# Patient Record
Sex: MALE | Race: WHITE | Employment: OTHER | URBAN - METROPOLITAN AREA
[De-identification: names, ages, dates, MRNs, and addresses within clinical notes are randomized per-mention and may not be internally consistent; named-entity substitution may affect disease eponyms.]

---

## 2017-01-24 ENCOUNTER — OFFICE VISIT (OUTPATIENT)
Dept: CARDIOLOGY CLINIC | Age: 72
End: 2017-01-24

## 2017-01-24 VITALS
HEART RATE: 99 BPM | BODY MASS INDEX: 26.66 KG/M2 | WEIGHT: 160 LBS | SYSTOLIC BLOOD PRESSURE: 134 MMHG | HEIGHT: 65 IN | OXYGEN SATURATION: 97 % | DIASTOLIC BLOOD PRESSURE: 70 MMHG

## 2017-01-24 DIAGNOSIS — I25.118 CORONARY ARTERY DISEASE OF NATIVE ARTERY OF NATIVE HEART WITH STABLE ANGINA PECTORIS (HCC): Primary | ICD-10-CM

## 2017-01-24 DIAGNOSIS — I10 ESSENTIAL HYPERTENSION: ICD-10-CM

## 2017-01-24 PROCEDURE — 4040F PNEUMOC VAC/ADMIN/RCVD: CPT | Performed by: INTERNAL MEDICINE

## 2017-01-24 PROCEDURE — 1036F TOBACCO NON-USER: CPT | Performed by: INTERNAL MEDICINE

## 2017-01-24 PROCEDURE — G8420 CALC BMI NORM PARAMETERS: HCPCS | Performed by: INTERNAL MEDICINE

## 2017-01-24 PROCEDURE — G8484 FLU IMMUNIZE NO ADMIN: HCPCS | Performed by: INTERNAL MEDICINE

## 2017-01-24 PROCEDURE — 1123F ACP DISCUSS/DSCN MKR DOCD: CPT | Performed by: INTERNAL MEDICINE

## 2017-01-24 PROCEDURE — G8598 ASA/ANTIPLAT THER USED: HCPCS | Performed by: INTERNAL MEDICINE

## 2017-01-24 PROCEDURE — G8427 DOCREV CUR MEDS BY ELIG CLIN: HCPCS | Performed by: INTERNAL MEDICINE

## 2017-01-24 PROCEDURE — 99214 OFFICE O/P EST MOD 30 MIN: CPT | Performed by: INTERNAL MEDICINE

## 2017-01-24 PROCEDURE — 3017F COLORECTAL CA SCREEN DOC REV: CPT | Performed by: INTERNAL MEDICINE

## 2017-01-24 RX ORDER — ATORVASTATIN CALCIUM 40 MG/1
40 TABLET, FILM COATED ORAL NIGHTLY
Qty: 30 TABLET | Refills: 11 | Status: SHIPPED | OUTPATIENT
Start: 2017-01-24 | End: 2017-07-21 | Stop reason: SDUPTHER

## 2017-01-24 RX ORDER — LISINOPRIL 2.5 MG/1
2.5 TABLET ORAL DAILY
Qty: 30 TABLET | Refills: 11 | Status: SHIPPED | OUTPATIENT
Start: 2017-01-24 | End: 2017-07-21 | Stop reason: SDUPTHER

## 2017-01-24 RX ORDER — DIGOXIN 125 MCG
125 TABLET ORAL DAILY
Qty: 30 TABLET | Refills: 11 | Status: SHIPPED | OUTPATIENT
Start: 2017-01-24 | End: 2017-07-21 | Stop reason: CLARIF

## 2017-02-16 ENCOUNTER — OFFICE VISIT (OUTPATIENT)
Dept: INTERNAL MEDICINE CLINIC | Age: 72
End: 2017-02-16

## 2017-02-16 VITALS
SYSTOLIC BLOOD PRESSURE: 150 MMHG | DIASTOLIC BLOOD PRESSURE: 75 MMHG | BODY MASS INDEX: 26.66 KG/M2 | HEIGHT: 65 IN | HEART RATE: 65 BPM | RESPIRATION RATE: 18 BRPM | WEIGHT: 160 LBS

## 2017-02-16 DIAGNOSIS — R13.14 PHARYNGOESOPHAGEAL DYSPHAGIA: ICD-10-CM

## 2017-02-16 DIAGNOSIS — I50.42 HEART FAILURE, SYSTOLIC AND DIASTOLIC, CHRONIC (HCC): ICD-10-CM

## 2017-02-16 DIAGNOSIS — I25.118 CORONARY ARTERY DISEASE OF NATIVE ARTERY OF NATIVE HEART WITH STABLE ANGINA PECTORIS (HCC): ICD-10-CM

## 2017-02-16 DIAGNOSIS — Z95.1 S/P CABG X 3: ICD-10-CM

## 2017-02-16 DIAGNOSIS — E11.9 WELL CONTROLLED TYPE 2 DIABETES MELLITUS (HCC): ICD-10-CM

## 2017-02-16 DIAGNOSIS — I10 ESSENTIAL HYPERTENSION: Primary | ICD-10-CM

## 2017-02-16 DIAGNOSIS — I25.5 ISCHEMIC CARDIOMYOPATHY: ICD-10-CM

## 2017-02-16 PROCEDURE — G8420 CALC BMI NORM PARAMETERS: HCPCS | Performed by: INTERNAL MEDICINE

## 2017-02-16 PROCEDURE — 1036F TOBACCO NON-USER: CPT | Performed by: INTERNAL MEDICINE

## 2017-02-16 PROCEDURE — G8427 DOCREV CUR MEDS BY ELIG CLIN: HCPCS | Performed by: INTERNAL MEDICINE

## 2017-02-16 PROCEDURE — 4040F PNEUMOC VAC/ADMIN/RCVD: CPT | Performed by: INTERNAL MEDICINE

## 2017-02-16 PROCEDURE — 3045F PR MOST RECENT HEMOGLOBIN A1C LEVEL 7.0-9.0%: CPT | Performed by: INTERNAL MEDICINE

## 2017-02-16 PROCEDURE — G8598 ASA/ANTIPLAT THER USED: HCPCS | Performed by: INTERNAL MEDICINE

## 2017-02-16 PROCEDURE — 99214 OFFICE O/P EST MOD 30 MIN: CPT | Performed by: INTERNAL MEDICINE

## 2017-02-16 PROCEDURE — 3017F COLORECTAL CA SCREEN DOC REV: CPT | Performed by: INTERNAL MEDICINE

## 2017-02-16 PROCEDURE — G8484 FLU IMMUNIZE NO ADMIN: HCPCS | Performed by: INTERNAL MEDICINE

## 2017-02-16 PROCEDURE — 1123F ACP DISCUSS/DSCN MKR DOCD: CPT | Performed by: INTERNAL MEDICINE

## 2017-02-16 ASSESSMENT — ENCOUNTER SYMPTOMS
RHINORRHEA: 0
CHEST TIGHTNESS: 0
SHORTNESS OF BREATH: 0
COLOR CHANGE: 0

## 2017-02-21 ENCOUNTER — TELEPHONE (OUTPATIENT)
Dept: INTERNAL MEDICINE CLINIC | Age: 72
End: 2017-02-21

## 2017-02-21 ENCOUNTER — HOSPITAL ENCOUNTER (OUTPATIENT)
Dept: GENERAL RADIOLOGY | Age: 72
Discharge: HOME | End: 2017-02-21
Attending: INTERNAL MEDICINE | Admitting: INTERNAL MEDICINE

## 2017-02-21 DIAGNOSIS — R13.14 DYSPHAGIA, PHARYNGOESOPHAGEAL PHASE: ICD-10-CM

## 2017-02-21 DIAGNOSIS — R13.14 PHARYNGOESOPHAGEAL DYSPHAGIA: ICD-10-CM

## 2017-02-21 LAB
A/G RATIO: 1.1 (ref 1.1–2.2)
ALBUMIN SERPL-MCNC: 4.1 G/DL (ref 3.4–5)
ALP BLD-CCNC: 87 U/L (ref 40–129)
ALT SERPL-CCNC: 11 U/L (ref 10–40)
ANION GAP SERPL CALCULATED.3IONS-SCNC: 18 MMOL/L (ref 3–16)
AST SERPL-CCNC: 14 U/L (ref 15–37)
BASOPHILS ABSOLUTE: 0.1 K/UL (ref 0–0.2)
BASOPHILS RELATIVE PERCENT: 0.7 %
BILIRUB SERPL-MCNC: 0.6 MG/DL (ref 0–1)
BUN BLDV-MCNC: 11 MG/DL (ref 7–20)
CALCIUM SERPL-MCNC: 9.5 MG/DL (ref 8.3–10.6)
CHLORIDE BLD-SCNC: 96 MMOL/L (ref 99–110)
CHOLESTEROL, TOTAL: 158 MG/DL (ref 0–199)
CO2: 21 MMOL/L (ref 21–32)
CREAT SERPL-MCNC: 1 MG/DL (ref 0.8–1.3)
EOSINOPHILS ABSOLUTE: 0.2 K/UL (ref 0–0.6)
EOSINOPHILS RELATIVE PERCENT: 2.8 %
GFR AFRICAN AMERICAN: >60
GFR NON-AFRICAN AMERICAN: >60
GLOBULIN: 3.7 G/DL
GLUCOSE BLD-MCNC: 316 MG/DL (ref 70–99)
HCT VFR BLD CALC: 42.6 % (ref 40.5–52.5)
HDLC SERPL-MCNC: 31 MG/DL (ref 40–60)
HEMOGLOBIN: 13.9 G/DL (ref 13.5–17.5)
LDL CHOLESTEROL CALCULATED: 103 MG/DL
LYMPHOCYTES ABSOLUTE: 1.5 K/UL (ref 1–5.1)
LYMPHOCYTES RELATIVE PERCENT: 18.5 %
MCH RBC QN AUTO: 27.6 PG (ref 26–34)
MCHC RBC AUTO-ENTMCNC: 32.7 G/DL (ref 31–36)
MCV RBC AUTO: 84.5 FL (ref 80–100)
MONOCYTES ABSOLUTE: 0.6 K/UL (ref 0–1.3)
MONOCYTES RELATIVE PERCENT: 7.9 %
NEUTROPHILS ABSOLUTE: 5.7 K/UL (ref 1.7–7.7)
NEUTROPHILS RELATIVE PERCENT: 70.1 %
PDW BLD-RTO: 15.3 % (ref 12.4–15.4)
PLATELET # BLD: 264 K/UL (ref 135–450)
PMV BLD AUTO: 9.1 FL (ref 5–10.5)
POTASSIUM SERPL-SCNC: 4.4 MMOL/L (ref 3.5–5.1)
RBC # BLD: 5.04 M/UL (ref 4.2–5.9)
SODIUM BLD-SCNC: 135 MMOL/L (ref 136–145)
TOTAL PROTEIN: 7.8 G/DL (ref 6.4–8.2)
TRIGL SERPL-MCNC: 120 MG/DL (ref 0–150)
VLDLC SERPL CALC-MCNC: 24 MG/DL
WBC # BLD: 8.2 K/UL (ref 4–11)

## 2017-02-22 LAB
ESTIMATED AVERAGE GLUCOSE: 263.3 MG/DL
HBA1C MFR BLD: 10.8 %

## 2017-02-24 ENCOUNTER — HOSPITAL ENCOUNTER (OUTPATIENT)
Dept: CARDIOLOGY | Facility: CLINIC | Age: 72
Discharge: HOME | End: 2017-02-24
Attending: INTERNAL MEDICINE | Admitting: INTERNAL MEDICINE

## 2017-03-22 ENCOUNTER — HOSPITAL ENCOUNTER (OUTPATIENT)
Dept: CARDIOLOGY | Facility: CLINIC | Age: 72
Discharge: HOME | End: 2017-03-22
Attending: INTERNAL MEDICINE | Admitting: INTERNAL MEDICINE

## 2017-05-12 RX ORDER — SODIUM CHLORIDE, SODIUM LACTATE, POTASSIUM CHLORIDE, CALCIUM CHLORIDE 600; 310; 30; 20 MG/100ML; MG/100ML; MG/100ML; MG/100ML
INJECTION, SOLUTION INTRAVENOUS CONTINUOUS
Status: CANCELLED | OUTPATIENT
Start: 2017-05-12

## 2017-05-15 ENCOUNTER — HOSPITAL ENCOUNTER (OUTPATIENT)
Dept: OTHER | Age: 72
Discharge: HOME | End: 2017-05-15
Attending: INTERNAL MEDICINE | Admitting: INTERNAL MEDICINE

## 2017-05-16 ENCOUNTER — OFFICE VISIT (OUTPATIENT)
Dept: INTERNAL MEDICINE CLINIC | Age: 72
End: 2017-05-16

## 2017-05-16 VITALS
WEIGHT: 156 LBS | HEIGHT: 65 IN | BODY MASS INDEX: 25.99 KG/M2 | DIASTOLIC BLOOD PRESSURE: 75 MMHG | HEART RATE: 70 BPM | RESPIRATION RATE: 18 BRPM | SYSTOLIC BLOOD PRESSURE: 140 MMHG

## 2017-05-16 DIAGNOSIS — I50.42 HEART FAILURE, SYSTOLIC AND DIASTOLIC, CHRONIC (HCC): ICD-10-CM

## 2017-05-16 DIAGNOSIS — I10 ESSENTIAL HYPERTENSION: Primary | ICD-10-CM

## 2017-05-16 DIAGNOSIS — K22.2 ESOPHAGEAL STRICTURE: ICD-10-CM

## 2017-05-16 DIAGNOSIS — I25.118 CORONARY ARTERY DISEASE OF NATIVE ARTERY OF NATIVE HEART WITH STABLE ANGINA PECTORIS (HCC): ICD-10-CM

## 2017-05-16 DIAGNOSIS — Z95.1 S/P CABG X 3: ICD-10-CM

## 2017-05-16 PROCEDURE — 99214 OFFICE O/P EST MOD 30 MIN: CPT | Performed by: INTERNAL MEDICINE

## 2017-05-16 ASSESSMENT — ENCOUNTER SYMPTOMS
TROUBLE SWALLOWING: 1
RHINORRHEA: 0
SHORTNESS OF BREATH: 0
CHEST TIGHTNESS: 0
COLOR CHANGE: 0

## 2017-05-22 ENCOUNTER — HOSPITAL ENCOUNTER (OUTPATIENT)
Dept: OTHER | Age: 72
Discharge: HOME | End: 2017-05-22
Attending: INTERNAL MEDICINE | Admitting: INTERNAL MEDICINE

## 2017-05-22 VITALS
SYSTOLIC BLOOD PRESSURE: 143 MMHG | OXYGEN SATURATION: 96 % | TEMPERATURE: 98.3 F | HEART RATE: 91 BPM | WEIGHT: 156 LBS | DIASTOLIC BLOOD PRESSURE: 83 MMHG | BODY MASS INDEX: 25.99 KG/M2 | HEIGHT: 65 IN | RESPIRATION RATE: 14 BRPM

## 2017-05-22 LAB
GLUCOSE BLD-MCNC: 212 MG/DL (ref 70–99)
GLUCOSE BLD-MCNC: 215 MG/DL (ref 70–99)
PERFORMED ON: ABNORMAL
PERFORMED ON: ABNORMAL

## 2017-05-22 RX ORDER — SODIUM CHLORIDE, SODIUM LACTATE, POTASSIUM CHLORIDE, CALCIUM CHLORIDE 600; 310; 30; 20 MG/100ML; MG/100ML; MG/100ML; MG/100ML
INJECTION, SOLUTION INTRAVENOUS CONTINUOUS
Status: DISCONTINUED | OUTPATIENT
Start: 2017-05-22 | End: 2017-05-23 | Stop reason: HOSPADM

## 2017-05-22 RX ADMIN — SODIUM CHLORIDE, SODIUM LACTATE, POTASSIUM CHLORIDE, CALCIUM CHLORIDE: 600; 310; 30; 20 INJECTION, SOLUTION INTRAVENOUS at 09:13

## 2017-05-22 ASSESSMENT — PAIN - FUNCTIONAL ASSESSMENT: PAIN_FUNCTIONAL_ASSESSMENT: 0-10

## 2017-05-22 ASSESSMENT — PAIN DESCRIPTION - DESCRIPTORS: DESCRIPTORS: ACHING

## 2017-05-22 ASSESSMENT — PAIN SCALES - GENERAL: PAINLEVEL_OUTOF10: 0

## 2017-06-05 ENCOUNTER — TELEPHONE (OUTPATIENT)
Dept: CARDIOLOGY CLINIC | Age: 72
End: 2017-06-05

## 2017-06-19 ENCOUNTER — HOSPITAL ENCOUNTER (OUTPATIENT)
Dept: OTHER | Age: 72
Discharge: HOME | End: 2017-06-19
Attending: INTERNAL MEDICINE | Admitting: INTERNAL MEDICINE

## 2017-06-19 VITALS
BODY MASS INDEX: 25.99 KG/M2 | HEART RATE: 93 BPM | DIASTOLIC BLOOD PRESSURE: 75 MMHG | HEIGHT: 65 IN | OXYGEN SATURATION: 97 % | RESPIRATION RATE: 16 BRPM | SYSTOLIC BLOOD PRESSURE: 132 MMHG | WEIGHT: 156 LBS | TEMPERATURE: 97.6 F

## 2017-06-19 LAB
GLUCOSE BLD-MCNC: 199 MG/DL (ref 70–99)
GLUCOSE BLD-MCNC: 201 MG/DL (ref 70–99)
PERFORMED ON: ABNORMAL
PERFORMED ON: ABNORMAL

## 2017-06-19 RX ORDER — SODIUM CHLORIDE, SODIUM LACTATE, POTASSIUM CHLORIDE, CALCIUM CHLORIDE 600; 310; 30; 20 MG/100ML; MG/100ML; MG/100ML; MG/100ML
INJECTION, SOLUTION INTRAVENOUS CONTINUOUS
Status: DISCONTINUED | OUTPATIENT
Start: 2017-06-19 | End: 2017-06-20 | Stop reason: HOSPADM

## 2017-06-19 ASSESSMENT — PAIN - FUNCTIONAL ASSESSMENT: PAIN_FUNCTIONAL_ASSESSMENT: 0-10

## 2017-06-24 PROBLEM — I16.0 HYPERTENSIVE URGENCY: Status: ACTIVE | Noted: 2017-06-24

## 2017-06-24 PROBLEM — G45.9 TIA (TRANSIENT ISCHEMIC ATTACK): Status: ACTIVE | Noted: 2017-06-24

## 2017-06-24 PROBLEM — K22.2 ESOPHAGEAL STRICTURE: Chronic | Status: ACTIVE | Noted: 2017-06-24

## 2017-06-24 PROBLEM — R73.9 ACUTE HYPERGLYCEMIA: Status: ACTIVE | Noted: 2017-06-24

## 2017-06-29 ENCOUNTER — TELEPHONE (OUTPATIENT)
Dept: INTERNAL MEDICINE CLINIC | Age: 72
End: 2017-06-29

## 2017-06-29 ENCOUNTER — TELEPHONE (OUTPATIENT)
Dept: PHARMACY | Facility: CLINIC | Age: 72
End: 2017-06-29

## 2017-06-29 ENCOUNTER — CARE COORDINATION (OUTPATIENT)
Dept: CASE MANAGEMENT | Age: 72
End: 2017-06-29

## 2017-06-30 ENCOUNTER — TELEPHONE (OUTPATIENT)
Dept: NEUROLOGY | Age: 72
End: 2017-06-30

## 2017-07-03 ENCOUNTER — CARE COORDINATION (OUTPATIENT)
Dept: CASE MANAGEMENT | Age: 72
End: 2017-07-03

## 2017-07-10 ENCOUNTER — CARE COORDINATION (OUTPATIENT)
Dept: CASE MANAGEMENT | Age: 72
End: 2017-07-10

## 2017-07-11 ENCOUNTER — OFFICE VISIT (OUTPATIENT)
Dept: INTERNAL MEDICINE CLINIC | Age: 72
End: 2017-07-11

## 2017-07-11 VITALS — HEIGHT: 66 IN | WEIGHT: 150 LBS | BODY MASS INDEX: 24.11 KG/M2

## 2017-07-11 DIAGNOSIS — K22.2 ESOPHAGEAL STRICTURE: Chronic | ICD-10-CM

## 2017-07-11 DIAGNOSIS — E78.2 MIXED HYPERLIPIDEMIA: Chronic | ICD-10-CM

## 2017-07-11 DIAGNOSIS — I63.231 ARTERIAL ISCHEMIC STROKE, ICA, RIGHT, ACUTE (HCC): ICD-10-CM

## 2017-07-11 DIAGNOSIS — I50.42 CHRONIC COMBINED SYSTOLIC AND DIASTOLIC CONGESTIVE HEART FAILURE (HCC): Chronic | ICD-10-CM

## 2017-07-11 DIAGNOSIS — Z09 HOSPITAL DISCHARGE FOLLOW-UP: Primary | ICD-10-CM

## 2017-07-11 PROCEDURE — 99495 TRANSJ CARE MGMT MOD F2F 14D: CPT | Performed by: INTERNAL MEDICINE

## 2017-07-11 RX ORDER — PANTOPRAZOLE SODIUM 40 MG/1
40 TABLET, DELAYED RELEASE ORAL DAILY
Refills: 0 | COMMUNITY
Start: 2017-06-23 | End: 2017-07-21 | Stop reason: ALTCHOICE

## 2017-07-14 PROCEDURE — 93272 ECG/REVIEW INTERPRET ONLY: CPT | Performed by: INTERNAL MEDICINE

## 2017-07-18 ENCOUNTER — TELEPHONE (OUTPATIENT)
Dept: CARDIOLOGY CLINIC | Age: 72
End: 2017-07-18

## 2017-07-18 DIAGNOSIS — I63.231 ARTERIAL ISCHEMIC STROKE, ICA, RIGHT, ACUTE (HCC): ICD-10-CM

## 2017-07-21 ENCOUNTER — OFFICE VISIT (OUTPATIENT)
Dept: CARDIOLOGY CLINIC | Age: 72
End: 2017-07-21

## 2017-07-21 VITALS
HEIGHT: 66 IN | OXYGEN SATURATION: 96 % | HEART RATE: 84 BPM | WEIGHT: 148 LBS | SYSTOLIC BLOOD PRESSURE: 120 MMHG | BODY MASS INDEX: 23.78 KG/M2 | DIASTOLIC BLOOD PRESSURE: 70 MMHG

## 2017-07-21 DIAGNOSIS — I25.118 CORONARY ARTERY DISEASE OF NATIVE ARTERY OF NATIVE HEART WITH STABLE ANGINA PECTORIS (HCC): ICD-10-CM

## 2017-07-21 DIAGNOSIS — I25.10 CAD IN NATIVE ARTERY: Primary | ICD-10-CM

## 2017-07-21 DIAGNOSIS — I10 ESSENTIAL HYPERTENSION: Chronic | ICD-10-CM

## 2017-07-21 DIAGNOSIS — I16.0 HYPERTENSIVE URGENCY: ICD-10-CM

## 2017-07-21 PROCEDURE — 3017F COLORECTAL CA SCREEN DOC REV: CPT | Performed by: INTERNAL MEDICINE

## 2017-07-21 PROCEDURE — G8427 DOCREV CUR MEDS BY ELIG CLIN: HCPCS | Performed by: INTERNAL MEDICINE

## 2017-07-21 PROCEDURE — 99214 OFFICE O/P EST MOD 30 MIN: CPT | Performed by: INTERNAL MEDICINE

## 2017-07-21 PROCEDURE — 1111F DSCHRG MED/CURRENT MED MERGE: CPT | Performed by: INTERNAL MEDICINE

## 2017-07-21 PROCEDURE — 1123F ACP DISCUSS/DSCN MKR DOCD: CPT | Performed by: INTERNAL MEDICINE

## 2017-07-21 PROCEDURE — 4040F PNEUMOC VAC/ADMIN/RCVD: CPT | Performed by: INTERNAL MEDICINE

## 2017-07-21 PROCEDURE — G8598 ASA/ANTIPLAT THER USED: HCPCS | Performed by: INTERNAL MEDICINE

## 2017-07-21 PROCEDURE — G8420 CALC BMI NORM PARAMETERS: HCPCS | Performed by: INTERNAL MEDICINE

## 2017-07-21 PROCEDURE — 1036F TOBACCO NON-USER: CPT | Performed by: INTERNAL MEDICINE

## 2017-07-21 RX ORDER — CLOPIDOGREL BISULFATE 75 MG/1
75 TABLET ORAL DAILY
Qty: 90 TABLET | Refills: 0 | Status: SHIPPED | OUTPATIENT
Start: 2017-07-21 | End: 2019-04-05 | Stop reason: SDUPTHER

## 2017-07-21 RX ORDER — DIGOXIN 125 MCG
125 TABLET ORAL DAILY
Qty: 90 TABLET | Refills: 3 | Status: CANCELLED | OUTPATIENT
Start: 2017-07-21

## 2017-07-21 RX ORDER — DIGOXIN 0.05 MG/ML
125 SOLUTION ORAL DAILY
Qty: 2 BOTTLE | Refills: 5 | Status: SHIPPED | OUTPATIENT
Start: 2017-07-21 | End: 2017-12-18 | Stop reason: ALTCHOICE

## 2017-07-21 RX ORDER — LISINOPRIL 2.5 MG/1
2.5 TABLET ORAL DAILY
Qty: 90 TABLET | Refills: 3 | Status: SHIPPED | OUTPATIENT
Start: 2017-07-21 | End: 2018-02-19 | Stop reason: SDUPTHER

## 2017-07-21 RX ORDER — ATORVASTATIN CALCIUM 40 MG/1
40 TABLET, FILM COATED ORAL NIGHTLY
Qty: 90 TABLET | Refills: 3 | Status: SHIPPED | OUTPATIENT
Start: 2017-07-21 | End: 2018-02-19 | Stop reason: SDUPTHER

## 2017-07-26 ENCOUNTER — OFFICE VISIT (OUTPATIENT)
Dept: NEUROLOGY | Age: 72
End: 2017-07-26

## 2017-07-26 VITALS
HEART RATE: 91 BPM | OXYGEN SATURATION: 95 % | BODY MASS INDEX: 23.95 KG/M2 | DIASTOLIC BLOOD PRESSURE: 66 MMHG | WEIGHT: 149 LBS | SYSTOLIC BLOOD PRESSURE: 124 MMHG | HEIGHT: 66 IN

## 2017-07-26 DIAGNOSIS — I63.231 ARTERIAL ISCHEMIC STROKE, ICA, RIGHT, ACUTE (HCC): Primary | ICD-10-CM

## 2017-07-26 DIAGNOSIS — E78.5 DYSLIPIDEMIA: ICD-10-CM

## 2017-07-26 DIAGNOSIS — I42.9 CARDIOMYOPATHY (HCC): ICD-10-CM

## 2017-07-26 DIAGNOSIS — G47.33 OSA (OBSTRUCTIVE SLEEP APNEA): ICD-10-CM

## 2017-07-26 DIAGNOSIS — I10 HTN (HYPERTENSION), BENIGN: ICD-10-CM

## 2017-07-26 DIAGNOSIS — I25.10 CAD IN NATIVE ARTERY: ICD-10-CM

## 2017-07-26 PROCEDURE — G8427 DOCREV CUR MEDS BY ELIG CLIN: HCPCS | Performed by: PSYCHIATRY & NEUROLOGY

## 2017-07-26 PROCEDURE — 1111F DSCHRG MED/CURRENT MED MERGE: CPT | Performed by: PSYCHIATRY & NEUROLOGY

## 2017-07-26 PROCEDURE — 4040F PNEUMOC VAC/ADMIN/RCVD: CPT | Performed by: PSYCHIATRY & NEUROLOGY

## 2017-07-26 PROCEDURE — 1123F ACP DISCUSS/DSCN MKR DOCD: CPT | Performed by: PSYCHIATRY & NEUROLOGY

## 2017-07-26 PROCEDURE — G8420 CALC BMI NORM PARAMETERS: HCPCS | Performed by: PSYCHIATRY & NEUROLOGY

## 2017-07-26 PROCEDURE — G8598 ASA/ANTIPLAT THER USED: HCPCS | Performed by: PSYCHIATRY & NEUROLOGY

## 2017-07-26 PROCEDURE — 99214 OFFICE O/P EST MOD 30 MIN: CPT | Performed by: PSYCHIATRY & NEUROLOGY

## 2017-07-26 PROCEDURE — 3017F COLORECTAL CA SCREEN DOC REV: CPT | Performed by: PSYCHIATRY & NEUROLOGY

## 2017-07-26 PROCEDURE — 1036F TOBACCO NON-USER: CPT | Performed by: PSYCHIATRY & NEUROLOGY

## 2017-07-26 PROCEDURE — 3046F HEMOGLOBIN A1C LEVEL >9.0%: CPT | Performed by: PSYCHIATRY & NEUROLOGY

## 2017-08-30 ENCOUNTER — HOSPITAL ENCOUNTER (OUTPATIENT)
Dept: SLEEP MEDICINE | Age: 72
Discharge: HOME | End: 2017-09-01
Attending: PSYCHIATRY & NEUROLOGY | Admitting: PSYCHIATRY & NEUROLOGY

## 2017-08-31 PROCEDURE — 95810 POLYSOM 6/> YRS 4/> PARAM: CPT | Performed by: PSYCHIATRY & NEUROLOGY

## 2017-09-11 DIAGNOSIS — G47.33 OSA (OBSTRUCTIVE SLEEP APNEA): Primary | ICD-10-CM

## 2017-09-12 ENCOUNTER — TELEPHONE (OUTPATIENT)
Dept: NEUROLOGY | Age: 72
End: 2017-09-12

## 2017-09-12 DIAGNOSIS — G47.33 OSA (OBSTRUCTIVE SLEEP APNEA): Primary | ICD-10-CM

## 2017-09-13 ENCOUNTER — OFFICE VISIT (OUTPATIENT)
Dept: CARDIOLOGY CLINIC | Age: 72
End: 2017-09-13

## 2017-09-13 VITALS
SYSTOLIC BLOOD PRESSURE: 130 MMHG | BODY MASS INDEX: 23.95 KG/M2 | OXYGEN SATURATION: 97 % | WEIGHT: 149 LBS | HEIGHT: 66 IN | HEART RATE: 98 BPM | DIASTOLIC BLOOD PRESSURE: 70 MMHG

## 2017-09-13 DIAGNOSIS — I50.42 CHRONIC COMBINED SYSTOLIC AND DIASTOLIC CONGESTIVE HEART FAILURE (HCC): Chronic | ICD-10-CM

## 2017-09-13 DIAGNOSIS — Z76.89 ENCOUNTER TO ESTABLISH CARE: Primary | ICD-10-CM

## 2017-09-13 PROCEDURE — 99205 OFFICE O/P NEW HI 60 MIN: CPT | Performed by: INTERNAL MEDICINE

## 2017-09-13 PROCEDURE — 1036F TOBACCO NON-USER: CPT | Performed by: INTERNAL MEDICINE

## 2017-09-13 PROCEDURE — 1123F ACP DISCUSS/DSCN MKR DOCD: CPT | Performed by: INTERNAL MEDICINE

## 2017-09-13 PROCEDURE — 3017F COLORECTAL CA SCREEN DOC REV: CPT | Performed by: INTERNAL MEDICINE

## 2017-09-13 PROCEDURE — 93000 ELECTROCARDIOGRAM COMPLETE: CPT | Performed by: INTERNAL MEDICINE

## 2017-09-13 PROCEDURE — 4040F PNEUMOC VAC/ADMIN/RCVD: CPT | Performed by: INTERNAL MEDICINE

## 2017-09-13 PROCEDURE — G8427 DOCREV CUR MEDS BY ELIG CLIN: HCPCS | Performed by: INTERNAL MEDICINE

## 2017-09-13 PROCEDURE — G8420 CALC BMI NORM PARAMETERS: HCPCS | Performed by: INTERNAL MEDICINE

## 2017-09-13 PROCEDURE — G8598 ASA/ANTIPLAT THER USED: HCPCS | Performed by: INTERNAL MEDICINE

## 2017-09-28 ENCOUNTER — HOSPITAL ENCOUNTER (OUTPATIENT)
Dept: CARDIOLOGY | Facility: CLINIC | Age: 72
Discharge: HOME | End: 2017-09-28
Attending: INTERNAL MEDICINE | Admitting: INTERNAL MEDICINE

## 2017-09-28 LAB
LV EF: 20 %
LVEF MODALITY: NORMAL

## 2017-10-02 ENCOUNTER — CARE COORDINATION (OUTPATIENT)
Dept: CARE COORDINATION | Age: 72
End: 2017-10-02

## 2017-10-11 ENCOUNTER — OFFICE VISIT (OUTPATIENT)
Dept: INTERNAL MEDICINE CLINIC | Age: 72
End: 2017-10-11

## 2017-10-11 VITALS
SYSTOLIC BLOOD PRESSURE: 135 MMHG | DIASTOLIC BLOOD PRESSURE: 75 MMHG | HEART RATE: 70 BPM | RESPIRATION RATE: 18 BRPM | WEIGHT: 150 LBS | HEIGHT: 65 IN | BODY MASS INDEX: 24.99 KG/M2

## 2017-10-11 DIAGNOSIS — I25.10 CAD IN NATIVE ARTERY: ICD-10-CM

## 2017-10-11 DIAGNOSIS — I50.42 CHRONIC COMBINED SYSTOLIC AND DIASTOLIC CONGESTIVE HEART FAILURE (HCC): Chronic | ICD-10-CM

## 2017-10-11 DIAGNOSIS — E78.2 MIXED HYPERLIPIDEMIA: Chronic | ICD-10-CM

## 2017-10-11 DIAGNOSIS — I10 ESSENTIAL HYPERTENSION: Chronic | ICD-10-CM

## 2017-10-11 PROCEDURE — 1123F ACP DISCUSS/DSCN MKR DOCD: CPT | Performed by: INTERNAL MEDICINE

## 2017-10-11 PROCEDURE — 3046F HEMOGLOBIN A1C LEVEL >9.0%: CPT | Performed by: INTERNAL MEDICINE

## 2017-10-11 PROCEDURE — G8420 CALC BMI NORM PARAMETERS: HCPCS | Performed by: INTERNAL MEDICINE

## 2017-10-11 PROCEDURE — 1036F TOBACCO NON-USER: CPT | Performed by: INTERNAL MEDICINE

## 2017-10-11 PROCEDURE — G8598 ASA/ANTIPLAT THER USED: HCPCS | Performed by: INTERNAL MEDICINE

## 2017-10-11 PROCEDURE — 99214 OFFICE O/P EST MOD 30 MIN: CPT | Performed by: INTERNAL MEDICINE

## 2017-10-11 PROCEDURE — 4040F PNEUMOC VAC/ADMIN/RCVD: CPT | Performed by: INTERNAL MEDICINE

## 2017-10-11 PROCEDURE — G8427 DOCREV CUR MEDS BY ELIG CLIN: HCPCS | Performed by: INTERNAL MEDICINE

## 2017-10-11 PROCEDURE — G8484 FLU IMMUNIZE NO ADMIN: HCPCS | Performed by: INTERNAL MEDICINE

## 2017-10-11 PROCEDURE — 3017F COLORECTAL CA SCREEN DOC REV: CPT | Performed by: INTERNAL MEDICINE

## 2017-10-11 RX ORDER — GLIPIZIDE 5 MG/1
2.5 TABLET ORAL
Qty: 180 TABLET | Refills: 0 | Status: SHIPPED | OUTPATIENT
Start: 2017-10-11 | End: 2019-04-05 | Stop reason: SDUPTHER

## 2017-10-11 ASSESSMENT — PATIENT HEALTH QUESTIONNAIRE - PHQ9
1. LITTLE INTEREST OR PLEASURE IN DOING THINGS: 0
2. FEELING DOWN, DEPRESSED OR HOPELESS: 0
SUM OF ALL RESPONSES TO PHQ9 QUESTIONS 1 & 2: 0
SUM OF ALL RESPONSES TO PHQ QUESTIONS 1-9: 0

## 2017-10-11 ASSESSMENT — ENCOUNTER SYMPTOMS
SHORTNESS OF BREATH: 0
COLOR CHANGE: 0
CHEST TIGHTNESS: 0
TROUBLE SWALLOWING: 1
RHINORRHEA: 0

## 2017-10-11 NOTE — PROGRESS NOTES
Subjective:      Patient ID: Fannie Oneil is a 66 y.o. male. HPI     66 y.o. male   Here for  Pre op clearance for cystoscopy given ongoing hematuria     Pt with significant medical issues all dx last year when admitted for dysuria    Known CAD with low EF, urgent CABG, DM, HTN, hyperlipidemia   Pt has been very non compliant for few months as he claimed he was asymptomatic. Recent admission to [facility]- 6/17 for right MCA stroke ( mulitple ) with left UE , left facial weakness and dysphagia with slurred speech    Workup with MRI confirmed right MCA multiple strokes. No Afibb noted in TELE. ECHO with no thrombus  Added plavix to asa , changed to dysphagia diet     Pt was very non compliant with meds before and now reports taking them regularly       Daughter today reports that metformin is very difficult to swallow due to size . Denies any chest pain, occasional sob with activity   No pedal edema        Current Outpatient Prescriptions   Medication Sig Dispense Refill    glipiZIDE (GLUCOTROL) 5 MG tablet Take 0.5 tablets by mouth 2 times daily (before meals) 180 tablet 0    clopidogrel (PLAVIX) 75 MG tablet Take 1 tablet by mouth daily 90 tablet 0    lisinopril (PRINIVIL;ZESTRIL) 2.5 MG tablet Take 1 tablet by mouth daily 90 tablet 3    atorvastatin (LIPITOR) 40 MG tablet Take 1 tablet by mouth nightly 90 tablet 3    metoprolol tartrate (LOPRESSOR) 25 MG tablet Take 0.5 tablets by mouth 2 times daily 90 tablet 3    digoxin (LANOXIN) 0.05 MG/ML solution Take 2.5 mLs by mouth daily 2 Bottle 5    aspirin 81 MG tablet Take 81 mg by mouth daily        No current facility-administered medications for this visit. Review of Systems   Constitutional: Negative for activity change, fatigue and unexpected weight change. HENT: Positive for trouble swallowing. Negative for ear discharge, hearing loss, postnasal drip and rhinorrhea.     Respiratory: Negative for chest tightness and shortness of breath. Cardiovascular: Negative for chest pain and palpitations. Endocrine: Positive for polyuria. Genitourinary: Negative for frequency and hematuria. Musculoskeletal: Negative for neck stiffness. Skin: Negative for color change. Allergic/Immunologic: Negative for immunocompromised state. Neurological: Negative for weakness. Hematological: Negative for adenopathy. Objective:   Physical Exam     Vitals:    10/11/17 0953   BP: 135/75   Pulse: 70   Resp: 18           General:  eldelry male,  Awake, alert and oriented. Appears to be not in any distress  Mucous Membranes:  Pink , anicteric  Neck: No JVD, no carotid bruit, no thyromegaly  Chest:  Clear to auscultation bilaterally, no added sounds  Cardiovascular:  RRR S1S2 heard, no murmurs or gallops  Abdomen:  Soft, undistended, non tender, no organomegaly, BS present  Extremities: No edema or cyanosis. Distal pulses well felt  Neurological : grossly normal    MUGA      Abnormal resting left ventricular function with inferior, inferolateral, and    septal wall hypokinesis and EF= 20% .            MRI   Multiple small acute infarcts in right MCA territory.       Slow flow versus occlusion suggested in right middle cerebral artery M3   division.       Remote cerebral and cerebellar infarcts.          Carotid doppler          1. There is moderate plaque seen in the right internal carotid artery with    an estimated diameter reduction of <50%.    2. There is minimal plaque seen in the left internal carotid artery with an    estimated diameter reduction of <50%.    3. The vertebral arteries are patent with antegrade flow bilaterally.          Swallow eval  Aspiration observed with thin barium and nectar.       Marked anterior endplate spurring at C5-7 likely impairs swallowing.               Assessment:      1. Uncontrolled type 2 diabetes mellitus with complication, without long-term current use of insulin ([address]     2.  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 CAD in native artery     4. Mixed hyperlipidemia     5. Essential hypertension                 Plan:         DM- 2 - stopped  metformin. Sugars not optimal.   Add glipizide 2.5 mg bid   Advice low carb diet. stop soft drinks Continue same meds  monitor once daily   Recent labs at [place] reviewed - A1 c at 8        CAD - multivessel - s/p urgent CABG in 9/16      Continue metoprolol bid - change to 25 mg in am and 12.5 at night  conitnue   ACEI and digoxin        Cardiomyopathy - low EF of 25 %- well compensated. No reason for lasix. Recent MUGA with 20 % .  High risk for sudden cardiac death  Seen Dr. Debbie Rivas and is planned for AICD soon    Hyperlipidemia - suppose to be on statins    Prostate issues- now planned for cysto - would post pone until cardiac issues resolved  Recommend to wait atleast 3 months for clearance

## 2017-10-16 ENCOUNTER — CARE COORDINATION (OUTPATIENT)
Dept: CARE COORDINATION | Age: 72
End: 2017-10-16

## 2017-10-16 NOTE — LETTER
10/16/2017    [facility]  [address] [facility] Arleen Bliss have been selected by your primary care provider to participate in a Care Coordination Program that provides additional support and resources to provide a higher level of care to our patients. One of those resources is a Nurse Care Coordinator, Yelitza Enriquez who can offer support in managing the health of our patients who have chronic health conditions, multiple health conditions, and or complex health needs. Examples of the types of support Yelitza Enriquez RN will provide include service coordination (finding providers in your network and community, assistance in scheduling services, ensuring test results are available, etc), education, and promoting your ability to follow your doctor's recommended treatment plan. You have been selected to take part in this unique program that will include one on one interaction with Yelitza Enriquez RN. Yelitza Enriquez RN will work with you following some of your appointments with me in our office. She/He will also contact you via phone to help you learn and understand how to manage your health and work towards your chosen health goals. I hope that you will be as excited by this program as we are. Yelitza Enriquez RN will be contacting you in the next week to speak with you regarding your plan of care.     Sincerely,     Juan J Whaley MD

## 2017-10-16 NOTE — CARE COORDINATION
Ambulatory Care Coordination Note  10/16/2017  CM Risk Score: 3  Unique Mortality Risk Score: 18.83    ACC: Juan Carrillo, RN    Summary Note: ACC spoke with patient for follow up . Recent ER visit on 10/11/17 for chest pain. Spoke with his daughter, Angus Rao who assists with his care. He had chest tightness and pressure. Patient does not have any further problems with chest pressure. EF is 23%. Spoke to his daughter about watching for s/s of chf and need for daily weights. Patient is still have blood sugars in 150 to 200 range. He has not been able to start his glipizide yet. He is getting medications from [place] and is waiting on delivery. Plans to start Glipizide 2.5 mg daily. Plans for  defib in the future with Dr. Hazel Jaramillo. Lab Results   Component Value Date    LABA1C 8.2 06/24/2017    LABA1C 10.8 02/21/2017    LABA1C 7.6 11/11/2016     Lab Results   Component Value Date    .6 06/24/2017    .3 02/21/2017    .4 11/11/2016         Past Medical History:   Diagnosis Date    Abnormal echocardiogram 09/2016    Abnormal stress test 09/2016    Cardiomyopathy ([facility] 09/2016    EF 23%    CHF (congestive heart failure) (HCC)     Diabetes (HCC)     Hyperlipidemia     Hypertension      Plan     Ongoing care coordination  CHF teaching and support  Diabetes Education   . Diabetes Assessment    Medic Alert ID:  No  Meal Planning:  Avoidance of concentrated sweets, Plate Method, Carb counting   How often do you test your blood sugar?:  Daily, Meals, Bedtime   Do you have barriers with adherence to non-pharmacologic self-management interventions?  (Nutrition/Exercise/Self-Monitoring):  No   Have you ever had to go to the ED for symptoms of low blood sugar?:  No       Do you have hyperglycemia symptoms?:  Yes    Per:  Day   Do you have hypoglycemia symptoms?:  No   Last Blood Sugar Value:  150   Blood Sugar Monitoring Regimen:  Once a Day, Before Meals, At Bedtime   Blood Sugar Trends:  No Change Ambulatory Care Coordination Assessment    Care Coordination Protocol  Week 1 - Initial Assessment     Do you have all of your prescriptions and are they filled?:  No (Comment: daughter will  today )  Are you able to afford your medications?:  Yes  How often do you have trouble taking your medications the way you have been told to take them?:  Sometimes I take them as prescribed. Ability to seek help/take action for Emergent Urgent situations i.e. fire, crime, inclement weather or health crisis. :  Independent  Ability to ambulate to restroom:  Independent  Ability handle personal hygeine needs (bathing/dressing/grooming): Independent  Ability to manage Medications:  Needs Assistance  Ability to prepare Food Preparation:  Needs Assistance  Ability to maintain home (clean home, laundry):  Needs Assistance  Ability to drive and/or has transportation:  Needs Assistance  Ability to do shopping:  Needs Assistance  Ability to manage finances:  Needs Assistance  Is patient able to live independently?:  Yes     Current Housing:  Private Residence                 Suggested Interventions and Community Resources                  Prior to Admission medications    Medication Sig Start Date End Date Taking?  Authorizing Provider   glipiZIDE (GLUCOTROL) 5 MG tablet Take 0.5 tablets by mouth 2 times daily (before meals) 10/11/17   Virginia Welch MD   clopidogrel (PLAVIX) 75 MG tablet Take 1 tablet by mouth daily 7/21/17   Josee Reyes MD   lisinopril (PRINIVIL;ZESTRIL) 2.5 MG tablet Take 1 tablet by mouth daily 7/21/17   Josee Reyes MD   atorvastatin (LIPITOR) 40 MG tablet Take 1 tablet by mouth nightly 7/21/17   Josee Reyes MD   metoprolol tartrate (LOPRESSOR) 25 MG tablet Take 0.5 tablets by mouth 2 times daily 7/21/17   Josee Reyes MD   digoxin (LANOXIN) 0.05 MG/ML solution Take 2.5 mLs by mouth daily 7/21/17   Josee Reyes MD   aspirin 81 MG tablet Take 81 mg by mouth daily     Historical Provider, MD       Future Appointments  Date Time Provider Arleen Luz Marina   10/18/2017 2:45 PM MD Katie Verdin   10/19/2017 8:30 PM ROOM 5 FOR SLEEP CENTER A CDR None   10/25/2017 3:00 PM Deisi Cisneros MD [facility] GEN SURG None

## 2017-10-18 ENCOUNTER — OFFICE VISIT (OUTPATIENT)
Dept: CARDIOLOGY CLINIC | Age: 72
End: 2017-10-18

## 2017-10-18 VITALS
HEART RATE: 86 BPM | SYSTOLIC BLOOD PRESSURE: 112 MMHG | OXYGEN SATURATION: 97 % | BODY MASS INDEX: 24.74 KG/M2 | DIASTOLIC BLOOD PRESSURE: 62 MMHG | WEIGHT: 148.5 LBS | HEIGHT: 65 IN

## 2017-10-18 DIAGNOSIS — Z95.1 S/P CABG X 3: ICD-10-CM

## 2017-10-18 DIAGNOSIS — I25.10 CAD IN NATIVE ARTERY: Primary | ICD-10-CM

## 2017-10-18 DIAGNOSIS — I42.9 CARDIOMYOPATHY, UNSPECIFIED TYPE (HCC): ICD-10-CM

## 2017-10-18 PROCEDURE — 4040F PNEUMOC VAC/ADMIN/RCVD: CPT | Performed by: INTERNAL MEDICINE

## 2017-10-18 PROCEDURE — G8598 ASA/ANTIPLAT THER USED: HCPCS | Performed by: INTERNAL MEDICINE

## 2017-10-18 PROCEDURE — 99214 OFFICE O/P EST MOD 30 MIN: CPT | Performed by: INTERNAL MEDICINE

## 2017-10-18 PROCEDURE — G8420 CALC BMI NORM PARAMETERS: HCPCS | Performed by: INTERNAL MEDICINE

## 2017-10-18 PROCEDURE — 93000 ELECTROCARDIOGRAM COMPLETE: CPT | Performed by: INTERNAL MEDICINE

## 2017-10-18 PROCEDURE — 1036F TOBACCO NON-USER: CPT | Performed by: INTERNAL MEDICINE

## 2017-10-18 PROCEDURE — G8427 DOCREV CUR MEDS BY ELIG CLIN: HCPCS | Performed by: INTERNAL MEDICINE

## 2017-10-18 PROCEDURE — 3017F COLORECTAL CA SCREEN DOC REV: CPT | Performed by: INTERNAL MEDICINE

## 2017-10-18 PROCEDURE — G8484 FLU IMMUNIZE NO ADMIN: HCPCS | Performed by: INTERNAL MEDICINE

## 2017-10-18 PROCEDURE — 1123F ACP DISCUSS/DSCN MKR DOCD: CPT | Performed by: INTERNAL MEDICINE

## 2017-10-18 RX ORDER — PANTOPRAZOLE SODIUM 40 MG/1
40 TABLET, DELAYED RELEASE ORAL DAILY
COMMUNITY
End: 2019-04-05 | Stop reason: SDUPTHER

## 2017-10-18 NOTE — PROGRESS NOTES
glipiZIDE (GLUCOTROL) 5 MG tablet Take 0.5 tablets by mouth 2 times daily (before meals) 180 tablet 0    clopidogrel (PLAVIX) 75 MG tablet Take 1 tablet by mouth daily 90 tablet 0    lisinopril (PRINIVIL;ZESTRIL) 2.5 MG tablet Take 1 tablet by mouth daily 90 tablet 3    atorvastatin (LIPITOR) 40 MG tablet Take 1 tablet by mouth nightly 90 tablet 3    metoprolol tartrate (LOPRESSOR) 25 MG tablet Take 0.5 tablets by mouth 2 times daily 90 tablet 3    digoxin (LANOXIN) 0.05 MG/ML solution Take 2.5 mLs by mouth daily 2 Bottle 5    aspirin 81 MG tablet Take 81 mg by mouth daily        No facility-administered encounter medications on file as of 10/18/2017. Allergies: Iv dye [iodides]; Persantine [dipyridamole]; and Coreg [carvedilol]     Review of Systems   Constitutional: Negative. HENT: Negative. Eyes: Negative. Respiratory: Negative. Cardiovascular: Negative. Gastrointestinal: Negative. Genitourinary: Negative. Musculoskeletal: Negative. Skin: Negative. Neurological: Negative. Hematological: Negative. Psychiatric/Behavioral: Negative. /62   Pulse 86   Ht 5' 5\" (1.651 m)   Wt 148 lb 8 oz (67.4 kg)   SpO2 97%   BMI 24.71 kg/m²       Objective:  Physical Exam   Constitutional: He is oriented to person, place, and time. He appears well-developed and well-nourished. HENT:   Head: Normocephalic and atraumatic. Eyes: Pupils are equal, round, and reactive to light. Neck: Normal range of motion. Cardiovascular: Normal rate, regular rhythm and normal heart sounds. Pulmonary/Chest: Effort normal and breath sounds normal.   Abdominal: Soft. No tenderness. Musculoskeletal: Normal range of motion. He exhibits no edema. Neurological: He is alert and oriented to person, place, and time. Skin: Skin is warm and dry. Psychiatric: He has a normal mood and affect. Assessment:  1. 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  2. CHF class 2   3. LBBB    Plan:  1.

## 2017-10-20 ENCOUNTER — TELEPHONE (OUTPATIENT)
Dept: CARDIOLOGY CLINIC | Age: 72
End: 2017-10-20

## 2017-10-25 ENCOUNTER — HOSPITAL ENCOUNTER (OUTPATIENT)
Dept: SURGERY | Age: 72
Discharge: HOME | End: 2017-11-13
Attending: UROLOGY | Admitting: UROLOGY

## 2017-11-21 PROBLEM — I42.0 DILATED CARDIOMYOPATHY (HCC): Status: ACTIVE | Noted: 2017-07-26

## 2017-11-22 ENCOUNTER — TELEPHONE (OUTPATIENT)
Dept: CARDIOLOGY CLINIC | Age: 72
End: 2017-11-22

## 2017-11-22 PROCEDURE — 93284 PRGRMG EVAL IMPLANTABLE DFB: CPT | Performed by: INTERNAL MEDICINE

## 2017-11-24 ENCOUNTER — CARE COORDINATION (OUTPATIENT)
Dept: CARE COORDINATION | Age: 72
End: 2017-11-24

## 2017-11-24 ENCOUNTER — TELEPHONE (OUTPATIENT)
Dept: PHARMACY | Facility: CLINIC | Age: 72
End: 2017-11-24

## 2017-11-24 ENCOUNTER — CARE COORDINATION (OUTPATIENT)
Dept: CASE MANAGEMENT | Age: 72
End: 2017-11-24

## 2017-11-24 NOTE — TELEPHONE ENCOUNTER
Attempted to reach patient for transitions of care follow-up after discharge from [facility] on 11/22/17. Left voicemail for patient to return phone call. Will attempt to contact patient again.      Sully Melissa PharmD   [facility]  Phone: [phone] or [phone], option 7    For Pharmacy Admin Tracking Only    TCM Call Made?: Yes

## 2017-11-24 NOTE — CARE COORDINATION
[facility] ED Follow up Call, left message for patient to return call to [facility] with any questions or concerns regarding ED discharge, will await return call.       Reason for ED visit:  Post-operative pain, s/p pacemaker placement    FU appts/Provider:    Future Appointments  Date Time Provider Arleen Raza   11/29/2017 12:45 PM Jake Actis PACERS [employer]   12/18/2017 1:00 PM Marjorie Biswas, CNP [employer] [id number] [facility], [facility] 912 [phone]

## 2017-11-24 NOTE — CARE COORDINATION
[facility] Transitions Initial Follow Up Call    Call within 2 business days of discharge: Yes    Patient: Monica Recio Patient :    MRN: [id number]  Reason for Admission: s/p pacermaker   Discharge Date: 17 RARS: [facility] Risk Score: 24.75     Facility: [facility] to reach patient by phone. Message left stating purpose of call with contact information requesting return call. Noted patient had ED visit day after discharge and was not admitted. ACC and pharmacist have also been unable to reach. Outreach from [employer] will continue.      Future Appointments  Date Time Provider Arleen Raza   2017 12:45 PM Martha MAGANA   2017 1:00 PM DARNELL Snowden RN

## 2017-11-25 NOTE — CARE COORDINATION
[facility] Transitions Initial Follow Up Call    Call within 2 business days of discharge: Yes    Patient: Karol Espinal Patient : 1945   MRN: [id number]  Reason for Admission:   Discharge Date: 17 RARS: [facility] Risk Score: 24.75     CTC attempted to reach patient , left message with contact information requesting call back. Care Transition will continue to follow. Second attempt to reach patient post hospital discharge. Facility: [address] [place] [address] 24 Hour Call    Do you have all of your prescriptions and are they filled?:  No (Comment: daughter will  today )  Post Acute Services:  Home Health (Comment: [facility])  Patient DME:  Nahun Azevedo chair, Other, Tub transfer bench  Other Patient DME:  toilet raiser-but doesn't currently use any of the AD's  Do you have support at home?:  Partner/Spouse/SO, Child, Grandchild  Are you an active caregiver in your home?:  No  Care Transitions Interventions         Follow Up  Future Appointments  Date Time Provider Arleen Raza   2017 12:45 PM [facility]   2017 1:00 PM DARNELL Rogers.  MELISSA Crane, RN  Care Transition Coordinator  [phone]  Mavis Crawford RN

## 2017-11-27 ENCOUNTER — CARE COORDINATION (OUTPATIENT)
Dept: CARE COORDINATION | Age: 72
End: 2017-11-27

## 2017-11-27 DIAGNOSIS — Z95.810 CARDIAC RESYNCHRONIZATION THERAPY DEFIBRILLATOR (CRT-D) IN PLACE: ICD-10-CM

## 2017-11-27 DIAGNOSIS — Z95.810 CARDIAC RESYNCHRONIZATION THERAPY DEFIBRILLATOR (CRT-D) IN PLACE: Primary | ICD-10-CM

## 2017-11-27 NOTE — TELEPHONE ENCOUNTER
Made second attempt to reach patient for transitions of care follow-up after discharge from [facility] on 11/22/17. Left voicemail for patient to return phone call. Will send letter to patient today and sign off for now.       Luisana Willis, PharmD  [address] [place] [address] Pharmacist  [phone] or [phone] (Option 7)    CLINICAL PHARMACY NOTE   POST-DISCHARGE TELEPHONE FOLLOW-UP ADDENDUM    For Pharmacy Admin Tracking Only    TCM Call Made?: Yes  [facility]) Select Patient?: Yes  Total # of Interventions Recommended: 0  Total # Interventions Accepted: 0  Intervention Severity:   - Level 1 Intervention Present?: No   - Level 2 #: 0   - Level 3 #: 0  Outreach Status: Patient Unreachable  Care Coordinator Outreach to Patient?: Yes  Provider Contacted?: No  Time Spent (min): 5    Additional Documentation:

## 2017-11-29 ENCOUNTER — PROCEDURE VISIT (OUTPATIENT)
Dept: CARDIOLOGY CLINIC | Age: 72
End: 2017-11-29

## 2017-11-29 DIAGNOSIS — I50.42 CHRONIC COMBINED SYSTOLIC AND DIASTOLIC CONGESTIVE HEART FAILURE (HCC): Chronic | ICD-10-CM

## 2017-11-29 DIAGNOSIS — I42.0 DILATED CARDIOMYOPATHY (HCC): ICD-10-CM

## 2017-11-29 DIAGNOSIS — Z95.810 CARDIAC RESYNCHRONIZATION THERAPY DEFIBRILLATOR (CRT-D) IN PLACE: Primary | ICD-10-CM

## 2017-11-29 PROCEDURE — 93284 PRGRMG EVAL IMPLANTABLE DFB: CPT | Performed by: INTERNAL MEDICINE

## 2017-11-29 NOTE — PROGRESS NOTES
Postop check, steri strips removed. Incision clean, dry and intact. No redness, swelling or drainage noted. Postop care/restrictions and carelink reviewed. Patient comes in for programming evaluation for his medtronic BIV-defibrillator. All sensing and pacing parameters are within normal range. Programmed rate changed from 50-60 bpm.. Please see interrogation for more detail. No new arrhythmias. Patient will follow up in 3 months in office or remotely.

## 2017-11-30 ENCOUNTER — CARE COORDINATION (OUTPATIENT)
Dept: CARE COORDINATION | Age: 72
End: 2017-11-30

## 2017-12-01 ENCOUNTER — CARE COORDINATION (OUTPATIENT)
Dept: CARE COORDINATION | Age: 72
End: 2017-12-01

## 2017-12-01 NOTE — CARE COORDINATION
General Assessment       Left message with daughter regarding follow up call for ED visit complications post procedure for pacemaker placement. Patient not available at this time. She stated he is doing well and has followed up with cardiology. ACC contact information provided. No current home services and independent in care at this time. Pacer site is healing and he has completed follow up.      Lab Results   Component Value Date    LABA1C 8.2 06/24/2017    LABA1C 10.8 02/21/2017    LABA1C 7.6 11/11/2016     Lab Results   Component Value Date    .6 06/24/2017    .3 02/21/2017    .4 11/11/2016             Plan   additional follow up call to be completed with the patient to discuss  Diabetes and chf follow up

## 2017-12-18 ENCOUNTER — OFFICE VISIT (OUTPATIENT)
Dept: CARDIOLOGY CLINIC | Age: 72
End: 2017-12-18

## 2017-12-18 VITALS
SYSTOLIC BLOOD PRESSURE: 100 MMHG | HEIGHT: 65 IN | HEART RATE: 102 BPM | OXYGEN SATURATION: 98 % | BODY MASS INDEX: 24.49 KG/M2 | DIASTOLIC BLOOD PRESSURE: 60 MMHG | WEIGHT: 147 LBS

## 2017-12-18 DIAGNOSIS — D64.9 ANEMIA, UNSPECIFIED TYPE: ICD-10-CM

## 2017-12-18 DIAGNOSIS — Z95.810 CARDIAC RESYNCHRONIZATION THERAPY DEFIBRILLATOR (CRT-D) IN PLACE: ICD-10-CM

## 2017-12-18 DIAGNOSIS — Z95.1 S/P CABG X 3: ICD-10-CM

## 2017-12-18 DIAGNOSIS — I42.0 DILATED CARDIOMYOPATHY (HCC): Primary | ICD-10-CM

## 2017-12-18 DIAGNOSIS — I50.42 CHRONIC COMBINED SYSTOLIC AND DIASTOLIC CONGESTIVE HEART FAILURE (HCC): Chronic | ICD-10-CM

## 2017-12-18 DIAGNOSIS — R00.0 TACHYCARDIA: ICD-10-CM

## 2017-12-18 PROCEDURE — 99024 POSTOP FOLLOW-UP VISIT: CPT | Performed by: NURSE PRACTITIONER

## 2017-12-18 RX ORDER — METOPROLOL SUCCINATE 25 MG/1
25 TABLET, EXTENDED RELEASE ORAL NIGHTLY
Qty: 30 TABLET | Refills: 11
Start: 2017-12-18 | End: 2019-04-05 | Stop reason: SDUPTHER

## 2017-12-18 RX ORDER — DIGOXIN 250 MCG
250 TABLET ORAL DAILY
Qty: 90 TABLET | Refills: 1 | Status: SHIPPED | OUTPATIENT
Start: 2017-12-18 | End: 2018-02-19 | Stop reason: SDUPTHER

## 2017-12-18 NOTE — PROGRESS NOTES
[address]   Cardiac Follow-up    Primary Care Doctor: Garland Ignacio MD    Chief Complaint   Patient presents with   [address] from [facility]     11/23/17  pacemaker placement    Fatigue        History of Present Illness:   I had the pleasure of seeing Papi Murray in follow up for hospital follow up. He  has a history of ischemic cardiomyopathy, CAD, CVA, HTN, LBBB, chronic systolic CHF, NSVT, and FARNAZ. He had a CABG in 9/2016. EF was 33%. his EF remained 20% on MUGA in 9/2017. On 11/21/2017 he had a biventricular ICD implanted. At discharge his metoprolol was changed to Toprol XL 25 mg daily. His main complaint is fatigue. No chest pain. Sleeping through the day. No lightheadedness or dizziness. No appetite, issues with teeth and swallowing. To go to the [place] for dental assessment, for possible dentures. No edema. Papi Murray describes symptoms including fatigue, early saiety but denies chest pain, dyspnea, palpitations, orthopnea, syncope. NYHA:   II  ACC/ AHA Stage:    C    Past Medical History:   has a past medical history of Abnormal echocardiogram; Abnormal stress test; Cardiomyopathy ([address] CHF (congestive heart failure) ([address] Diabetes ([address] Hyperlipidemia; and Hypertension. Surgical History:   has a past surgical history that includes Neck surgery; Colonoscopy (07/16/2016); Coronary artery bypass graft (09/16/2016); and Upper gastrointestinal endoscopy (05/22/2017). Social History:   reports that he has never smoked. He has never used smokeless tobacco. He reports that he does not drink alcohol or use drugs. Family History:   Family History   Problem Relation Age of Onset    Diabetes Father     Heart Disease Father     Cancer Father     High Blood Pressure Father     Diabetes Sister     Diabetes Brother     Cancer Brother      pancreatic       Home Medications:  Prior to Admission medications    Medication Sig Start Date End Date Taking?  Authorizing PROBNP    Recent Testing:   MUGA scan on 9/28/17 showed an EF of 20%. STRESS TEST: 8/9/2016     Summary  Small-moderate sized apical, inferoapical, and inferoseptal fixed defects  consistent with infarction in the territory of the mid LAD and/or LCx. There  is severe global LV systolic dysfunction with ejection fraction of 23%. There is asynchronous septal motion which may be due to LBBB and severe HK  of the apex. Higher risk abnormal study. CATH: 09/13/16  ANGIOGRAPHIC FINDINGS:   1. Multivessel critical coronary artery disease with ostial LAD and critical  circ and right coronary artery disease. 2. Reduced left ventricular function with an EF of 20% and global left  ventricular function. 3. Normal left and right heart hemodynamics. BYPASS: 09/16/16  Urgent CABG X 3, with pedicled LIMA to LAD, sequential Greater Saphenous VG to OM 2 then on to PV br of RCA, SGC, CPB, EVH Right Greater Saphenous vein, MOUNIKA, Epiaortic ultrasound, Doppler verification of grafts, Bilateral 5 level intercostal nerve block(Exparel), Platelet gel application. VASCULAR:  Carotid: 09/13/16  Summary    1. There is minimal plaque seen in the right internal carotid artery with an  estimated diameter reduction of <50%. 2. There is minimal plaque seen in the left internal carotid artery with an  estimated diameter reduction of <50%. 3. The vertebral arteries are patent with antegrade flow bilaterally. Pertinent Problems:  · ischemic cardiomyopathy, chronic systolic CHF. LVEF 20% on MUGA  · CAD s/p CABG in 9/2016. · CVA  · HTN  · NSVT, LBBB s/p biventricular ICD 11/2017  · FARNAZ  · Anemia    Visit Diagnosis:    1. Dilated cardiomyopathy ([address]    2. CAD s/p CABGx3 (2016)    3. Cardiac resynchronization therapy defibrillator (CRT-D) in place    4. Chronic combined systolic (EF 25%) & diastolic (grade 1 LVDD) CHF    5. Anemia, unspecified type    6. Tachycardia        Plan:   1.  Change metoprolol XL to bedtime; unable to increase dose due to hypotension. Will continue low dose lisinopril 2.5mg daily, unable to increase due to hypotension. 2. Increase Digoxin to 250 mcg daily (okay to take 2 tabs of the 125mcg daily); can consider Corlanor for HR   3. Check BMP in the next 1 month, check iron and CBC   4. Follow up 2 months      QUALITY MEASURES  1. Tobacco Cessation Counseling: NA  2. Retake of BP if >140/90:   NA  3. Documentation to PCP/referring for new patient:  Sent to PCP at close of office visit  4. CAD patient on anti-platelet: Yes  5. CAD patient on STATIN therapy:  Yes  6. Patient with CHF and aFib on anticoagulation:  NA     I appreciate the opportunity for caring for this patient.      Jes Ramírez CNP, 12/18/2017, 3:23 PM

## 2017-12-18 NOTE — LETTER
[address] Cardiology - [address]6 [facility] [address] [address]  Phone: [phone]  Fax: 9023 [facility], 6300 [facility]        December 21, 2017     Doree Hodgkin, [address] [place], [place] [id number]    Patient: Carline Adams  MR Number: [id number]  YOB: 1945  Date of Visit: 12/18/2017    Dear Dr. Doree Hodgkin:    Thank you for the request for consultation for Ridge Hayes to me for the evaluation of cardiomyopathy. Below are the relevant portions of my assessment and plan of care. Visit Diagnosis:    1. Dilated cardiomyopathy ([facility] [facility]    2. CAD s/p CABGx3 (2016)    3. Cardiac resynchronization therapy defibrillator (CRT-D) in place    4. Chronic combined systolic (EF 66%) & diastolic (grade 1 LVDD) CHF    5. Anemia, unspecified type    6. Tachycardia          Plan:   1. Change metoprolol XL to bedtime; unable to increase dose due to hypotension. Will continue low dose lisinopril 2.5mg daily, unable to increase due to hypotension. 2. Increase Digoxin to 250 mcg daily (okay to take 2 tabs of the 125mcg daily); can consider Corlanor for HR   3. Check BMP in the next 1 month, check iron and CBC   4. Follow up 2 months        QUALITY MEASURES  1. Tobacco Cessation Counseling: NA  2. Retake of BP if >140/90:   NA  3. Documentation to PCP/referring for new patient:  Sent to PCP at close of office visit  4. CAD patient on anti-platelet: Yes  5. CAD patient on STATIN therapy:  Yes  6. Patient with CHF and aFib on anticoagulation:  NA      I appreciate the opportunity for caring for this patient.      Nik Zavala CNP, 12/18/2017, 3:23 PM    If you have questions, please do not hesitate to call me. I look forward to following Namita Persons along with you.     Sincerely,        Nik Zavala CNP

## 2018-01-04 ENCOUNTER — TELEPHONE (OUTPATIENT)
Dept: CARDIOLOGY CLINIC | Age: 73
End: 2018-01-04

## 2018-01-04 NOTE — TELEPHONE ENCOUNTER
Car Cronin is seeing the patient in February. He needs to have several teeth pulled. He is currently on ASA and plavix. They would like to stop at least one of those meds if possible 3 to 5 days prior to the procedure. She is asking for your recommendations.

## 2018-01-05 ENCOUNTER — CARE COORDINATION (OUTPATIENT)
Dept: CARE COORDINATION | Age: 73
End: 2018-01-05

## 2018-02-09 ENCOUNTER — OFFICE VISIT (OUTPATIENT)
Dept: CARDIOLOGY CLINIC | Age: 73
End: 2018-02-09

## 2018-02-09 ENCOUNTER — PROCEDURE VISIT (OUTPATIENT)
Dept: CARDIOLOGY CLINIC | Age: 73
End: 2018-02-09

## 2018-02-09 VITALS
HEART RATE: 88 BPM | OXYGEN SATURATION: 98 % | SYSTOLIC BLOOD PRESSURE: 134 MMHG | BODY MASS INDEX: 23.82 KG/M2 | DIASTOLIC BLOOD PRESSURE: 60 MMHG | HEIGHT: 65 IN | WEIGHT: 143 LBS

## 2018-02-09 DIAGNOSIS — I25.10 CAD IN NATIVE ARTERY: Primary | ICD-10-CM

## 2018-02-09 DIAGNOSIS — Z95.810 CARDIAC RESYNCHRONIZATION THERAPY DEFIBRILLATOR (CRT-D) IN PLACE: Primary | ICD-10-CM

## 2018-02-09 DIAGNOSIS — I50.42 CHRONIC COMBINED SYSTOLIC AND DIASTOLIC CONGESTIVE HEART FAILURE (HCC): Chronic | ICD-10-CM

## 2018-02-09 DIAGNOSIS — I42.0 DILATED CARDIOMYOPATHY (HCC): ICD-10-CM

## 2018-02-09 PROCEDURE — 93284 PRGRMG EVAL IMPLANTABLE DFB: CPT | Performed by: INTERNAL MEDICINE

## 2018-02-09 PROCEDURE — 99214 OFFICE O/P EST MOD 30 MIN: CPT | Performed by: INTERNAL MEDICINE

## 2018-02-15 NOTE — COMMUNICATION BODY
[address]   Cardiac follow up     Referring Provider:  Francis Chambers MD         HPI:  Delmi Celis is a 67 y.o. male who was referred by Dr. Virgil Rodriguez for evaluation SCA risk. He has CAD and underwent CABS on 9/16/16 for 3 vessel CAD. MRI in 2016 showed an EF of 23%. His Echo on 6/26/17 showed an EF 33%. He wore a cardiac event monitor from 6/29-7/12/17, average heart rate was 79 (), NSVT. He currently feels well. He denies chest pain, palpitations, dizziness or syncope. He is not very active, but denies SOB or excessive fatigue. His daughter states he does not complain and will not tell if he has chest pain. He states he has dizziness when he stands up fast or rolls over in bed fast. He has had a sleep study and will be getting a CPAP machine. His EKG today shows sinus rhythm with LBBB. MUGA scan on 9/28/17 showed an EF of 20%. He denies dizziness or syncope. He underwent BiV ICD placement 11/2/2017. His device check from today shows normal device function with no issues. He feels he has recovered well from his procedure. He has been taking his medications as prescribed. He attempts to remain as active as possible. He has had a decreased appetite and lost 5 pounds in the last month. He attributes this to his dental issues. He denies chest pain, palpitations, syncope, dizziness, shortness of breath or edema. Past Medical History:   has a past medical history of Abnormal echocardiogram; Abnormal stress test; Cardiomyopathy ([address] CHF (congestive heart failure) ([address] Diabetes ([facility] [address] Hyperlipidemia; and Hypertension. Surgical History:   has a past surgical history that includes Neck surgery; Colonoscopy (07/16/2016); Coronary artery bypass graft (09/16/2016); and Upper gastrointestinal endoscopy (05/22/2017). Social History:   reports that he has never smoked. He has never used smokeless tobacco. He reports that he does not drink alcohol or use drugs.      Family sounds normal.   Abdominal: Soft. No tenderness. Musculoskeletal: Normal range of motion. He exhibits no edema. Neurological: He is alert and oriented to person, place, and time. Skin: Skin is warm and dry. Psychiatric: He has a normal mood and affect. Assessment:  1. 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   2. S/p BiV iCD placement 11/21/2017. He has excellent lead placement and substantial improvement in his QRS duration by ECG, but has not had significant clinical improvement. 2. CHF- NYHA  class 2   3. LBBB    Plan:  1. Repeat Echocardiogram the end of February. 2. Continue current medications. 3. Remain as active as possible. 4. Follow up with me in 1 year.      Monie Flores M.D.

## 2018-02-19 ENCOUNTER — OFFICE VISIT (OUTPATIENT)
Dept: CARDIOLOGY CLINIC | Age: 73
End: 2018-02-19

## 2018-02-19 ENCOUNTER — PROCEDURE VISIT (OUTPATIENT)
Dept: CARDIOLOGY CLINIC | Age: 73
End: 2018-02-19

## 2018-02-19 VITALS
HEIGHT: 65 IN | OXYGEN SATURATION: 97 % | HEART RATE: 75 BPM | WEIGHT: 144 LBS | SYSTOLIC BLOOD PRESSURE: 100 MMHG | BODY MASS INDEX: 23.99 KG/M2 | DIASTOLIC BLOOD PRESSURE: 60 MMHG

## 2018-02-19 DIAGNOSIS — Z95.810 CARDIAC RESYNCHRONIZATION THERAPY DEFIBRILLATOR (CRT-D) IN PLACE: ICD-10-CM

## 2018-02-19 DIAGNOSIS — Z51.81 MEDICATION MONITORING ENCOUNTER: ICD-10-CM

## 2018-02-19 DIAGNOSIS — I50.42 CHRONIC COMBINED SYSTOLIC AND DIASTOLIC CONGESTIVE HEART FAILURE (HCC): Chronic | ICD-10-CM

## 2018-02-19 DIAGNOSIS — I25.10 CORONARY ARTERY DISEASE INVOLVING NATIVE CORONARY ARTERY OF NATIVE HEART WITHOUT ANGINA PECTORIS: ICD-10-CM

## 2018-02-19 DIAGNOSIS — I50.42 CHRONIC COMBINED SYSTOLIC AND DIASTOLIC CONGESTIVE HEART FAILURE (HCC): Primary | Chronic | ICD-10-CM

## 2018-02-19 DIAGNOSIS — D64.9 ANEMIA, UNSPECIFIED TYPE: ICD-10-CM

## 2018-02-19 DIAGNOSIS — Z95.810 CARDIAC RESYNCHRONIZATION THERAPY DEFIBRILLATOR (CRT-D) IN PLACE: Primary | ICD-10-CM

## 2018-02-19 PROCEDURE — 99214 OFFICE O/P EST MOD 30 MIN: CPT | Performed by: NURSE PRACTITIONER

## 2018-02-19 PROCEDURE — G8427 DOCREV CUR MEDS BY ELIG CLIN: HCPCS | Performed by: NURSE PRACTITIONER

## 2018-02-19 PROCEDURE — G8484 FLU IMMUNIZE NO ADMIN: HCPCS | Performed by: NURSE PRACTITIONER

## 2018-02-19 PROCEDURE — G8420 CALC BMI NORM PARAMETERS: HCPCS | Performed by: NURSE PRACTITIONER

## 2018-02-19 PROCEDURE — 3017F COLORECTAL CA SCREEN DOC REV: CPT | Performed by: NURSE PRACTITIONER

## 2018-02-19 PROCEDURE — 1036F TOBACCO NON-USER: CPT | Performed by: NURSE PRACTITIONER

## 2018-02-19 PROCEDURE — 4040F PNEUMOC VAC/ADMIN/RCVD: CPT | Performed by: NURSE PRACTITIONER

## 2018-02-19 PROCEDURE — 93290 INTERROG DEV EVAL ICPMS IP: CPT | Performed by: NURSE PRACTITIONER

## 2018-02-19 PROCEDURE — 1123F ACP DISCUSS/DSCN MKR DOCD: CPT | Performed by: NURSE PRACTITIONER

## 2018-02-19 PROCEDURE — G8598 ASA/ANTIPLAT THER USED: HCPCS | Performed by: NURSE PRACTITIONER

## 2018-02-19 RX ORDER — LISINOPRIL 2.5 MG/1
2.5 TABLET ORAL DAILY
Qty: 90 TABLET | Refills: 3 | Status: SHIPPED | OUTPATIENT
Start: 2018-02-19 | End: 2018-11-27 | Stop reason: DRUGHIGH

## 2018-02-19 RX ORDER — DIGOXIN 125 MCG
125 TABLET ORAL DAILY
Qty: 90 TABLET | Refills: 1 | Status: SHIPPED | OUTPATIENT
Start: 2018-02-19 | End: 2019-04-05 | Stop reason: SDUPTHER

## 2018-02-19 RX ORDER — ATORVASTATIN CALCIUM 40 MG/1
40 TABLET, FILM COATED ORAL NIGHTLY
Qty: 90 TABLET | Refills: 3 | Status: SHIPPED | OUTPATIENT
Start: 2018-02-19 | End: 2019-04-05 | Stop reason: SDUPTHER

## 2018-02-19 NOTE — PROGRESS NOTES
75   SpO2: 97%   Weight: 144 lb (65.3 kg)   Height: 5' 5\" (1.651 m)        Constitutional and General Appearance: no apparent distress, thin  HEENT: non-icteric sclera, oropharynx without exudate, oral mucosa moist  Neck: JVP less than 8 cm H20  Respiratory:  · No use of accessory muscles  · Clear breath sounds throughout, no wheezing, no crackles, no rhonchi  Cardiovascular:  · The apical impulses not displaced  · Heart tones are crisp and normal, no murmur/rub/gallop  · Reg rate and rhythm, S1,S2 normal  · Radial pulses 2+ and equal bilaterally  · No edema  · Pedal Pulses: 2+ and equal   Abdomen:  · No masses or tenderness  · Liver: No Abnormalities Noted  Musculoskeletal/Skin:  · Gait is slow  · There is no clubbing, cyanosis of the extremities  · Skin is warm and dry  · Moves all extremities well  Neurological/Psychiatric:  · Alert and oriented in all spheres  · No abnormalities of mood, affect, memory, mentation, or behavior are noted    Lab Data:  CBC:   Lab Results   Component Value Date    WBC 9.3 11/21/2017    WBC 7.7 10/11/2017    WBC 7.5 06/28/2017    RBC 5.50 11/21/2017    RBC 4.71 10/11/2017    RBC 4.68 06/28/2017    HGB 15.4 11/21/2017    HGB 13.7 10/11/2017    HGB 13.4 06/28/2017    HCT 46.3 11/21/2017    HCT 39.6 10/11/2017    HCT 39.5 06/28/2017    MCV 84.1 11/21/2017    MCV 84.1 10/11/2017    MCV 84.4 06/28/2017    RDW 14.3 11/21/2017    RDW 13.9 10/11/2017    RDW 13.9 06/28/2017     11/21/2017     10/11/2017     06/28/2017     BMP:   Lab Results   Component Value Date     11/21/2017     10/11/2017     06/28/2017    K 3.8 11/21/2017    K 3.8 10/11/2017    K 3.9 06/28/2017     11/21/2017    CL 99 10/11/2017     06/28/2017    CO2 28 11/21/2017    CO2 28 10/11/2017    CO2 21 06/28/2017    PHOS 3.0 06/28/2017    PHOS 2.9 06/27/2017    PHOS 3.4 06/26/2017    BUN 8 11/21/2017    BUN 8 10/11/2017    BUN 19 06/28/2017    CREATININE 0.9 11/21/2017

## 2018-03-01 ENCOUNTER — HOSPITAL ENCOUNTER (OUTPATIENT)
Dept: CARDIOLOGY | Facility: CLINIC | Age: 73
Discharge: HOME | End: 2018-03-01
Attending: INTERNAL MEDICINE | Admitting: INTERNAL MEDICINE

## 2018-03-01 DIAGNOSIS — I42.0 DILATED CARDIOMYOPATHY (HCC): ICD-10-CM

## 2018-03-01 LAB
LEFT VENTRICULAR EJECTION FRACTION HIGH VALUE: 40 %
LEFT VENTRICULAR EJECTION FRACTION MODE: NORMAL
LV EF: 40 %
LVEF MODALITY: NORMAL

## 2018-03-12 ENCOUNTER — CARE COORDINATION (OUTPATIENT)
Dept: CARE COORDINATION | Age: 73
End: 2018-03-12

## 2018-03-27 ENCOUNTER — TELEPHONE (OUTPATIENT)
Dept: INTERNAL MEDICINE CLINIC | Age: 73
End: 2018-03-27

## 2018-03-27 PROBLEM — E44.1 MILD MALNUTRITION (HCC): Chronic | Status: ACTIVE | Noted: 2018-03-27

## 2018-03-27 NOTE — TELEPHONE ENCOUNTER
----- Message from Kelli Norton MD sent at 3/27/2018  2:40 PM EDT -----  Contact: daughter [phone]  Unfortunately I dont have privileges at [facility]  She should complain to charge nurse or supervisor about this issue    ----- Message -----  From: Castro Angel  Sent: 3/27/2018  12:32 PM  To: Kelli Norton MD    Pt has been admitted into [facility] for a severe UTI and other complications. Daughter is having issues with the doctor that seeing her father. He is rude, and she doesn't feel like he listens. He rushes through everything and daughter feels like her father is not getting good care. They are talking about discharging him tomorrow and she does not feel like he is ready to come home. He has heart issues, and she feels like they are not addressing a lot of the issues he is in there for. She wanted to know if you would be able to come in and give them a second opinion, or if you had any other suggestions on how to get another doctor in there to care for him?   Please call Duane Quintana, daughter, at [phone]

## 2018-03-29 ENCOUNTER — TELEPHONE (OUTPATIENT)
Dept: CARDIOLOGY CLINIC | Age: 73
End: 2018-03-29

## 2018-03-29 ENCOUNTER — CARE COORDINATION (OUTPATIENT)
Dept: CASE MANAGEMENT | Age: 73
End: 2018-03-29

## 2018-03-29 NOTE — CARE COORDINATION
[address] Transitions Initial Follow Up Call    Call within 2 business days of discharge: Yes    Patient: Jonathan Sánchez Patient :    MRN: [id number]  Reason for Admission: UTI  Discharge Date: 3/28/18 RARS: [facility] Risk Score: 24.75     Attempted to reach patient for 24hr post hospital transition call, no answer. VM left stating purpose of call along with my contact information requesting a return call.         Follow Up  Future Appointments  Date Time Provider Arleen Raza   2018 3:30 PM MD [facility] Int None   2018 9:30 AM SCHEDULE, [facility] PHONE TRANSMISSION Nano MAGANA   2018 10:00 AM DARNELL Mcneal RN

## 2018-03-30 NOTE — CARE COORDINATION
[address] Transitions Initial Follow Up Call    Call within 2 business days of discharge: Yes    Patient: Fatemeh Castro Patient : 1945   MRN: [id number]  Reason for Admission: UTI   Discharge Date: 3/28/18 RARS: [facility] Risk Score: 24.75       2nd attempt made to reach patient for initial post hospital transition call, no answer. VM left stating purpose of call along with my contact information requesting a return call.         Follow Up  Future Appointments  Date Time Provider Arleen Raza   4/3/2018 3:30 PM Fermín Johnson MD [place] [facility] None   2018 9:30 AM SCHEDULE, [facility] PHONE TRANSMISSION Renuka MAGANA   2018 10:00 AM DARNELL Dodson RN

## 2018-04-03 ENCOUNTER — OFFICE VISIT (OUTPATIENT)
Dept: INTERNAL MEDICINE CLINIC | Age: 73
End: 2018-04-03

## 2018-04-03 VITALS
BODY MASS INDEX: 22.99 KG/M2 | HEIGHT: 65 IN | RESPIRATION RATE: 18 BRPM | DIASTOLIC BLOOD PRESSURE: 75 MMHG | HEART RATE: 60 BPM | WEIGHT: 138 LBS | SYSTOLIC BLOOD PRESSURE: 110 MMHG

## 2018-04-03 DIAGNOSIS — I10 ESSENTIAL HYPERTENSION: Chronic | ICD-10-CM

## 2018-04-03 DIAGNOSIS — E78.2 MIXED HYPERLIPIDEMIA: Chronic | ICD-10-CM

## 2018-04-03 DIAGNOSIS — I25.10 CAD IN NATIVE ARTERY: ICD-10-CM

## 2018-04-03 DIAGNOSIS — N40.0 PROSTATE ENLARGEMENT: ICD-10-CM

## 2018-04-03 DIAGNOSIS — Z09 HOSPITAL DISCHARGE FOLLOW-UP: Primary | ICD-10-CM

## 2018-04-03 DIAGNOSIS — I50.42 CHRONIC COMBINED SYSTOLIC AND DIASTOLIC CONGESTIVE HEART FAILURE (HCC): Chronic | ICD-10-CM

## 2018-04-03 DIAGNOSIS — E11.9 TYPE II DIABETES MELLITUS, WELL CONTROLLED (HCC): ICD-10-CM

## 2018-04-03 DIAGNOSIS — R39.9 UTI SYMPTOMS: ICD-10-CM

## 2018-04-03 LAB
BILIRUBIN, POC: NORMAL
BLOOD URINE, POC: NORMAL
CLARITY, POC: NORMAL
COLOR, POC: NORMAL
GLUCOSE URINE, POC: NORMAL
KETONES, POC: NORMAL
LEUKOCYTE EST, POC: NORMAL
NITRITE, POC: NORMAL
PH, POC: NORMAL
PROTEIN, POC: NORMAL
SPECIFIC GRAVITY, POC: NORMAL
UROBILINOGEN, POC: NORMAL

## 2018-04-03 PROCEDURE — 81002 URINALYSIS NONAUTO W/O SCOPE: CPT | Performed by: INTERNAL MEDICINE

## 2018-04-03 PROCEDURE — 1111F DSCHRG MED/CURRENT MED MERGE: CPT | Performed by: INTERNAL MEDICINE

## 2018-04-03 PROCEDURE — 99496 TRANSJ CARE MGMT HIGH F2F 7D: CPT | Performed by: INTERNAL MEDICINE

## 2018-04-04 ENCOUNTER — PROCEDURE VISIT (OUTPATIENT)
Dept: CARDIOLOGY CLINIC | Age: 73
End: 2018-04-04

## 2018-04-04 ENCOUNTER — TELEPHONE (OUTPATIENT)
Dept: CARDIOLOGY CLINIC | Age: 73
End: 2018-04-04

## 2018-04-04 DIAGNOSIS — Z95.810 CARDIAC RESYNCHRONIZATION THERAPY DEFIBRILLATOR (CRT-D) IN PLACE: Primary | ICD-10-CM

## 2018-05-22 ENCOUNTER — NURSE ONLY (OUTPATIENT)
Dept: CARDIOLOGY CLINIC | Age: 73
End: 2018-05-22

## 2018-05-22 DIAGNOSIS — I50.42 CHRONIC COMBINED SYSTOLIC AND DIASTOLIC CONGESTIVE HEART FAILURE (HCC): Chronic | ICD-10-CM

## 2018-05-22 DIAGNOSIS — Z95.810 CARDIAC RESYNCHRONIZATION THERAPY DEFIBRILLATOR (CRT-D) IN PLACE: Primary | ICD-10-CM

## 2018-05-22 DIAGNOSIS — I42.0 DILATED CARDIOMYOPATHY (HCC): ICD-10-CM

## 2018-05-22 PROCEDURE — 93295 DEV INTERROG REMOTE 1/2/MLT: CPT | Performed by: INTERNAL MEDICINE

## 2018-05-22 PROCEDURE — 93297 REM INTERROG DEV EVAL ICPMS: CPT | Performed by: INTERNAL MEDICINE

## 2018-05-22 PROCEDURE — 93296 REM INTERROG EVL PM/IDS: CPT | Performed by: INTERNAL MEDICINE

## 2018-06-27 ENCOUNTER — OFFICE VISIT (OUTPATIENT)
Dept: CARDIOLOGY CLINIC | Age: 73
End: 2018-06-27

## 2018-06-27 VITALS
HEART RATE: 82 BPM | OXYGEN SATURATION: 96 % | HEIGHT: 65 IN | WEIGHT: 140.8 LBS | DIASTOLIC BLOOD PRESSURE: 62 MMHG | BODY MASS INDEX: 23.46 KG/M2 | SYSTOLIC BLOOD PRESSURE: 118 MMHG

## 2018-06-27 DIAGNOSIS — Z95.1 S/P CABG X 3: ICD-10-CM

## 2018-06-27 DIAGNOSIS — I10 ESSENTIAL HYPERTENSION: Chronic | ICD-10-CM

## 2018-06-27 DIAGNOSIS — I50.42 CHRONIC COMBINED SYSTOLIC AND DIASTOLIC CONGESTIVE HEART FAILURE (HCC): Primary | Chronic | ICD-10-CM

## 2018-06-27 DIAGNOSIS — Z95.810 CARDIAC RESYNCHRONIZATION THERAPY DEFIBRILLATOR (CRT-D) IN PLACE: ICD-10-CM

## 2018-06-27 DIAGNOSIS — I42.0 DILATED CARDIOMYOPATHY (HCC): ICD-10-CM

## 2018-06-27 PROCEDURE — 99213 OFFICE O/P EST LOW 20 MIN: CPT | Performed by: NURSE PRACTITIONER

## 2018-06-27 PROCEDURE — G8427 DOCREV CUR MEDS BY ELIG CLIN: HCPCS | Performed by: NURSE PRACTITIONER

## 2018-06-27 PROCEDURE — G8420 CALC BMI NORM PARAMETERS: HCPCS | Performed by: NURSE PRACTITIONER

## 2018-06-27 PROCEDURE — 1036F TOBACCO NON-USER: CPT | Performed by: NURSE PRACTITIONER

## 2018-06-27 PROCEDURE — 1123F ACP DISCUSS/DSCN MKR DOCD: CPT | Performed by: NURSE PRACTITIONER

## 2018-06-27 PROCEDURE — G8598 ASA/ANTIPLAT THER USED: HCPCS | Performed by: NURSE PRACTITIONER

## 2018-06-27 PROCEDURE — 4040F PNEUMOC VAC/ADMIN/RCVD: CPT | Performed by: NURSE PRACTITIONER

## 2018-06-27 PROCEDURE — 3017F COLORECTAL CA SCREEN DOC REV: CPT | Performed by: NURSE PRACTITIONER

## 2018-06-27 RX ORDER — FINASTERIDE 5 MG/1
5 TABLET, FILM COATED ORAL DAILY
COMMUNITY
End: 2019-04-05 | Stop reason: SDUPTHER

## 2018-08-21 ENCOUNTER — NURSE ONLY (OUTPATIENT)
Dept: CARDIOLOGY CLINIC | Age: 73
End: 2018-08-21

## 2018-08-21 DIAGNOSIS — I50.42 CHRONIC COMBINED SYSTOLIC AND DIASTOLIC CONGESTIVE HEART FAILURE (HCC): ICD-10-CM

## 2018-08-21 DIAGNOSIS — I42.0 DILATED CARDIOMYOPATHY (HCC): ICD-10-CM

## 2018-08-21 DIAGNOSIS — Z95.810 CARDIAC RESYNCHRONIZATION THERAPY DEFIBRILLATOR (CRT-D) IN PLACE: ICD-10-CM

## 2018-08-21 PROCEDURE — 93297 REM INTERROG DEV EVAL ICPMS: CPT | Performed by: INTERNAL MEDICINE

## 2018-08-21 PROCEDURE — 93295 DEV INTERROG REMOTE 1/2/MLT: CPT | Performed by: INTERNAL MEDICINE

## 2018-08-21 PROCEDURE — 93296 REM INTERROG EVL PM/IDS: CPT | Performed by: INTERNAL MEDICINE

## 2018-08-24 ENCOUNTER — HOSPITAL ENCOUNTER (OUTPATIENT)
Age: 73
Discharge: HOME | End: 2018-08-24
Payer: MEDICARE

## 2018-08-24 DIAGNOSIS — Z95.1 S/P CABG X 3: ICD-10-CM

## 2018-08-24 DIAGNOSIS — I50.42 CHRONIC COMBINED SYSTOLIC AND DIASTOLIC CONGESTIVE HEART FAILURE (HCC): Chronic | ICD-10-CM

## 2018-08-24 DIAGNOSIS — Z95.810 CARDIAC RESYNCHRONIZATION THERAPY DEFIBRILLATOR (CRT-D) IN PLACE: ICD-10-CM

## 2018-08-24 DIAGNOSIS — I42.0 DILATED CARDIOMYOPATHY (HCC): ICD-10-CM

## 2018-08-24 DIAGNOSIS — I10 ESSENTIAL HYPERTENSION: Chronic | ICD-10-CM

## 2018-08-24 PROCEDURE — 80048 BASIC METABOLIC PNL TOTAL CA: CPT

## 2018-08-24 PROCEDURE — 36415 COLL VENOUS BLD VENIPUNCTURE: CPT

## 2018-08-25 LAB
ANION GAP SERPL CALCULATED.3IONS-SCNC: 14 MMOL/L (ref 3–16)
BUN BLDV-MCNC: 10 MG/DL (ref 7–20)
CALCIUM SERPL-MCNC: 9.9 MG/DL (ref 8.3–10.6)
CHLORIDE BLD-SCNC: 95 MMOL/L (ref 99–110)
CO2: 25 MMOL/L (ref 21–32)
CREAT SERPL-MCNC: 1.4 MG/DL (ref 0.8–1.3)
GFR AFRICAN AMERICAN: 60
GFR NON-AFRICAN AMERICAN: 50
GLUCOSE BLD-MCNC: 343 MG/DL (ref 70–99)
POTASSIUM SERPL-SCNC: 4.4 MMOL/L (ref 3.5–5.1)
SODIUM BLD-SCNC: 134 MMOL/L (ref 136–145)

## 2018-08-27 ENCOUNTER — TELEPHONE (OUTPATIENT)
Dept: CARDIOLOGY CLINIC | Age: 73
End: 2018-08-27

## 2018-09-05 ENCOUNTER — TELEPHONE (OUTPATIENT)
Dept: CARDIOLOGY CLINIC | Age: 73
End: 2018-09-05

## 2018-09-05 NOTE — TELEPHONE ENCOUNTER
Left message for pt to return call. Will forward to [facility] when we speak to pt.

## 2018-11-19 NOTE — PROGRESS NOTES
thirst or urination. · Hematologic/Lymphatic: No abnormal bruising or bleeding, blood clots or swollen lymph nodes.     Physical Examination:    Vitals:    11/20/18 0840   BP: 130/62   Pulse: 67   SpO2: 97%   Weight: 141 lb 9.6 oz (64.2 kg)   Height: 5' 5\" (1.651 m)        Constitutional and General Appearance: no apparent distress, thin  HEENT: non-icteric sclera, oropharynx without exudate, oral mucosa moist  Neck: JVP less than 8 cm H20  Respiratory:  · No use of accessory muscles  · Clear breath sounds throughout, no wheezing, no crackles, no rhonchi  Cardiovascular:  · The apical impulses not displaced  · Heart tones are crisp and normal, no murmur/rub/gallop  · Reg rate and rhythm, S1,S2 normal  · Radial pulses 2+ and equal bilaterally  · No edema  · Pedal Pulses: 2+ and equal   Abdomen:  · No masses or tenderness  · Liver: No Abnormalities Noted  Musculoskeletal/Skin:  · Gait is slow  · There is no clubbing, cyanosis of the extremities  · Skin is warm and dry  · Moves all extremities well  Neurological/Psychiatric:  · Alert and oriented in all spheres  · No abnormalities of mood, affect, memory, mentation, or behavior are noted    Lab Data:  CBC:   Lab Results   Component Value Date    WBC 9.3 03/28/2018    WBC 17.3 03/27/2018    WBC 26.4 03/26/2018    RBC 4.21 03/28/2018    RBC 4.18 03/27/2018    RBC 4.79 03/26/2018    HGB 11.5 03/28/2018    HGB 11.4 03/27/2018    HGB 13.0 03/26/2018    HCT 34.6 03/28/2018    HCT 34.4 03/27/2018    HCT 39.4 03/26/2018    MCV 82.2 03/28/2018    MCV 82.4 03/27/2018    MCV 82.3 03/26/2018    RDW 13.2 03/28/2018    RDW 13.5 03/27/2018    RDW 13.3 03/26/2018     03/28/2018     03/27/2018     03/26/2018     BMP:   Lab Results   Component Value Date     08/24/2018     03/28/2018     03/27/2018    K 4.4 08/24/2018    K 4.5 03/28/2018    K 4.0 03/27/2018    CL 95 08/24/2018     03/28/2018     03/27/2018    CO2 25 08/24/2018    CO2 28

## 2018-11-20 ENCOUNTER — OFFICE VISIT (OUTPATIENT)
Dept: CARDIOLOGY CLINIC | Age: 73
End: 2018-11-20
Payer: MEDICARE

## 2018-11-20 ENCOUNTER — PROCEDURE VISIT (OUTPATIENT)
Dept: CARDIOLOGY CLINIC | Age: 73
End: 2018-11-20
Payer: MEDICARE

## 2018-11-20 VITALS
SYSTOLIC BLOOD PRESSURE: 130 MMHG | HEART RATE: 67 BPM | OXYGEN SATURATION: 97 % | HEIGHT: 65 IN | DIASTOLIC BLOOD PRESSURE: 62 MMHG | WEIGHT: 141.6 LBS | BODY MASS INDEX: 23.59 KG/M2

## 2018-11-20 DIAGNOSIS — Z95.810 CARDIAC RESYNCHRONIZATION THERAPY DEFIBRILLATOR (CRT-D) IN PLACE: ICD-10-CM

## 2018-11-20 DIAGNOSIS — I42.0 DILATED CARDIOMYOPATHY (HCC): ICD-10-CM

## 2018-11-20 DIAGNOSIS — I50.42 CHRONIC COMBINED SYSTOLIC AND DIASTOLIC CONGESTIVE HEART FAILURE (HCC): Primary | ICD-10-CM

## 2018-11-20 DIAGNOSIS — Z95.1 S/P CABG X 3: ICD-10-CM

## 2018-11-20 DIAGNOSIS — I50.42 CHRONIC COMBINED SYSTOLIC AND DIASTOLIC CONGESTIVE HEART FAILURE (HCC): ICD-10-CM

## 2018-11-20 PROCEDURE — 1036F TOBACCO NON-USER: CPT | Performed by: NURSE PRACTITIONER

## 2018-11-20 PROCEDURE — 93284 PRGRMG EVAL IMPLANTABLE DFB: CPT | Performed by: INTERNAL MEDICINE

## 2018-11-20 PROCEDURE — G8427 DOCREV CUR MEDS BY ELIG CLIN: HCPCS | Performed by: NURSE PRACTITIONER

## 2018-11-20 PROCEDURE — G8484 FLU IMMUNIZE NO ADMIN: HCPCS | Performed by: NURSE PRACTITIONER

## 2018-11-20 PROCEDURE — 99214 OFFICE O/P EST MOD 30 MIN: CPT | Performed by: NURSE PRACTITIONER

## 2018-11-20 PROCEDURE — 4040F PNEUMOC VAC/ADMIN/RCVD: CPT | Performed by: NURSE PRACTITIONER

## 2018-11-20 PROCEDURE — G8420 CALC BMI NORM PARAMETERS: HCPCS | Performed by: NURSE PRACTITIONER

## 2018-11-20 PROCEDURE — 93290 INTERROG DEV EVAL ICPMS IP: CPT | Performed by: INTERNAL MEDICINE

## 2018-11-20 PROCEDURE — 3017F COLORECTAL CA SCREEN DOC REV: CPT | Performed by: NURSE PRACTITIONER

## 2018-11-20 PROCEDURE — 1123F ACP DISCUSS/DSCN MKR DOCD: CPT | Performed by: NURSE PRACTITIONER

## 2018-11-20 PROCEDURE — G8598 ASA/ANTIPLAT THER USED: HCPCS | Performed by: NURSE PRACTITIONER

## 2018-11-20 PROCEDURE — 1101F PT FALLS ASSESS-DOCD LE1/YR: CPT | Performed by: NURSE PRACTITIONER

## 2018-11-20 NOTE — LETTER
HGB 13.0 03/26/2018    HCT 34.6 03/28/2018    HCT 34.4 03/27/2018    HCT 39.4 03/26/2018    MCV 82.2 03/28/2018    MCV 82.4 03/27/2018    MCV 82.3 03/26/2018    RDW 13.2 03/28/2018    RDW 13.5 03/27/2018    RDW 13.3 03/26/2018     03/28/2018     03/27/2018     03/26/2018     BMP:   Lab Results   Component Value Date     08/24/2018     03/28/2018     03/27/2018    K 4.4 08/24/2018    K 4.5 03/28/2018    K 4.0 03/27/2018    CL 95 08/24/2018     03/28/2018     03/27/2018    CO2 25 08/24/2018    CO2 28 03/28/2018    CO2 26 03/27/2018    PHOS 3.1 03/28/2018    PHOS 2.0 03/27/2018    PHOS 3.0 06/28/2017    BUN 10 08/24/2018    BUN 14 03/28/2018    BUN 15 03/27/2018    CREATININE 1.4 08/24/2018    CREATININE 1.1 03/28/2018    CREATININE 1.1 03/27/2018     BNP:   Lab Results   Component Value Date    PROBNP 1,631 03/26/2018       Recent Testing:   MUGA scan on 9/28/17 showed an EF of 20%. STRESS TEST: 8/9/2016     Summary  Small-moderate sized apical, inferoapical, and inferoseptal fixed defects  consistent with infarction in the territory of the mid LAD and/or LCx. There  is severe global LV systolic dysfunction with ejection fraction of 23%. There is asynchronous septal motion which may be due to LBBB and severe HK  of the apex. Higher risk abnormal study. CATH: 09/13/16  ANGIOGRAPHIC FINDINGS:   1. Multivessel critical coronary artery disease with ostial LAD and critical  circ and right coronary artery disease. 2. Reduced left ventricular function with an EF of 20% and global left  ventricular function. 3. Normal left and right heart hemodynamics. BYPASS: 09/16/16  Urgent CABG X 3, with pedicled LIMA to LAD, sequential Greater Saphenous VG to OM 2 then on to PV br of RCA, SGC, CPB, EVH Right Greater Saphenous vein, MOUNIKA, Epiaortic ultrasound, Doppler verification of grafts, Bilateral 5 level intercostal nerve block(Exparel), Platelet gel application.

## 2018-11-21 ENCOUNTER — HOSPITAL ENCOUNTER (OUTPATIENT)
Age: 73
Discharge: HOME | End: 2018-11-21
Payer: MEDICARE

## 2018-11-21 DIAGNOSIS — Z95.1 S/P CABG X 3: ICD-10-CM

## 2018-11-21 DIAGNOSIS — I42.0 DILATED CARDIOMYOPATHY (HCC): ICD-10-CM

## 2018-11-21 DIAGNOSIS — Z95.810 CARDIAC RESYNCHRONIZATION THERAPY DEFIBRILLATOR (CRT-D) IN PLACE: ICD-10-CM

## 2018-11-21 DIAGNOSIS — I50.42 CHRONIC COMBINED SYSTOLIC AND DIASTOLIC CONGESTIVE HEART FAILURE (HCC): ICD-10-CM

## 2018-11-21 LAB
ANION GAP SERPL CALCULATED.3IONS-SCNC: 13 MMOL/L (ref 3–16)
BUN BLDV-MCNC: 9 MG/DL (ref 7–20)
CALCIUM SERPL-MCNC: 9.3 MG/DL (ref 8.3–10.6)
CHLORIDE BLD-SCNC: 99 MMOL/L (ref 99–110)
CHOLESTEROL, FASTING: 159 MG/DL (ref 0–199)
CO2: 26 MMOL/L (ref 21–32)
CREAT SERPL-MCNC: 1.2 MG/DL (ref 0.8–1.3)
GFR AFRICAN AMERICAN: >60
GFR NON-AFRICAN AMERICAN: 59
GLUCOSE BLD-MCNC: 240 MG/DL (ref 70–99)
HDLC SERPL-MCNC: 25 MG/DL (ref 40–60)
LDL CHOLESTEROL CALCULATED: ABNORMAL MG/DL
LDL CHOLESTEROL DIRECT: 105 MG/DL
POTASSIUM SERPL-SCNC: 3.9 MMOL/L (ref 3.5–5.1)
SODIUM BLD-SCNC: 138 MMOL/L (ref 136–145)
TRIGLYCERIDE, FASTING: 432 MG/DL (ref 0–150)
VLDLC SERPL CALC-MCNC: ABNORMAL MG/DL

## 2018-11-21 PROCEDURE — 80048 BASIC METABOLIC PNL TOTAL CA: CPT

## 2018-11-21 PROCEDURE — 83721 ASSAY OF BLOOD LIPOPROTEIN: CPT

## 2018-11-21 PROCEDURE — 36415 COLL VENOUS BLD VENIPUNCTURE: CPT

## 2018-11-21 PROCEDURE — 80061 LIPID PANEL: CPT

## 2018-11-27 ENCOUNTER — NURSE ONLY (OUTPATIENT)
Dept: CARDIOLOGY CLINIC | Age: 73
End: 2018-11-27
Payer: MEDICARE

## 2018-11-27 ENCOUNTER — TELEPHONE (OUTPATIENT)
Dept: CARDIOLOGY | Age: 73
End: 2018-11-27

## 2018-11-27 DIAGNOSIS — I42.0 DILATED CARDIOMYOPATHY (HCC): ICD-10-CM

## 2018-11-27 DIAGNOSIS — I50.42 CHRONIC COMBINED SYSTOLIC AND DIASTOLIC CONGESTIVE HEART FAILURE (HCC): ICD-10-CM

## 2018-11-27 DIAGNOSIS — Z95.810 CARDIAC RESYNCHRONIZATION THERAPY DEFIBRILLATOR (CRT-D) IN PLACE: Primary | ICD-10-CM

## 2018-11-27 DIAGNOSIS — I50.22 CHRONIC SYSTOLIC CONGESTIVE HEART FAILURE (HCC): Primary | ICD-10-CM

## 2018-11-27 PROCEDURE — 93295 DEV INTERROG REMOTE 1/2/MLT: CPT | Performed by: INTERNAL MEDICINE

## 2018-11-27 PROCEDURE — 93296 REM INTERROG EVL PM/IDS: CPT | Performed by: INTERNAL MEDICINE

## 2018-11-27 PROCEDURE — 93297 REM INTERROG DEV EVAL ICPMS: CPT | Performed by: INTERNAL MEDICINE

## 2018-11-27 RX ORDER — LISINOPRIL 5 MG/1
5 TABLET ORAL DAILY
Qty: 90 TABLET | Refills: 1 | Status: SHIPPED | OUTPATIENT
Start: 2018-11-27 | End: 2018-11-27 | Stop reason: SDUPTHER

## 2018-11-27 RX ORDER — LISINOPRIL 5 MG/1
5 TABLET ORAL DAILY
Qty: 90 TABLET | Refills: 1 | Status: SHIPPED | OUTPATIENT
Start: 2018-11-27 | End: 2019-04-05 | Stop reason: SDUPTHER

## 2018-11-30 NOTE — PROGRESS NOTES
Carelink transmission shows normal functioning device. 98.2% Vpaced. Optivol elevated. See PACEART report under Cardiology tab.

## 2019-01-31 ENCOUNTER — PROCEDURE VISIT (OUTPATIENT)
Dept: CARDIOLOGY CLINIC | Age: 74
End: 2019-01-31

## 2019-01-31 DIAGNOSIS — Z95.810 CARDIAC RESYNCHRONIZATION THERAPY DEFIBRILLATOR (CRT-D) IN PLACE: ICD-10-CM

## 2019-02-26 ENCOUNTER — NURSE ONLY (OUTPATIENT)
Dept: CARDIOLOGY CLINIC | Age: 74
End: 2019-02-26
Payer: MEDICARE

## 2019-02-26 DIAGNOSIS — I42.0 DILATED CARDIOMYOPATHY (HCC): ICD-10-CM

## 2019-02-26 DIAGNOSIS — I50.42 CHRONIC COMBINED SYSTOLIC AND DIASTOLIC CONGESTIVE HEART FAILURE (HCC): ICD-10-CM

## 2019-02-26 DIAGNOSIS — Z95.810 CARDIAC RESYNCHRONIZATION THERAPY DEFIBRILLATOR (CRT-D) IN PLACE: Primary | ICD-10-CM

## 2019-02-26 PROCEDURE — 93295 DEV INTERROG REMOTE 1/2/MLT: CPT | Performed by: INTERNAL MEDICINE

## 2019-02-26 PROCEDURE — 93297 REM INTERROG DEV EVAL ICPMS: CPT | Performed by: INTERNAL MEDICINE

## 2019-02-26 PROCEDURE — 93296 REM INTERROG EVL PM/IDS: CPT | Performed by: INTERNAL MEDICINE

## 2019-04-05 ENCOUNTER — OFFICE VISIT (OUTPATIENT)
Dept: INTERNAL MEDICINE CLINIC | Age: 74
End: 2019-04-05

## 2019-04-05 VITALS
WEIGHT: 144 LBS | HEIGHT: 65 IN | BODY MASS INDEX: 23.99 KG/M2 | SYSTOLIC BLOOD PRESSURE: 135 MMHG | DIASTOLIC BLOOD PRESSURE: 75 MMHG | HEART RATE: 60 BPM | RESPIRATION RATE: 18 BRPM

## 2019-04-05 DIAGNOSIS — I10 ESSENTIAL HYPERTENSION: Primary | Chronic | ICD-10-CM

## 2019-04-05 DIAGNOSIS — E44.1 MILD MALNUTRITION (HCC): ICD-10-CM

## 2019-04-05 DIAGNOSIS — I50.42 CHRONIC COMBINED SYSTOLIC AND DIASTOLIC CONGESTIVE HEART FAILURE (HCC): ICD-10-CM

## 2019-04-05 DIAGNOSIS — Z95.1 S/P CABG X 3: ICD-10-CM

## 2019-04-05 DIAGNOSIS — G45.9 TIA (TRANSIENT ISCHEMIC ATTACK): ICD-10-CM

## 2019-04-05 DIAGNOSIS — I25.10 CORONARY ARTERY DISEASE INVOLVING NATIVE CORONARY ARTERY OF NATIVE HEART WITHOUT ANGINA PECTORIS: ICD-10-CM

## 2019-04-05 DIAGNOSIS — I70.209 DIABETES MELLITUS TYPE 2 WITH ATHEROSCLEROSIS OF ARTERIES OF EXTREMITIES (HCC): ICD-10-CM

## 2019-04-05 DIAGNOSIS — K22.2 ESOPHAGEAL STRICTURE: Chronic | ICD-10-CM

## 2019-04-05 DIAGNOSIS — I50.42 CHRONIC COMBINED SYSTOLIC AND DIASTOLIC CONGESTIVE HEART FAILURE (HCC): Chronic | ICD-10-CM

## 2019-04-05 DIAGNOSIS — Z95.810 CARDIAC RESYNCHRONIZATION THERAPY DEFIBRILLATOR (CRT-D) IN PLACE: ICD-10-CM

## 2019-04-05 DIAGNOSIS — I42.0 DILATED CARDIOMYOPATHY (HCC): ICD-10-CM

## 2019-04-05 DIAGNOSIS — E11.51 DIABETES MELLITUS TYPE 2 WITH ATHEROSCLEROSIS OF ARTERIES OF EXTREMITIES (HCC): ICD-10-CM

## 2019-04-05 DIAGNOSIS — E78.2 MIXED HYPERLIPIDEMIA: Chronic | ICD-10-CM

## 2019-04-05 PROBLEM — R73.9 ACUTE HYPERGLYCEMIA: Status: RESOLVED | Noted: 2017-06-24 | Resolved: 2019-04-05

## 2019-04-05 PROBLEM — G47.33 OSA (OBSTRUCTIVE SLEEP APNEA): Status: RESOLVED | Noted: 2017-07-26 | Resolved: 2019-04-05

## 2019-04-05 PROBLEM — I16.0 HYPERTENSIVE URGENCY: Status: RESOLVED | Noted: 2017-06-24 | Resolved: 2019-04-05

## 2019-04-05 PROCEDURE — 99214 OFFICE O/P EST MOD 30 MIN: CPT | Performed by: INTERNAL MEDICINE

## 2019-04-05 RX ORDER — METOPROLOL SUCCINATE 25 MG/1
25 TABLET, EXTENDED RELEASE ORAL NIGHTLY
Qty: 30 TABLET | Refills: 11 | Status: SHIPPED | OUTPATIENT
Start: 2019-04-05

## 2019-04-05 RX ORDER — ATORVASTATIN CALCIUM 40 MG/1
40 TABLET, FILM COATED ORAL NIGHTLY
Qty: 90 TABLET | Refills: 3 | Status: SHIPPED | OUTPATIENT
Start: 2019-04-05

## 2019-04-05 RX ORDER — FINASTERIDE 5 MG/1
5 TABLET, FILM COATED ORAL DAILY
Qty: 90 TABLET | Refills: 0 | Status: SHIPPED | OUTPATIENT
Start: 2019-04-05 | End: 2022-02-14 | Stop reason: SDUPTHER

## 2019-04-05 RX ORDER — CLOPIDOGREL BISULFATE 75 MG/1
75 TABLET ORAL DAILY
Qty: 90 TABLET | Refills: 0 | Status: SHIPPED | OUTPATIENT
Start: 2019-04-05

## 2019-04-05 RX ORDER — GLIPIZIDE 5 MG/1
5 TABLET ORAL
Qty: 180 TABLET | Refills: 0 | Status: SHIPPED | OUTPATIENT
Start: 2019-04-05 | End: 2021-03-18 | Stop reason: SDUPTHER

## 2019-04-05 RX ORDER — LISINOPRIL 5 MG/1
5 TABLET ORAL DAILY
Qty: 90 TABLET | Refills: 1 | Status: SHIPPED | OUTPATIENT
Start: 2019-04-05 | End: 2019-12-19 | Stop reason: SDUPTHER

## 2019-04-05 RX ORDER — DIGOXIN 125 MCG
125 TABLET ORAL DAILY
Qty: 90 TABLET | Refills: 1 | Status: SHIPPED | OUTPATIENT
Start: 2019-04-05 | End: 2020-01-06 | Stop reason: DRUGHIGH

## 2019-04-05 RX ORDER — PANTOPRAZOLE SODIUM 40 MG/1
40 TABLET, DELAYED RELEASE ORAL DAILY
Qty: 90 TABLET | Refills: 1 | Status: SHIPPED | OUTPATIENT
Start: 2019-04-05

## 2019-04-05 ASSESSMENT — PATIENT HEALTH QUESTIONNAIRE - PHQ9
2. FEELING DOWN, DEPRESSED OR HOPELESS: 0
SUM OF ALL RESPONSES TO PHQ QUESTIONS 1-9: 0
SUM OF ALL RESPONSES TO PHQ QUESTIONS 1-9: 0
SUM OF ALL RESPONSES TO PHQ9 QUESTIONS 1 & 2: 0
1. LITTLE INTEREST OR PLEASURE IN DOING THINGS: 0

## 2019-04-05 NOTE — PROGRESS NOTES
Subjective:      Patient ID: Mathieu Rowan is a 75 y.o. male. HPI     75 y.o. male with hx of  DM - 2,  Ischemic cardiomyopathy, s.p CABG, AICD , HTn, CVA , prostate enlargement here for regular f/ w    Lost f/w for over a year as he went to [place] and now changing    Known CAD with low EF, urgent CABG in 2016  And did well. Later EF remains low , had AICD placed in 2018          [facility]- 6/17 for right MCA stroke ( mulitple ) with left UE , left facial weakness and dysphagia with slurred speech     Workup with MRI confirmed right MCA multiple strokes. No Afibb noted in TELE. ECHO with no thrombus  Added plavix to asa , changed to dysphagia diet   No issues over a year     Pt was very non compliant with meds before and now reports taking them regularly        .     Daughter today reports that metformin is very difficult to swallow due to size . Denies any chest pain, occasional sob with activity   No pedal edema        Current Outpatient Medications   Medication Sig Dispense Refill    lisinopril (PRINIVIL;ZESTRIL) 5 MG tablet Take 1 tablet by mouth daily 90 tablet 1    finasteride (PROSCAR) 5 MG tablet Take 1 tablet by mouth daily 90 tablet 0    atorvastatin (LIPITOR) 40 MG tablet Take 1 tablet by mouth nightly 90 tablet 3    digoxin (LANOXIN) 125 MCG tablet Take 1 tablet by mouth daily 90 tablet 1    metoprolol succinate (TOPROL XL) 25 MG extended release tablet Take 1 tablet by mouth nightly 30 tablet 11    pantoprazole (PROTONIX) 40 MG tablet Take 1 tablet by mouth daily 90 tablet 1    glipiZIDE (GLUCOTROL) 5 MG tablet Take 1 tablet by mouth 2 times daily (before meals) 180 tablet 0    clopidogrel (PLAVIX) 75 MG tablet Take 1 tablet by mouth daily 90 tablet 0    aspirin 81 MG tablet Take 81 mg by mouth daily        No current facility-administered medications for this visit. Review of Systems   Constitutional: Negative for activity change, fatigue and unexpected weight change.    HENT: Negative for ear discharge, hearing loss, postnasal drip and rhinorrhea. Respiratory: Negative for chest tightness and shortness of breath. Cardiovascular: Negative for chest pain and palpitations. Endocrine: Positive for polyuria. Genitourinary: Negative for frequency and hematuria. Musculoskeletal: Negative for neck stiffness. Skin: Negative for color change. Allergic/Immunologic: Negative for immunocompromised state. Neurological: Negative for weakness. Hematological: Negative for adenopathy. Objective:   Physical Exam     Vitals:    04/05/19 1335   BP: 135/75   Pulse: 60   Resp: 18       General:  eldelry male,  Thin built  Awake, alert and oriented. Appears to be not in any distress  Mucous Membranes:  Pink , anicteric  Neck: No JVD, no carotid bruit, no thyromegaly  Chest:  Clear to auscultation bilaterally, no added sounds  Cardiovascular:  RRR S1S2 heard, no murmurs or gallops  Abdomen:  Soft, undistended, non tender, no organomegaly, BS present  Extremities: No edema or cyanosis. Distal pulses well felt  Neurological : grossly normal    MUGA      Abnormal resting left ventricular function with inferior, inferolateral, and    septal wall hypokinesis and EF= 20% .            MRI   Multiple small acute infarcts in right MCA territory.       Slow flow versus occlusion suggested in right middle cerebral artery M3   division.       Remote cerebral and cerebellar infarcts.          Carotid doppler          1.  There is moderate plaque seen in the right internal carotid artery with    an estimated diameter reduction of <50%.    2. There is minimal plaque seen in the left internal carotid artery with an    estimated diameter reduction of <50%.    3. The vertebral arteries are patent with antegrade flow bilaterally.          Swallow eval  Aspiration observed with thin barium and nectar.       Marked anterior endplate spurring at N8-6 likely impairs swallowing.         ECHO 2018      Summary   The left ventricular systolic function is moderately reduced with an   ejection fraction of 40%.  Mild concentric left ventricular hypertrophy.   The apex and apical segments appear hypokinetic.   Left ventricular cavity size is normal.   Grade I diastolic dysfunction with normal filing pressure.   Mild mitral regurgitation.   Systolic pulmonary artery pressure (SPAP) is normal and estimated at 27 mmHg   (RA pressure 3 mmHg).   Pacer / ICD wire is visualized in the right ventricle.   Last echo on 6/28/2017 showed EF 30-35%. Assessment:       Diagnosis Orders   1. Essential hypertension  CBC WITH AUTO DIFFERENTIAL    COMPREHENSIVE METABOLIC PANEL   2. Dilated cardiomyopathy (HCC)  Lipid, Fasting    Digoxin level   3. Chronic combined systolic and diastolic congestive heart failure ([address]     4. Mixed hyperlipidemia     5. Esophageal stricture s/p dilatation (June 2017)     6. CAD s/p CABGx3 (2016)     7. TIA (transient ischemic attack)     8. Cardiac resynchronization therapy defibrillator (CRT-D) in place     9. Chronic combined systolic (EF 52%) & diastolic (grade 1 LVDD) CHF  finasteride (PROSCAR) 5 MG tablet   10. Coronary artery disease involving native coronary artery of native heart without angina pectoris  atorvastatin (LIPITOR) 40 MG tablet   11. 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 Mild malnutrition ([address]                 Plan:         DM- 2 - stopped  Metformin as he did not tolerate with abd cramps    Now on glipizide 5 mg daily  Advice low carb diet. stop soft drinks Continue same meds  monitor once daily   Recent labs at [place] reviewed - A1 c at 8  Need repeat labs      CAD - multivessel - s/p urgent CABG in 9/16      Continue Toprol xl  -25 mg in am  conitnue   ACEI and digoxin  Resume statins      Cardiomyopathy - ischemic   well compensated. continue BB, ACEI   No reason for lasix. Improved to 40 % on recent ECHO . no no     The patient does not have a history of falls. I did , complete a risk assessment for falls. A plan of care for falls No Treatment plan indicated       Lab Results   Component Value Date    LDLCALC see below 11/21/2018    LDLDIRECT 105 (H) 11/21/2018    (goal LDL reduction with dx if diabetes is 50% LDL reduction)    PHQ Scores 4/5/2019 10/11/2017 7/28/2016   PHQ2 Score 0 0 0   PHQ9 Score 0 0 0     Interpretation of Total Score Depression Severity: 1-4 = Minimal depression, 5-9 = Mild depression, 10-14 = Moderate depression, 15-19 = Moderately severe depression, 20-27 = Severe depression        Chronic Disease Visit Information    BP Readings from Last 3 Encounters:   04/05/19 135/75   11/20/18 130/62   06/27/18 118/62          Hemoglobin A1C (%)   Date Value   03/27/2018 7.2   06/24/2017 8.2   02/21/2017 10.8     Microscopic Examination (no units)   Date Value   03/26/2018 YES     LDL Calculated (mg/dL)   Date Value   11/21/2018 see below     HDL (mg/dL)   Date Value   11/21/2018 25 (L)     BUN (mg/dL)   Date Value   11/21/2018 9     CREATININE (mg/dL)   Date Value   11/21/2018 1.2     Glucose (mg/dL)   Date Value   11/21/2018 240 (H)          no  Have you changed or started any medications since your last visit including any over-the-counter medicines, vitamins, or herbal medicines? Are you having any side effects from any of your medications? -  no  Have you stopped taking any of your medications? Is so, why? -  no    Have you seen any other physician or provider since your last visit? Yes - Records Obtained records in EPIC  Have you had any other diagnostic tests since your last visit? No  Have you been seen in the emergency room and/or had an admission to a hospital since we last saw you? No  Have you had your annual diabetic retinal (eye) exam? No  Have you had your routine dental cleaning in the past 6 months? no    Have you activated your [email or web address] account? If not, what are your barriers?  Yes

## 2019-04-22 ENCOUNTER — HOSPITAL ENCOUNTER (OUTPATIENT)
Age: 74
Discharge: HOME | End: 2019-04-22
Payer: MEDICARE

## 2019-04-22 DIAGNOSIS — I10 ESSENTIAL HYPERTENSION: Chronic | ICD-10-CM

## 2019-04-22 DIAGNOSIS — I42.0 DILATED CARDIOMYOPATHY (HCC): ICD-10-CM

## 2019-04-22 DIAGNOSIS — E11.51 DIABETES MELLITUS TYPE 2 WITH ATHEROSCLEROSIS OF ARTERIES OF EXTREMITIES (HCC): ICD-10-CM

## 2019-04-22 DIAGNOSIS — I70.209 DIABETES MELLITUS TYPE 2 WITH ATHEROSCLEROSIS OF ARTERIES OF EXTREMITIES (HCC): ICD-10-CM

## 2019-04-22 LAB
A/G RATIO: 1.1 (ref 1.1–2.2)
ALBUMIN SERPL-MCNC: 4.3 G/DL (ref 3.4–5)
ALP BLD-CCNC: 70 U/L (ref 40–129)
ALT SERPL-CCNC: 24 U/L (ref 10–40)
ANION GAP SERPL CALCULATED.3IONS-SCNC: 14 MMOL/L (ref 3–16)
AST SERPL-CCNC: 28 U/L (ref 15–37)
BASOPHILS ABSOLUTE: 0.1 K/UL (ref 0–0.2)
BASOPHILS RELATIVE PERCENT: 1.1 %
BILIRUB SERPL-MCNC: 0.6 MG/DL (ref 0–1)
BUN BLDV-MCNC: 9 MG/DL (ref 7–20)
CALCIUM SERPL-MCNC: 9.9 MG/DL (ref 8.3–10.6)
CHLORIDE BLD-SCNC: 99 MMOL/L (ref 99–110)
CHOLESTEROL, FASTING: 160 MG/DL (ref 0–199)
CO2: 26 MMOL/L (ref 21–32)
CREAT SERPL-MCNC: 1.2 MG/DL (ref 0.8–1.3)
EOSINOPHILS ABSOLUTE: 0.5 K/UL (ref 0–0.6)
EOSINOPHILS RELATIVE PERCENT: 4.7 %
GFR AFRICAN AMERICAN: >60
GFR NON-AFRICAN AMERICAN: 59
GLOBULIN: 3.8 G/DL
GLUCOSE BLD-MCNC: 257 MG/DL (ref 70–99)
HCT VFR BLD CALC: 43 % (ref 40.5–52.5)
HDLC SERPL-MCNC: 26 MG/DL (ref 40–60)
HEMOGLOBIN: 14.6 G/DL (ref 13.5–17.5)
LDL CHOLESTEROL CALCULATED: ABNORMAL MG/DL
LDL CHOLESTEROL DIRECT: 102 MG/DL
LYMPHOCYTES ABSOLUTE: 1.9 K/UL (ref 1–5.1)
LYMPHOCYTES RELATIVE PERCENT: 18.1 %
MCH RBC QN AUTO: 28.8 PG (ref 26–34)
MCHC RBC AUTO-ENTMCNC: 34 G/DL (ref 31–36)
MCV RBC AUTO: 84.8 FL (ref 80–100)
MONOCYTES ABSOLUTE: 0.7 K/UL (ref 0–1.3)
MONOCYTES RELATIVE PERCENT: 6.8 %
NEUTROPHILS ABSOLUTE: 7.2 K/UL (ref 1.7–7.7)
NEUTROPHILS RELATIVE PERCENT: 69.3 %
PDW BLD-RTO: 13.8 % (ref 12.4–15.4)
PLATELET # BLD: 253 K/UL (ref 135–450)
PMV BLD AUTO: 8.5 FL (ref 5–10.5)
POTASSIUM SERPL-SCNC: 5.2 MMOL/L (ref 3.5–5.1)
RBC # BLD: 5.07 M/UL (ref 4.2–5.9)
SODIUM BLD-SCNC: 139 MMOL/L (ref 136–145)
TOTAL PROTEIN: 8.1 G/DL (ref 6.4–8.2)
TRIGLYCERIDE, FASTING: 304 MG/DL (ref 0–150)
VLDLC SERPL CALC-MCNC: ABNORMAL MG/DL
WBC # BLD: 10.4 K/UL (ref 4–11)

## 2019-04-22 PROCEDURE — 85025 COMPLETE CBC W/AUTO DIFF WBC: CPT

## 2019-04-22 PROCEDURE — 80053 COMPREHEN METABOLIC PANEL: CPT

## 2019-04-22 PROCEDURE — 36415 COLL VENOUS BLD VENIPUNCTURE: CPT

## 2019-04-22 PROCEDURE — 80061 LIPID PANEL: CPT

## 2019-04-22 PROCEDURE — 83036 HEMOGLOBIN GLYCOSYLATED A1C: CPT

## 2019-04-23 LAB
ESTIMATED AVERAGE GLUCOSE: 203 MG/DL
HBA1C MFR BLD: 8.7 %

## 2019-05-06 ENCOUNTER — TELEPHONE (OUTPATIENT)
Dept: INTERNAL MEDICINE CLINIC | Age: 74
End: 2019-05-06

## 2019-05-06 NOTE — TELEPHONE ENCOUNTER
----- Message from Wali Pinzon sent at 5/6/2019  9:24 AM EDT -----  Contact: pt's daughter, [employer], [phone]  Pt's daughter, [employer], called and said the [address] [facility] is requesting for pt's most recent lab results be faxed to them. Fax: [phone].

## 2019-05-07 ENCOUNTER — PROCEDURE VISIT (OUTPATIENT)
Dept: CARDIOLOGY CLINIC | Age: 74
End: 2019-05-07
Payer: MEDICARE

## 2019-05-07 ENCOUNTER — OFFICE VISIT (OUTPATIENT)
Dept: CARDIOLOGY CLINIC | Age: 74
End: 2019-05-07
Payer: MEDICARE

## 2019-05-07 VITALS
WEIGHT: 144 LBS | DIASTOLIC BLOOD PRESSURE: 54 MMHG | BODY MASS INDEX: 23.99 KG/M2 | HEIGHT: 65 IN | HEART RATE: 70 BPM | OXYGEN SATURATION: 98 % | SYSTOLIC BLOOD PRESSURE: 120 MMHG

## 2019-05-07 VITALS
HEIGHT: 65 IN | OXYGEN SATURATION: 98 % | SYSTOLIC BLOOD PRESSURE: 120 MMHG | HEART RATE: 65 BPM | DIASTOLIC BLOOD PRESSURE: 54 MMHG | BODY MASS INDEX: 23.99 KG/M2 | WEIGHT: 144 LBS

## 2019-05-07 DIAGNOSIS — I50.42 CHRONIC COMBINED SYSTOLIC AND DIASTOLIC CONGESTIVE HEART FAILURE (HCC): ICD-10-CM

## 2019-05-07 DIAGNOSIS — I70.209 DIABETES MELLITUS TYPE 2 WITH ATHEROSCLEROSIS OF ARTERIES OF EXTREMITIES (HCC): Chronic | ICD-10-CM

## 2019-05-07 DIAGNOSIS — Z95.810 CARDIAC RESYNCHRONIZATION THERAPY DEFIBRILLATOR (CRT-D) IN PLACE: ICD-10-CM

## 2019-05-07 DIAGNOSIS — I10 ESSENTIAL HYPERTENSION: Chronic | ICD-10-CM

## 2019-05-07 DIAGNOSIS — I42.0 DILATED CARDIOMYOPATHY (HCC): Primary | ICD-10-CM

## 2019-05-07 DIAGNOSIS — I42.0 DILATED CARDIOMYOPATHY (HCC): ICD-10-CM

## 2019-05-07 DIAGNOSIS — K90.49 MALABSORPTION DUE TO INTOLERANCE, NOT ELSEWHERE CLASSIFIED: ICD-10-CM

## 2019-05-07 DIAGNOSIS — E11.51 DIABETES MELLITUS TYPE 2 WITH ATHEROSCLEROSIS OF ARTERIES OF EXTREMITIES (HCC): Chronic | ICD-10-CM

## 2019-05-07 DIAGNOSIS — I50.42 CHRONIC COMBINED SYSTOLIC AND DIASTOLIC CONGESTIVE HEART FAILURE (HCC): Primary | ICD-10-CM

## 2019-05-07 PROCEDURE — 4040F PNEUMOC VAC/ADMIN/RCVD: CPT | Performed by: INTERNAL MEDICINE

## 2019-05-07 PROCEDURE — G8420 CALC BMI NORM PARAMETERS: HCPCS | Performed by: NURSE PRACTITIONER

## 2019-05-07 PROCEDURE — 93000 ELECTROCARDIOGRAM COMPLETE: CPT | Performed by: INTERNAL MEDICINE

## 2019-05-07 PROCEDURE — 93284 PRGRMG EVAL IMPLANTABLE DFB: CPT | Performed by: INTERNAL MEDICINE

## 2019-05-07 PROCEDURE — 1123F ACP DISCUSS/DSCN MKR DOCD: CPT | Performed by: NURSE PRACTITIONER

## 2019-05-07 PROCEDURE — G8598 ASA/ANTIPLAT THER USED: HCPCS | Performed by: NURSE PRACTITIONER

## 2019-05-07 PROCEDURE — G8427 DOCREV CUR MEDS BY ELIG CLIN: HCPCS | Performed by: INTERNAL MEDICINE

## 2019-05-07 PROCEDURE — 93290 INTERROG DEV EVAL ICPMS IP: CPT | Performed by: NURSE PRACTITIONER

## 2019-05-07 PROCEDURE — 99214 OFFICE O/P EST MOD 30 MIN: CPT | Performed by: NURSE PRACTITIONER

## 2019-05-07 PROCEDURE — 1036F TOBACCO NON-USER: CPT | Performed by: INTERNAL MEDICINE

## 2019-05-07 PROCEDURE — 1123F ACP DISCUSS/DSCN MKR DOCD: CPT | Performed by: INTERNAL MEDICINE

## 2019-05-07 PROCEDURE — 4040F PNEUMOC VAC/ADMIN/RCVD: CPT | Performed by: NURSE PRACTITIONER

## 2019-05-07 PROCEDURE — 3017F COLORECTAL CA SCREEN DOC REV: CPT | Performed by: INTERNAL MEDICINE

## 2019-05-07 PROCEDURE — G8420 CALC BMI NORM PARAMETERS: HCPCS | Performed by: INTERNAL MEDICINE

## 2019-05-07 PROCEDURE — 1036F TOBACCO NON-USER: CPT | Performed by: NURSE PRACTITIONER

## 2019-05-07 PROCEDURE — 2022F DILAT RTA XM EVC RTNOPTHY: CPT | Performed by: NURSE PRACTITIONER

## 2019-05-07 PROCEDURE — 99214 OFFICE O/P EST MOD 30 MIN: CPT | Performed by: INTERNAL MEDICINE

## 2019-05-07 PROCEDURE — G8427 DOCREV CUR MEDS BY ELIG CLIN: HCPCS | Performed by: NURSE PRACTITIONER

## 2019-05-07 PROCEDURE — 3017F COLORECTAL CA SCREEN DOC REV: CPT | Performed by: NURSE PRACTITIONER

## 2019-05-07 PROCEDURE — G8598 ASA/ANTIPLAT THER USED: HCPCS | Performed by: INTERNAL MEDICINE

## 2019-05-07 PROCEDURE — 3045F PR MOST RECENT HEMOGLOBIN A1C LEVEL 7.0-9.0%: CPT | Performed by: NURSE PRACTITIONER

## 2019-05-07 NOTE — LETTER
· Hematologic/Lymphatic: No abnormal bruising or bleeding, blood clots or swollen lymph nodes.     Physical Examination:    Vitals:    05/07/19 1304 05/07/19 1308   BP: (!) 160/54 (!) 120/54   Pulse: 70    SpO2: 98%    Weight: 144 lb (65.3 kg)    Height: 5' 5\" (1.651 m)    ·     Constitutional and General Appearance: no apparent distress, thin  HEENT: non-icteric sclera, oropharynx without exudate, oral mucosa moist  Neck: JVP less than 8 cm H20  Respiratory:  · No use of accessory muscles  · Clear breath sounds throughout, no wheezing, no crackles, no rhonchi  Cardiovascular:  · The apical impulses not displaced  · no murmur/rub/gallop  · Regular rate and rhythm, S1,S2 normal  · Radial pulses 2+ and equal bilaterally  · No edema  · Pedal Pulses: 2+ and equal   Abdomen:  · No masses or tenderness  · Liver: No Abnormalities Noted  Musculoskeletal/Skin:  · Exhibits normal gait balance and coordination  · There is no clubbing, cyanosis of the extremities  · Skin is warm and dry  · Moves all extremities well  Neurological/Psychiatric:  · Alert and oriented in all spheres  · No abnormalities of mood, affect, memory, mentation, or behavior are noted      Lab Data:  CBC:   Lab Results   Component Value Date    WBC 10.4 04/22/2019    WBC 9.3 03/28/2018    WBC 17.3 03/27/2018    RBC 5.07 04/22/2019    RBC 4.21 03/28/2018    RBC 4.18 03/27/2018    HGB 14.6 04/22/2019    HGB 11.5 03/28/2018    HGB 11.4 03/27/2018    HCT 43.0 04/22/2019    HCT 34.6 03/28/2018    HCT 34.4 03/27/2018    MCV 84.8 04/22/2019    MCV 82.2 03/28/2018    MCV 82.4 03/27/2018    RDW 13.8 04/22/2019    RDW 13.2 03/28/2018    RDW 13.5 03/27/2018     04/22/2019     03/28/2018     03/27/2018     BMP:   Lab Results   Component Value Date     04/22/2019     11/21/2018     08/24/2018    K 5.2 04/22/2019    K 3.9 11/21/2018    K 4.4 08/24/2018    CL 99 04/22/2019    CL 99 11/21/2018    CL 95 08/24/2018    CO2 26 04/22/2019 CO2 26 11/21/2018    CO2 25 08/24/2018    PHOS 3.1 03/28/2018    PHOS 2.0 03/27/2018    PHOS 3.0 06/28/2017    BUN 9 04/22/2019    BUN 9 11/21/2018    BUN 10 08/24/2018    CREATININE 1.2 04/22/2019    CREATININE 1.2 11/21/2018    CREATININE 1.4 08/24/2018     BNP:   Lab Results   Component Value Date    PROBNP 1,631 03/26/2018       Recent Testing:    Echo 3/1/18   Summary   The left ventricular systolic function is moderately reduced with an   ejection fraction of 40%.  Mild concentric left ventricular hypertrophy.   The apex and apical segments appear hypokinetic.   Left ventricular cavity size is normal.   Grade I diastolic dysfunction with normal filing pressure.   Mild mitral regurgitation.   Systolic pulmonary artery pressure (SPAP) is normal and estimated at 27 mmHg   (RA pressure 3 mmHg).   Pacer / ICD wire is visualized in the right ventricle.   Last echo on 6/28/2017 showed EF 30-35%. MUGA scan on 9/28/17 showed an EF of 20%. STRESS TEST: 8/9/2016     Summary  Small-moderate sized apical, inferoapical, and inferoseptal fixed defects  consistent with infarction in the territory of the mid LAD and/or LCx. There  is severe global LV systolic dysfunction with ejection fraction of 23%. There is asynchronous septal motion which may be due to LBBB and severe HK  of the apex. Higher risk abnormal study. CATH: 09/13/16  ANGIOGRAPHIC FINDINGS:   1. Multivessel critical coronary artery disease with ostial LAD and critical  circ and right coronary artery disease. 2. Reduced left ventricular function with an EF of 20% and global left  ventricular function. 3. Normal left and right heart hemodynamics.      BYPASS: 09/16/16  Urgent CABG X 3, with pedicled LIMA to LAD, sequential Greater Saphenous VG to OM 2 then on to PV br of RCA, SGC, CPB, EVH Right Greater Saphenous vein, MOUNIKA, Epiaortic ultrasound, Doppler verification of grafts, Bilateral 5 level intercostal nerve block(Exparel), Platelet gel application. VASCULAR:  Carotid: 09/13/16  Summary    1. There is minimal plaque seen in the right internal carotid artery with an  estimated diameter reduction of <50%. 2. There is minimal plaque seen in the left internal carotid artery with an  estimated diameter reduction of <50%. 3. The vertebral arteries are patent with antegrade flow bilaterally. Pertinent Problems:  · ischemic cardiomyopathy, chronic systolic CHF. LVEF 20% on MUGA; 40% on ECHO  · CAD s/p CABG in 9/2016. · CVA  · HTN  · NSVT, LBBB s/p biventricular ICD 11/2017  · FARNAZ  · Anemia- stable  · DM    Visit Diagnosis:    1. Chronic combined systolic and diastolic congestive heart failure ([address]    2. Essential hypertension    3. 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 Malabsorption due to intolerance, not elsewhere classified       Plan:     1. Check at least weekly weights to monitor weight gains and weight loss  2. Target 64 ounces of fluid a day or 2L a day  3. Check vitamin D level given his fatigue and poor intake, and minimal sun exposure  4. Stay as active as possible  5. Recheck potassium level with next follow up with PCP; counseled on high potassium foods  6. Follow up with EP as planned  7. Follow up in March with repeat echocardiogram      QUALITY MEASURES  1. Tobacco Cessation Counseling: NA  2. Retake of BP if >140/90:   NA  3. Documentation to PCP/referring for new patient:  Sent to PCP at close of office visit  4. CAD patient on anti-platelet: Yes  5. CAD patient on STATIN therapy:  Yes  6. Patient with CHF and aFib on anticoagulation:  NA     I appreciate the opportunity for caring for this patient.      Govind Yuen, CNP, 5/7/2019, 1:06 PM

## 2019-05-07 NOTE — PROGRESS NOTES
[facility]   Cardiac follow up     Referring Provider:  Gabe Skelton MD         HPI:  Yoly Valente is a 75 y.o. male who was referred by Dr. Kurt Wilson for evaluation SCA risk. He has CAD and underwent CABS on 9/16/16 for 3 vessel CAD. MRI in 2016 showed an EF of 23%. His Echo on 6/26/17 showed an EF 33%. He wore a cardiac event monitor from 6/29-7/12/17, average heart rate was 79 (), NSVT. He currently feels well. He denies chest pain, palpitations, dizziness or syncope. He is not very active, but denies SOB or excessive fatigue. His daughter states he does not complain and will not tell if he has chest pain. He states he has dizziness when he stands up fast or rolls over in bed fast. He has had a sleep study and will be getting a CPAP machine. His EKG today shows sinus rhythm with LBBB. MUGA scan on 9/28/17 showed an EF of 20%. He denies dizziness or syncope. He underwent BiV ICD placement 11/2/2017. Today he is here for follow up for post MI cardiomyopathy and device management. Device check today shows normal function. On 1/8/19 he had 8 seconds of PAT. No VT has been identified. His echocardiogram from 3/1/18 demonstrated his left ventricular ejection fraction improved to 40%. He states he has been feeling well. He has not been very active and  likes to play video games. He walked around 1/2 a mile today and felt well. Patient currently denies any weight gain, edema, palpitations, chest pain, shortness of breath, dizziness, and syncope. Past Medical History:   has a past medical history of Abnormal echocardiogram, Abnormal stress test, Cardiomyopathy ([address] CHF (congestive heart failure) ([address] Diabetes ([address] Hyperlipidemia, and Hypertension. Surgical History:   has a past surgical history that includes Neck surgery; Colonoscopy (07/16/2016); Coronary artery bypass graft (09/16/2016); Upper gastrointestinal endoscopy (05/22/2017); and Cystoscopy (03/2018). Social History:   reports that he has never smoked. He has never used smokeless tobacco. He reports that he does not drink alcohol or use drugs. Family History:  family history includes Cancer in his brother and father; Diabetes in his brother, father, and sister; Heart Disease in his father; High Blood Pressure in his father. Home Medications:  Outpatient Encounter Medications as of 5/7/2019   Medication Sig Dispense Refill    lisinopril (PRINIVIL;ZESTRIL) 5 MG tablet Take 1 tablet by mouth daily 90 tablet 1    finasteride (PROSCAR) 5 MG tablet Take 1 tablet by mouth daily 90 tablet 0    atorvastatin (LIPITOR) 40 MG tablet Take 1 tablet by mouth nightly 90 tablet 3    digoxin (LANOXIN) 125 MCG tablet Take 1 tablet by mouth daily 90 tablet 1    metoprolol succinate (TOPROL XL) 25 MG extended release tablet Take 1 tablet by mouth nightly 30 tablet 11    pantoprazole (PROTONIX) 40 MG tablet Take 1 tablet by mouth daily 90 tablet 1    glipiZIDE (GLUCOTROL) 5 MG tablet Take 1 tablet by mouth 2 times daily (before meals) 180 tablet 0    clopidogrel (PLAVIX) 75 MG tablet Take 1 tablet by mouth daily 90 tablet 0    aspirin 81 MG tablet Take 81 mg by mouth daily        No facility-administered encounter medications on file as of 5/7/2019. Allergies: Iv dye [iodides]; Persantine [dipyridamole]; and Coreg [carvedilol]     Review of Systems   Constitutional: Negative. HENT: Negative. Eyes: Negative. Respiratory: Negative. Cardiovascular: Negative. Gastrointestinal: Negative. Genitourinary: Negative. Musculoskeletal: Negative. Skin: Negative. Neurological: Negative. Hematological: Negative. Psychiatric/Behavioral: Negative. BP (!) 120/54   Pulse 65   Ht 5' 5\" (1.651 m)   Wt 144 lb (65.3 kg)   SpO2 98%   BMI 23.96 kg/m²       Objective:  Physical Exam   Constitutional: He is oriented to person, place, and time. He appears well-developed and well-nourished. HENT:   Head: Normocephalic and atraumatic. Eyes: Pupils are equal, round, and reactive to light. Neck: Normal range of motion. Cardiovascular: Normal rate, regular rhythm and normal heart sounds. Pulmonary/Chest: Effort normal and breath sounds normal.   Abdominal: Soft. No tenderness. Musculoskeletal: Normal range of motion. He exhibits no edema. Neurological: He is alert and oriented to person, place, and time. Skin: Skin is warm and dry. Psychiatric: He has a normal mood and affect. Assessment:  1. Post MI cardiomyopathy with LVEF improvement from . 20 to . 40.   2. S/p BiV iCD placement 11/21/2017. He has excellent lead placement and substantial improvement in his QRS duration by ECG, and has had significant clinical improvement. 2. CHF- NYHA  class 2   3. LBBB    Plan:  1. Remote device checks from home very 3 months   2. Continue current medications. 3. Remain as active as possible. 4. Follow up with me in 1 year. QUALITY MEASURES  1. Tobacco Cessation Counseling: NA  2. Retake of BP if >140/90:   NA  3. Documentation to PCP/referring for new patient:  Sent to PCP at close of office visit  4. CAD patient on anti-platelet: Yes  5. CAD patient on STATIN therapy:  Yes  6. Patient with CHF and aFib on anticoagulation:  NA       I, Dr. Deya Lozano, personally performed the services described in this documentation as scribed by Miguel Borrero RN in my presence, and it is both accurate and complete.       Deya Lozano M.D.

## 2019-05-07 NOTE — PATIENT INSTRUCTIONS
Plan:  1. Remote device checks from home very 3 months   2. Continue current medications. 3. Remain as active as possible. 4. Follow up with me in 1 year.

## 2019-05-07 NOTE — LETTER
[facility]   Cardiac Follow-up    Primary Care Doctor: Claudia Son MD    Chief Complaint   Patient presents with    Follow-up    Cardiomyopathy        History of Present Illness:   I had the pleasure of seeing Moiz Tate in follow up for systolic heart failure. He  has a history of ischemic cardiomyopathy, CAD, CVA, HTN, LBBB, chronic systolic CHF, NSVT, and FARNAZ. He had a CABG in 9/2016. EF was 33%. his EF remained 20% on MUGA in 9/2017. On 11/21/2017 he had a biventricular ICD implanted. Since last visit, lisinopril was increased to 5mg daily for his cardiomyopathy. Occasional tightness in the chest for a few seconds while riding in the car, without radiation. No edema. Sleeping okay. Appetite is down issues with swallowing. No weight gain. Here with daughter. Main complaint is fatigue, no motivation to do a lot of things, but he is getting out of the house. He hasn't been taking the higher diabetic medication as recently prescribed by PCP. Breathing is stable. Continues with fatigue. Moiz Tate describes symptoms including chest pain but denies palpitations, orthopnea, edema, syncope. NYHA:   I  ACC/ AHA Stage:    C    Past Medical History:   has a past medical history of Abnormal echocardiogram, Abnormal stress test, Cardiomyopathy ([address] CHF (congestive heart failure) ([address] Diabetes ([address] Hyperlipidemia, and Hypertension. Surgical History:   has a past surgical history that includes Neck surgery; Colonoscopy (07/16/2016); Coronary artery bypass graft (09/16/2016); Upper gastrointestinal endoscopy (05/22/2017); and Cystoscopy (03/2018). Social History:   reports that he has never smoked. He has never used smokeless tobacco. He reports that he does not drink alcohol or use drugs.    Family History:   Family History   Problem Relation Age of Onset    Diabetes Father     Heart Disease Father     Cancer Father     High Blood Pressure Father  Diabetes Sister     Diabetes Brother     Cancer Brother         pancreatic       Home Medications:  Prior to Admission medications    Medication Sig Start Date End Date Taking? Authorizing Provider   lisinopril (PRINIVIL;ZESTRIL) 5 MG tablet Take 1 tablet by mouth daily 4/5/19  Yes Yaneth Mejia MD   finasteride (PROSCAR) 5 MG tablet Take 1 tablet by mouth daily 4/5/19  Yes Yaneth Mejia MD   atorvastatin (LIPITOR) 40 MG tablet Take 1 tablet by mouth nightly 4/5/19  Yes Yaneth Mejia MD   digoxin [facility]) 125 MCG tablet Take 1 tablet by mouth daily 4/5/19  Yes Yaneth Mejia MD   metoprolol succinate (TOPROL XL) 25 MG extended release tablet Take 1 tablet by mouth nightly 4/5/19  Yes Yaneth Mejia MD   pantoprazole (PROTONIX) 40 MG tablet Take 1 tablet by mouth daily 4/5/19  Yes Yaneth Mejia MD   glipiZIDE (GLUCOTROL) 5 MG tablet Take 1 tablet by mouth 2 times daily (before meals) 4/5/19  Yes Yaneth Mejia MD   clopidogrel (PLAVIX) 75 MG tablet Take 1 tablet by mouth daily 4/5/19  Yes Yaneth Mejia MD   aspirin 81 MG tablet Take 81 mg by mouth daily    Yes Historical Provider, MD        Allergies: Iv dye [iodides]; Persantine [dipyridamole]; and Coreg [carvedilol]     Review of Systems:   · Constitutional: there has been no unanticipated weight loss. · Eyes: No vision changes  · ENT: No Headaches, no nasal congestion. No mouth sores or sore throat. + swallowing difficulty  · Cardiovascular: Reviewed in HPI  · Respiratory: No cough or wheezing, no sputum production. · Gastrointestinal: No abdominal pain, no constipation or diarrhea  · Genitourinary: No dysuria, trouble voiding, or hematuria. · Musculoskeletal:  + weakness or joint complaints. · Integumentary: No rash or pruritis. · Neurological: No numbness or tingling. No weakness. No tremor. · Psychiatric: No anxiety, no depression. · Endocrine:  No excessive thirst or urination. · Hematologic/Lymphatic: No abnormal bruising or bleeding, blood clots or swollen lymph nodes.     Physical Examination:    Vitals:    05/07/19 1304 05/07/19 1308   BP: (!) 160/54 (!) 120/54   Pulse: 70    SpO2: 98%    Weight: 144 lb (65.3 kg)    Height: 5' 5\" (1.651 m)    ·     Constitutional and General Appearance: no apparent distress, thin  HEENT: non-icteric sclera, oropharynx without exudate, oral mucosa moist  Neck: JVP less than 8 cm H20  Respiratory:  · No use of accessory muscles  · Clear breath sounds throughout, no wheezing, no crackles, no rhonchi  Cardiovascular:  · The apical impulses not displaced  · no murmur/rub/gallop  · Regular rate and rhythm, S1,S2 normal  · Radial pulses 2+ and equal bilaterally  · No edema  · Pedal Pulses: 2+ and equal   Abdomen:  · No masses or tenderness  · Liver: No Abnormalities Noted  Musculoskeletal/Skin:  · Exhibits normal gait balance and coordination  · There is no clubbing, cyanosis of the extremities  · Skin is warm and dry  · Moves all extremities well  Neurological/Psychiatric:  · Alert and oriented in all spheres  · No abnormalities of mood, affect, memory, mentation, or behavior are noted      Lab Data:  CBC:   Lab Results   Component Value Date    WBC 10.4 04/22/2019    WBC 9.3 03/28/2018    WBC 17.3 03/27/2018    RBC 5.07 04/22/2019    RBC 4.21 03/28/2018    RBC 4.18 03/27/2018    HGB 14.6 04/22/2019    HGB 11.5 03/28/2018    HGB 11.4 03/27/2018    HCT 43.0 04/22/2019    HCT 34.6 03/28/2018    HCT 34.4 03/27/2018    MCV 84.8 04/22/2019    MCV 82.2 03/28/2018    MCV 82.4 03/27/2018    RDW 13.8 04/22/2019    RDW 13.2 03/28/2018    RDW 13.5 03/27/2018     04/22/2019     03/28/2018     03/27/2018     BMP:   Lab Results   Component Value Date     04/22/2019     11/21/2018     08/24/2018    K 5.2 04/22/2019    K 3.9 11/21/2018    K 4.4 08/24/2018    CL 99 04/22/2019    CL 99 11/21/2018    CL 95 08/24/2018    CO2 26 04/22/2019 CO2 26 11/21/2018    CO2 25 08/24/2018    PHOS 3.1 03/28/2018    PHOS 2.0 03/27/2018    PHOS 3.0 06/28/2017    BUN 9 04/22/2019    BUN 9 11/21/2018    BUN 10 08/24/2018    CREATININE 1.2 04/22/2019    CREATININE 1.2 11/21/2018    CREATININE 1.4 08/24/2018     BNP:   Lab Results   Component Value Date    PROBNP 1,631 03/26/2018       Recent Testing:    Echo 3/1/18   Summary   The left ventricular systolic function is moderately reduced with an   ejection fraction of 40%.  Mild concentric left ventricular hypertrophy.   The apex and apical segments appear hypokinetic.   Left ventricular cavity size is normal.   Grade I diastolic dysfunction with normal filing pressure.   Mild mitral regurgitation.   Systolic pulmonary artery pressure (SPAP) is normal and estimated at 27 mmHg   (RA pressure 3 mmHg).   Pacer / ICD wire is visualized in the right ventricle.   Last echo on 6/28/2017 showed EF 30-35%. MUGA scan on 9/28/17 showed an EF of 20%. STRESS TEST: 8/9/2016     Summary  Small-moderate sized apical, inferoapical, and inferoseptal fixed defects  consistent with infarction in the territory of the mid LAD and/or LCx. There  is severe global LV systolic dysfunction with ejection fraction of 23%. There is asynchronous septal motion which may be due to LBBB and severe HK  of the apex. Higher risk abnormal study. CATH: 09/13/16  ANGIOGRAPHIC FINDINGS:   1. Multivessel critical coronary artery disease with ostial LAD and critical  circ and right coronary artery disease. 2. Reduced left ventricular function with an EF of 20% and global left  ventricular function. 3. Normal left and right heart hemodynamics.      BYPASS: 09/16/16  Urgent CABG X 3, with pedicled LIMA to LAD, sequential Greater Saphenous VG to OM 2 then on to PV br of RCA, SGC, CPB, EVH Right Greater Saphenous vein, MOUNIKA, Epiaortic ultrasound, Doppler verification of grafts, Bilateral

## 2019-05-07 NOTE — PROGRESS NOTES
[facility]   Cardiac Follow-up    Primary Care Doctor: Jolaine Hatchet, MD    Chief Complaint   Patient presents with    Follow-up    Cardiomyopathy        History of Present Illness:   I had the pleasure of seeing Chelsey Nolan in follow up for systolic heart failure. He  has a history of ischemic cardiomyopathy, CAD, CVA, HTN, LBBB, chronic systolic CHF, NSVT, and FARANZ. He had a CABG in 9/2016. EF was 33%. his EF remained 20% on MUGA in 9/2017. On 11/21/2017 he had a biventricular ICD implanted. Since last visit, lisinopril was increased to 5mg daily for his cardiomyopathy. Occasional tightness in the chest for a few seconds while riding in the car, without radiation. No edema. Sleeping okay. Appetite is down issues with swallowing. No weight gain. Here with daughter. Main complaint is fatigue, no motivation to do a lot of things, but he is getting out of the house. He hasn't been taking the higher diabetic medication as recently prescribed by PCP. Breathing is stable. Continues with fatigue. Chelsey Nolan describes symptoms including chest pain but denies palpitations, orthopnea, edema, syncope. NYHA:   I  ACC/ AHA Stage:    C    Past Medical History:   has a past medical history of Abnormal echocardiogram, Abnormal stress test, Cardiomyopathy ([address] CHF (congestive heart failure) ([address] Diabetes ([address] Hyperlipidemia, and Hypertension. Surgical History:   has a past surgical history that includes Neck surgery; Colonoscopy (07/16/2016); Coronary artery bypass graft (09/16/2016); Upper gastrointestinal endoscopy (05/22/2017); and Cystoscopy (03/2018). Social History:   reports that he has never smoked. He has never used smokeless tobacco. He reports that he does not drink alcohol or use drugs.    Family History:   Family History   Problem Relation Age of Onset    Diabetes Father     Heart Disease Father     Cancer Father     High Blood Pressure Father     Diabetes No abnormal bruising or bleeding, blood clots or swollen lymph nodes.     Physical Examination:    Vitals:    05/07/19 1304 05/07/19 1308   BP: (!) 160/54 (!) 120/54   Pulse: 70    SpO2: 98%    Weight: 144 lb (65.3 kg)    Height: 5' 5\" (1.651 m)    ·     Constitutional and General Appearance: no apparent distress, thin  HEENT: non-icteric sclera, oropharynx without exudate, oral mucosa moist  Neck: JVP less than 8 cm H20  Respiratory:  · No use of accessory muscles  · Clear breath sounds throughout, no wheezing, no crackles, no rhonchi  Cardiovascular:  · The apical impulses not displaced  · no murmur/rub/gallop  · Regular rate and rhythm, S1,S2 normal  · Radial pulses 2+ and equal bilaterally  · No edema  · Pedal Pulses: 2+ and equal   Abdomen:  · No masses or tenderness  · Liver: No Abnormalities Noted  Musculoskeletal/Skin:  · Exhibits normal gait balance and coordination  · There is no clubbing, cyanosis of the extremities  · Skin is warm and dry  · Moves all extremities well  Neurological/Psychiatric:  · Alert and oriented in all spheres  · No abnormalities of mood, affect, memory, mentation, or behavior are noted      Lab Data:  CBC:   Lab Results   Component Value Date    WBC 10.4 04/22/2019    WBC 9.3 03/28/2018    WBC 17.3 03/27/2018    RBC 5.07 04/22/2019    RBC 4.21 03/28/2018    RBC 4.18 03/27/2018    HGB 14.6 04/22/2019    HGB 11.5 03/28/2018    HGB 11.4 03/27/2018    HCT 43.0 04/22/2019    HCT 34.6 03/28/2018    HCT 34.4 03/27/2018    MCV 84.8 04/22/2019    MCV 82.2 03/28/2018    MCV 82.4 03/27/2018    RDW 13.8 04/22/2019    RDW 13.2 03/28/2018    RDW 13.5 03/27/2018     04/22/2019     03/28/2018     03/27/2018     BMP:   Lab Results   Component Value Date     04/22/2019     11/21/2018     08/24/2018    K 5.2 04/22/2019    K 3.9 11/21/2018    K 4.4 08/24/2018    CL 99 04/22/2019    CL 99 11/21/2018    CL 95 08/24/2018    CO2 26 04/22/2019    CO2 26 11/21/2018 CO2 25 08/24/2018    PHOS 3.1 03/28/2018    PHOS 2.0 03/27/2018    PHOS 3.0 06/28/2017    BUN 9 04/22/2019    BUN 9 11/21/2018    BUN 10 08/24/2018    CREATININE 1.2 04/22/2019    CREATININE 1.2 11/21/2018    CREATININE 1.4 08/24/2018     BNP:   Lab Results   Component Value Date    PROBNP 1,631 03/26/2018       Recent Testing:    Echo 3/1/18   Summary   The left ventricular systolic function is moderately reduced with an   ejection fraction of 40%.  Mild concentric left ventricular hypertrophy.   The apex and apical segments appear hypokinetic.   Left ventricular cavity size is normal.   Grade I diastolic dysfunction with normal filing pressure.   Mild mitral regurgitation.   Systolic pulmonary artery pressure (SPAP) is normal and estimated at 27 mmHg   (RA pressure 3 mmHg).   Pacer / ICD wire is visualized in the right ventricle.   Last echo on 6/28/2017 showed EF 30-35%. MUGA scan on 9/28/17 showed an EF of 20%. STRESS TEST: 8/9/2016     Summary  Small-moderate sized apical, inferoapical, and inferoseptal fixed defects  consistent with infarction in the territory of the mid LAD and/or LCx. There  is severe global LV systolic dysfunction with ejection fraction of 23%. There is asynchronous septal motion which may be due to LBBB and severe HK  of the apex. Higher risk abnormal study. CATH: 09/13/16  ANGIOGRAPHIC FINDINGS:   1. Multivessel critical coronary artery disease with ostial LAD and critical  circ and right coronary artery disease. 2. Reduced left ventricular function with an EF of 20% and global left  ventricular function. 3. Normal left and right heart hemodynamics. BYPASS: 09/16/16  Urgent CABG X 3, with pedicled LIMA to LAD, sequential Greater Saphenous VG to OM 2 then on to PV br of RCA, SGC, CPB, EVH Right Greater Saphenous vein, MOUNIKA, Epiaortic ultrasound, Doppler verification of grafts, Bilateral 5 level intercostal nerve block(Exparel), Platelet gel application. VASCULAR:  Carotid: 09/13/16  Summary    1. There is minimal plaque seen in the right internal carotid artery with an  estimated diameter reduction of <50%. 2. There is minimal plaque seen in the left internal carotid artery with an  estimated diameter reduction of <50%. 3. The vertebral arteries are patent with antegrade flow bilaterally. Pertinent Problems:  · ischemic cardiomyopathy, chronic systolic CHF. LVEF 20% on MUGA; 40% on ECHO  · CAD s/p CABG in 9/2016. · CVA  · HTN  · NSVT, LBBB s/p biventricular ICD 11/2017  · FARNAZ  · Anemia- stable  · DM    Visit Diagnosis:    1. Chronic combined systolic and diastolic congestive heart failure ([address]    2. Essential hypertension    3. 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 Malabsorption due to intolerance, not elsewhere classified       Plan:     1. Check at least weekly weights to monitor weight gains and weight loss  2. Target 64 ounces of fluid a day or 2L a day  3. Check vitamin D level given his fatigue and poor intake, and minimal sun exposure  4. Stay as active as possible  5. Recheck potassium level with next follow up with PCP; counseled on high potassium foods  6. Follow up with EP as planned  7. Follow up in March with repeat echocardiogram      QUALITY MEASURES  1. Tobacco Cessation Counseling: NA  2. Retake of BP if >140/90:   NA  3. Documentation to PCP/referring for new patient:  Sent to PCP at close of office visit  4. CAD patient on anti-platelet: Yes  5. CAD patient on STATIN therapy:  Yes  6. Patient with CHF and aFib on anticoagulation:  NA     I appreciate the opportunity for caring for this patient.      Robe Barillas CNP, 5/7/2019, 1:06 PM

## 2019-08-13 ENCOUNTER — NURSE ONLY (OUTPATIENT)
Dept: CARDIOLOGY CLINIC | Age: 74
End: 2019-08-13
Payer: MEDICARE

## 2019-08-13 DIAGNOSIS — Z95.810 CARDIAC RESYNCHRONIZATION THERAPY DEFIBRILLATOR (CRT-D) IN PLACE: ICD-10-CM

## 2019-08-13 DIAGNOSIS — I42.0 DILATED CARDIOMYOPATHY (HCC): ICD-10-CM

## 2019-08-13 DIAGNOSIS — I50.42 CHRONIC COMBINED SYSTOLIC AND DIASTOLIC CONGESTIVE HEART FAILURE (HCC): ICD-10-CM

## 2019-08-13 PROCEDURE — 93295 DEV INTERROG REMOTE 1/2/MLT: CPT | Performed by: INTERNAL MEDICINE

## 2019-08-13 PROCEDURE — 93297 REM INTERROG DEV EVAL ICPMS: CPT | Performed by: INTERNAL MEDICINE

## 2019-08-13 PROCEDURE — 93296 REM INTERROG EVL PM/IDS: CPT | Performed by: INTERNAL MEDICINE

## 2019-08-15 ENCOUNTER — TELEPHONE (OUTPATIENT)
Dept: CARDIOLOGY CLINIC | Age: 74
End: 2019-08-15

## 2019-08-30 ENCOUNTER — OFFICE VISIT (OUTPATIENT)
Dept: INTERNAL MEDICINE CLINIC | Age: 74
End: 2019-08-30

## 2019-08-30 VITALS
HEART RATE: 70 BPM | HEIGHT: 65 IN | BODY MASS INDEX: 22.49 KG/M2 | WEIGHT: 135 LBS | DIASTOLIC BLOOD PRESSURE: 75 MMHG | SYSTOLIC BLOOD PRESSURE: 125 MMHG | RESPIRATION RATE: 18 BRPM

## 2019-08-30 DIAGNOSIS — I70.209 DIABETES MELLITUS TYPE 2 WITH ATHEROSCLEROSIS OF ARTERIES OF EXTREMITIES (HCC): Chronic | ICD-10-CM

## 2019-08-30 DIAGNOSIS — Z95.810 CARDIAC RESYNCHRONIZATION THERAPY DEFIBRILLATOR (CRT-D) IN PLACE: ICD-10-CM

## 2019-08-30 DIAGNOSIS — E78.2 MIXED HYPERLIPIDEMIA: Chronic | ICD-10-CM

## 2019-08-30 DIAGNOSIS — I50.42 CHRONIC COMBINED SYSTOLIC AND DIASTOLIC CONGESTIVE HEART FAILURE (HCC): ICD-10-CM

## 2019-08-30 DIAGNOSIS — E11.51 DIABETES MELLITUS TYPE 2 WITH ATHEROSCLEROSIS OF ARTERIES OF EXTREMITIES (HCC): Chronic | ICD-10-CM

## 2019-08-30 DIAGNOSIS — I10 ESSENTIAL HYPERTENSION: Primary | Chronic | ICD-10-CM

## 2019-08-30 LAB
A/G RATIO: 1.4 (ref 1.1–2.2)
ALBUMIN SERPL-MCNC: 4.6 G/DL (ref 3.4–5)
ALP BLD-CCNC: 78 U/L (ref 40–129)
ALT SERPL-CCNC: 23 U/L (ref 10–40)
ANION GAP SERPL CALCULATED.3IONS-SCNC: 14 MMOL/L (ref 3–16)
AST SERPL-CCNC: 22 U/L (ref 15–37)
BILIRUB SERPL-MCNC: 0.4 MG/DL (ref 0–1)
BUN BLDV-MCNC: 11 MG/DL (ref 7–20)
CALCIUM SERPL-MCNC: 10 MG/DL (ref 8.3–10.6)
CHLORIDE BLD-SCNC: 102 MMOL/L (ref 99–110)
CO2: 26 MMOL/L (ref 21–32)
CREAT SERPL-MCNC: 1.2 MG/DL (ref 0.8–1.3)
CREATININE URINE: 145.1 MG/DL (ref 39–259)
GFR AFRICAN AMERICAN: >60
GFR NON-AFRICAN AMERICAN: 59
GLOBULIN: 3.2 G/DL
GLUCOSE BLD-MCNC: 98 MG/DL (ref 70–99)
MICROALBUMIN UR-MCNC: <1.2 MG/DL
MICROALBUMIN/CREAT UR-RTO: NORMAL MG/G (ref 0–30)
POTASSIUM SERPL-SCNC: 4.6 MMOL/L (ref 3.5–5.1)
SODIUM BLD-SCNC: 142 MMOL/L (ref 136–145)
TOTAL PROTEIN: 7.8 G/DL (ref 6.4–8.2)

## 2019-08-30 PROCEDURE — 81002 URINALYSIS NONAUTO W/O SCOPE: CPT | Performed by: INTERNAL MEDICINE

## 2019-08-30 PROCEDURE — 4040F PNEUMOC VAC/ADMIN/RCVD: CPT | Performed by: INTERNAL MEDICINE

## 2019-08-30 PROCEDURE — 3045F PR MOST RECENT HEMOGLOBIN A1C LEVEL 7.0-9.0%: CPT | Performed by: INTERNAL MEDICINE

## 2019-08-30 PROCEDURE — 3017F COLORECTAL CA SCREEN DOC REV: CPT | Performed by: INTERNAL MEDICINE

## 2019-08-30 PROCEDURE — G8420 CALC BMI NORM PARAMETERS: HCPCS | Performed by: INTERNAL MEDICINE

## 2019-08-30 PROCEDURE — 1123F ACP DISCUSS/DSCN MKR DOCD: CPT | Performed by: INTERNAL MEDICINE

## 2019-08-30 PROCEDURE — G8427 DOCREV CUR MEDS BY ELIG CLIN: HCPCS | Performed by: INTERNAL MEDICINE

## 2019-08-30 PROCEDURE — 2022F DILAT RTA XM EVC RTNOPTHY: CPT | Performed by: INTERNAL MEDICINE

## 2019-08-30 PROCEDURE — G8598 ASA/ANTIPLAT THER USED: HCPCS | Performed by: INTERNAL MEDICINE

## 2019-08-30 PROCEDURE — 1036F TOBACCO NON-USER: CPT | Performed by: INTERNAL MEDICINE

## 2019-08-30 PROCEDURE — 99213 OFFICE O/P EST LOW 20 MIN: CPT | Performed by: INTERNAL MEDICINE

## 2019-08-30 NOTE — PROGRESS NOTES
activity change, fatigue and unexpected weight change. HENT:  Negative for ear discharge, hearing loss, postnasal drip and rhinorrhea. Respiratory: Negative for chest tightness and shortness of breath. Cardiovascular: Negative for chest pain and palpitations. Endocrine: Positive for polyuria. Genitourinary: Negative for frequency and hematuria. Musculoskeletal: Negative for neck stiffness. Skin: Negative for color change. Allergic/Immunologic: Negative for immunocompromised state. Neurological: Negative for weakness. Hematological: Negative for adenopathy. Objective:   Physical Exam     Vitals:    08/30/19 1628   BP: 125/75   Pulse: 70   Resp: 18       General:  eldelry male,  Thin built  Awake, alert and oriented. Appears to be not in any distress  Mucous Membranes:  Pink , anicteric  Neck: No JVD, no carotid bruit, no thyromegaly  Chest:  Clear to auscultation bilaterally, no added sounds  Cardiovascular:  RRR S1S2 heard, no murmurs or gallops  Abdomen:  Soft, undistended, non tender, no organomegaly, BS present  Extremities: No edema or cyanosis. Distal pulses well felt  Neurological : grossly normal  Visual inspection:  Deformity/amputation: absent  Skin lesions/pre-ulcerative calluses: absent  Edema: right- negative, left- negative    Sensory exam:  Monofilament sensation: normal  (minimum of 5 random plantar locations tested, avoiding callused areas - > 1 area with absence of sensation is + for neuropathy)    Plus at least one of the following:  Pulses: normal,   Pinprick: Intact  Proprioception: Intact  Vibration (128 Hz): Intact      MUGA      Abnormal resting left ventricular function with inferior, inferolateral, and    septal wall hypokinesis and EF= 20% .               MRI   Multiple small acute infarcts in right MCA territory.       Slow flow versus occlusion suggested in right middle cerebral artery M3   division.       Remote cerebral and cerebellar infarcts.

## 2019-08-31 LAB
ESTIMATED AVERAGE GLUCOSE: 131.2 MG/DL
HBA1C MFR BLD: 6.2 %

## 2019-11-12 ENCOUNTER — NURSE ONLY (OUTPATIENT)
Dept: CARDIOLOGY CLINIC | Age: 74
End: 2019-11-12
Payer: MEDICARE

## 2019-11-12 DIAGNOSIS — Z95.810 CARDIAC RESYNCHRONIZATION THERAPY DEFIBRILLATOR (CRT-D) IN PLACE: ICD-10-CM

## 2019-11-12 DIAGNOSIS — I50.42 CHRONIC COMBINED SYSTOLIC AND DIASTOLIC CONGESTIVE HEART FAILURE (HCC): ICD-10-CM

## 2019-11-12 DIAGNOSIS — I42.0 DILATED CARDIOMYOPATHY (HCC): ICD-10-CM

## 2019-11-12 PROCEDURE — 93296 REM INTERROG EVL PM/IDS: CPT | Performed by: INTERNAL MEDICINE

## 2019-11-12 PROCEDURE — 93295 DEV INTERROG REMOTE 1/2/MLT: CPT | Performed by: INTERNAL MEDICINE

## 2019-11-12 PROCEDURE — 93297 REM INTERROG DEV EVAL ICPMS: CPT | Performed by: INTERNAL MEDICINE

## 2019-12-19 ENCOUNTER — OFFICE VISIT (OUTPATIENT)
Dept: INTERNAL MEDICINE CLINIC | Age: 74
End: 2019-12-19

## 2019-12-19 VITALS
HEART RATE: 60 BPM | HEIGHT: 65 IN | BODY MASS INDEX: 23.32 KG/M2 | SYSTOLIC BLOOD PRESSURE: 150 MMHG | RESPIRATION RATE: 18 BRPM | WEIGHT: 140 LBS | DIASTOLIC BLOOD PRESSURE: 70 MMHG

## 2019-12-19 DIAGNOSIS — I70.209 DIABETES MELLITUS TYPE 2 WITH ATHEROSCLEROSIS OF ARTERIES OF EXTREMITIES (HCC): ICD-10-CM

## 2019-12-19 DIAGNOSIS — Z00.00 ROUTINE GENERAL MEDICAL EXAMINATION AT A HEALTH CARE FACILITY: ICD-10-CM

## 2019-12-19 DIAGNOSIS — N42.9 PROSTATE DISEASE: ICD-10-CM

## 2019-12-19 DIAGNOSIS — E78.2 MIXED HYPERLIPIDEMIA: ICD-10-CM

## 2019-12-19 DIAGNOSIS — E11.51 DIABETES MELLITUS TYPE 2 WITH ATHEROSCLEROSIS OF ARTERIES OF EXTREMITIES (HCC): ICD-10-CM

## 2019-12-19 DIAGNOSIS — I50.42 CHRONIC COMBINED SYSTOLIC AND DIASTOLIC CONGESTIVE HEART FAILURE (HCC): ICD-10-CM

## 2019-12-19 DIAGNOSIS — Z95.1 S/P CABG X 3: ICD-10-CM

## 2019-12-19 DIAGNOSIS — Z00.00 MEDICARE ANNUAL WELLNESS VISIT, SUBSEQUENT: ICD-10-CM

## 2019-12-19 DIAGNOSIS — Z00.00 MEDICARE ANNUAL WELLNESS VISIT, SUBSEQUENT: Primary | ICD-10-CM

## 2019-12-19 DIAGNOSIS — I42.0 DILATED CARDIOMYOPATHY (HCC): ICD-10-CM

## 2019-12-19 DIAGNOSIS — Z86.73 HISTORY OF CVA (CEREBROVASCULAR ACCIDENT) WITHOUT RESIDUAL DEFICITS: ICD-10-CM

## 2019-12-19 DIAGNOSIS — I10 ESSENTIAL HYPERTENSION: ICD-10-CM

## 2019-12-19 DIAGNOSIS — I25.10 CORONARY ARTERY DISEASE INVOLVING NATIVE CORONARY ARTERY OF NATIVE HEART WITHOUT ANGINA PECTORIS: ICD-10-CM

## 2019-12-19 PROCEDURE — 1123F ACP DISCUSS/DSCN MKR DOCD: CPT | Performed by: INTERNAL MEDICINE

## 2019-12-19 PROCEDURE — 3017F COLORECTAL CA SCREEN DOC REV: CPT | Performed by: INTERNAL MEDICINE

## 2019-12-19 PROCEDURE — G8598 ASA/ANTIPLAT THER USED: HCPCS | Performed by: INTERNAL MEDICINE

## 2019-12-19 PROCEDURE — 4040F PNEUMOC VAC/ADMIN/RCVD: CPT | Performed by: INTERNAL MEDICINE

## 2019-12-19 PROCEDURE — G0438 PPPS, INITIAL VISIT: HCPCS | Performed by: INTERNAL MEDICINE

## 2019-12-19 PROCEDURE — 3044F HG A1C LEVEL LT 7.0%: CPT | Performed by: INTERNAL MEDICINE

## 2019-12-19 PROCEDURE — G8484 FLU IMMUNIZE NO ADMIN: HCPCS | Performed by: INTERNAL MEDICINE

## 2019-12-19 RX ORDER — LISINOPRIL 5 MG/1
10 TABLET ORAL DAILY
Qty: 180 TABLET | Refills: 0 | Status: SHIPPED | OUTPATIENT
Start: 2019-12-19

## 2019-12-19 ASSESSMENT — LIFESTYLE VARIABLES
HOW OFTEN DO YOU HAVE SIX OR MORE DRINKS ON ONE OCCASION: 0
AUDIT-C TOTAL SCORE: 1
HOW OFTEN DO YOU HAVE A DRINK CONTAINING ALCOHOL: 1
HOW MANY STANDARD DRINKS CONTAINING ALCOHOL DO YOU HAVE ON A TYPICAL DAY: 0

## 2019-12-19 ASSESSMENT — PATIENT HEALTH QUESTIONNAIRE - PHQ9
SUM OF ALL RESPONSES TO PHQ QUESTIONS 1-9: 0

## 2019-12-20 ENCOUNTER — CLINICAL DOCUMENTATION (OUTPATIENT)
Dept: SPIRITUAL SERVICES | Age: 74
End: 2019-12-20

## 2019-12-20 LAB
A/G RATIO: 1.3 (ref 1.1–2.2)
ALBUMIN SERPL-MCNC: 4.7 G/DL (ref 3.4–5)
ALP BLD-CCNC: 65 U/L (ref 40–129)
ALT SERPL-CCNC: 23 U/L (ref 10–40)
ANION GAP SERPL CALCULATED.3IONS-SCNC: 15 MMOL/L (ref 3–16)
AST SERPL-CCNC: 29 U/L (ref 15–37)
BILIRUB SERPL-MCNC: 0.4 MG/DL (ref 0–1)
BUN BLDV-MCNC: 10 MG/DL (ref 7–20)
CALCIUM SERPL-MCNC: 9.8 MG/DL (ref 8.3–10.6)
CHLORIDE BLD-SCNC: 99 MMOL/L (ref 99–110)
CO2: 23 MMOL/L (ref 21–32)
CREAT SERPL-MCNC: 1 MG/DL (ref 0.8–1.3)
ESTIMATED AVERAGE GLUCOSE: 148.5 MG/DL
GFR AFRICAN AMERICAN: >60
GFR NON-AFRICAN AMERICAN: >60
GLOBULIN: 3.5 G/DL
GLUCOSE BLD-MCNC: 144 MG/DL (ref 70–99)
HBA1C MFR BLD: 6.8 %
POTASSIUM SERPL-SCNC: 4.3 MMOL/L (ref 3.5–5.1)
PROSTATE SPECIFIC ANTIGEN: 0.86 NG/ML (ref 0–4)
SODIUM BLD-SCNC: 137 MMOL/L (ref 136–145)
TOTAL PROTEIN: 8.2 G/DL (ref 6.4–8.2)
TSH REFLEX: 2.64 UIU/ML (ref 0.27–4.2)
URIC ACID, SERUM: 6.8 MG/DL (ref 3.5–7.2)

## 2020-01-03 ENCOUNTER — TELEPHONE (OUTPATIENT)
Dept: CARDIOLOGY CLINIC | Age: 75
End: 2020-01-03

## 2020-01-06 RX ORDER — DIGOXIN 125 MCG
125 TABLET ORAL DAILY
Qty: 90 TABLET | Refills: 3 | Status: SHIPPED | OUTPATIENT
Start: 2020-01-06 | End: 2020-01-06 | Stop reason: SDUPTHER

## 2020-01-06 RX ORDER — DIGOXIN 125 MCG
125 TABLET ORAL DAILY
Qty: 90 TABLET | Refills: 3 | Status: SHIPPED | OUTPATIENT
Start: 2020-01-06

## 2020-01-10 ENCOUNTER — CLINICAL DOCUMENTATION (OUTPATIENT)
Dept: SPIRITUAL SERVICES | Age: 75
End: 2020-01-10

## 2020-01-10 NOTE — PROGRESS NOTES
Outpatient [facility] ([facility]) team member attempted telephone call to patient. There was no answer and voice message was left encouraging patient to contact Outpatient SCS. Contact information for Outpatient SCS provided.

## 2020-01-23 ENCOUNTER — CLINICAL DOCUMENTATION (OUTPATIENT)
Dept: SPIRITUAL SERVICES | Age: 75
End: 2020-01-23

## 2020-02-03 ENCOUNTER — CLINICAL DOCUMENTATION (OUTPATIENT)
Dept: SPIRITUAL SERVICES | Age: 75
End: 2020-02-03

## 2020-02-11 ENCOUNTER — NURSE ONLY (OUTPATIENT)
Dept: CARDIOLOGY CLINIC | Age: 75
End: 2020-02-11
Payer: MEDICARE

## 2020-02-11 PROCEDURE — 93295 DEV INTERROG REMOTE 1/2/MLT: CPT | Performed by: INTERNAL MEDICINE

## 2020-02-11 PROCEDURE — 93296 REM INTERROG EVL PM/IDS: CPT | Performed by: INTERNAL MEDICINE

## 2020-02-11 PROCEDURE — 93297 REM INTERROG DEV EVAL ICPMS: CPT | Performed by: NURSE PRACTITIONER

## 2020-02-12 ENCOUNTER — TELEPHONE (OUTPATIENT)
Dept: CARDIOLOGY CLINIC | Age: 75
End: 2020-02-12

## 2020-02-13 NOTE — TELEPHONE ENCOUNTER
Spoke to patients daughter, [employer]. She will check with patient and call back with symptoms that patient is having. She did say that he is a little SOB and fatigue.

## 2020-02-13 NOTE — TELEPHONE ENCOUNTER
See PACEART report under Cardiology tab.    optivol shows increase In fluid Index over the last month and staying elevated, please call pt. and assess for s/s of chf.

## 2020-02-13 NOTE — PROGRESS NOTES
Carelink transmission shows normal sensing and pacing function. See interrogation for more details. optivol shows increase In fluid Index over the last month and staying elevated, will call pt. No new arrhythmias. BVP 98.4%. Follow up in 3 months via carelink.

## 2020-02-24 ENCOUNTER — PROCEDURE VISIT (OUTPATIENT)
Dept: CARDIOLOGY CLINIC | Age: 75
End: 2020-02-24
Payer: MEDICARE

## 2020-02-24 ENCOUNTER — OFFICE VISIT (OUTPATIENT)
Dept: CARDIOLOGY CLINIC | Age: 75
End: 2020-02-24
Payer: MEDICARE

## 2020-02-24 VITALS
WEIGHT: 141.4 LBS | SYSTOLIC BLOOD PRESSURE: 108 MMHG | OXYGEN SATURATION: 98 % | BODY MASS INDEX: 23.56 KG/M2 | HEIGHT: 65 IN | DIASTOLIC BLOOD PRESSURE: 54 MMHG | HEART RATE: 70 BPM

## 2020-02-24 PROCEDURE — 1036F TOBACCO NON-USER: CPT | Performed by: NURSE PRACTITIONER

## 2020-02-24 PROCEDURE — G8484 FLU IMMUNIZE NO ADMIN: HCPCS | Performed by: NURSE PRACTITIONER

## 2020-02-24 PROCEDURE — 4040F PNEUMOC VAC/ADMIN/RCVD: CPT | Performed by: NURSE PRACTITIONER

## 2020-02-24 PROCEDURE — 93290 INTERROG DEV EVAL ICPMS IP: CPT | Performed by: NURSE PRACTITIONER

## 2020-02-24 PROCEDURE — G8420 CALC BMI NORM PARAMETERS: HCPCS | Performed by: NURSE PRACTITIONER

## 2020-02-24 PROCEDURE — 3017F COLORECTAL CA SCREEN DOC REV: CPT | Performed by: NURSE PRACTITIONER

## 2020-02-24 PROCEDURE — 3046F HEMOGLOBIN A1C LEVEL >9.0%: CPT | Performed by: NURSE PRACTITIONER

## 2020-02-24 PROCEDURE — 1123F ACP DISCUSS/DSCN MKR DOCD: CPT | Performed by: NURSE PRACTITIONER

## 2020-02-24 PROCEDURE — G8427 DOCREV CUR MEDS BY ELIG CLIN: HCPCS | Performed by: NURSE PRACTITIONER

## 2020-02-24 PROCEDURE — 2022F DILAT RTA XM EVC RTNOPTHY: CPT | Performed by: NURSE PRACTITIONER

## 2020-02-24 PROCEDURE — 99214 OFFICE O/P EST MOD 30 MIN: CPT | Performed by: NURSE PRACTITIONER

## 2020-02-24 NOTE — PROGRESS NOTES
[facility]   Cardiac Follow-up    Primary Care Doctor: Susan Melo MD    No chief complaint on file. History of Present Illness:   I had the pleasure of seeing Kendy Varghese in follow up for systolic heart failure. He  has a history of ischemic cardiomyopathy, CAD, CVA, HTN, LBBB, chronic systolic CHF, NSVT, and FARNAZ. He had a CABG in 9/2016. EF was 33%. his EF remained 20% on MUGA in 9/2017. On 11/21/2017 he had a biventricular ICD implanted. Echo 2018 showed LVEF 40%. Since last visit, he is not being active. No chest pain. No tightness. He is sleeping probably too much per wife. Tired all the time. Dyspnea if he is too active. Only activity he is getting is walking from the kitchen to the bathroom to the bedroom. Not going outside at all. Issues with appetite, issues with chewing, nothing tastes good. No energy   Diarrhea at times, comes and goes. Drinks soft drinks and sweet tea during the day, drinking about 2-3 cups a day. No falling, no syncope. No lightheadedness or dizziness. Kendy Varghese describes symptoms including fatigue but denies chest pain, palpitations, edema, syncope. NYHA:   I  ACC/ AHA Stage:    C    Past Medical History:   has a past medical history of Abnormal echocardiogram, Abnormal stress test, Cardiomyopathy ([address] CHF (congestive heart failure) ([address] Diabetes ([address] Hyperlipidemia, and Hypertension. Surgical History:   has a past surgical history that includes Neck surgery; Colonoscopy (07/16/2016); Coronary artery bypass graft (09/16/2016); Upper gastrointestinal endoscopy (05/22/2017); and Cystoscopy (03/2018). Social History:   reports that he has never smoked. He has never used smokeless tobacco. He reports that he does not drink alcohol or use drugs.    Family History:   Family History   Problem Relation Age of Onset    Diabetes Father     Heart Disease Father     Cancer Father     High Blood Pressure Father     Diabetes Sister     Diabetes Brother     Cancer Brother         pancreatic       Home Medications:  Prior to Admission medications    Medication Sig Start Date End Date Taking? Authorizing Provider   digoxin (LANOXIN) 125 MCG tablet Take 1 tablet by mouth daily 1/6/20   AUGUSTO Winkler CNP   lisinopril (PRINIVIL;ZESTRIL) 5 MG tablet Take 2 tablets by mouth daily 12/19/19   Ari Rodriguez MD   finasteride (PROSCAR) 5 MG tablet Take 1 tablet by mouth daily 4/5/19   Ari Rodriguez MD   atorvastatin (LIPITOR) 40 MG tablet Take 1 tablet by mouth nightly 4/5/19   Ari Rodriguez MD   metoprolol succinate (TOPROL XL) 25 MG extended release tablet Take 1 tablet by mouth nightly 4/5/19   Ari Rodriguez MD   pantoprazole (PROTONIX) 40 MG tablet Take 1 tablet by mouth daily 4/5/19   Ari Rodriguez MD   glipiZIDE (GLUCOTROL) 5 MG tablet Take 1 tablet by mouth 2 times daily (before meals) 4/5/19   Ari Rodriguez MD   clopidogrel (PLAVIX) 75 MG tablet Take 1 tablet by mouth daily 4/5/19   Ari Rodriguez MD   aspirin 81 MG tablet Take 81 mg by mouth daily     Historical Provider, MD        Allergies: Iv dye [iodides]; Persantine [dipyridamole]; and Coreg [carvedilol]     Review of Systems:   · Constitutional: there has been no unanticipated weight loss. · Eyes: No vision changes  · ENT: No Headaches, no nasal congestion. No mouth sores or sore throat. + swallowing difficulty  · Cardiovascular: Reviewed in HPI  · Respiratory: No cough or wheezing, no sputum production. · Gastrointestinal: + abdominal pain, no constipation, + diarrhea  · Genitourinary: No dysuria, trouble voiding, or hematuria. · Musculoskeletal:  + weakness or joint complaints. · Integumentary: No rash or pruritis. · Neurological: No numbness or tingling. No weakness. No tremor. · Psychiatric: No anxiety, no depression. · Endocrine:  No excessive thirst or urination.   · Hematologic/Lymphatic: No abnormal bruising or bleeding, blood clots or swollen lymph nodes.     Physical Examination:    Vitals:    02/24/20 0900 02/24/20 0903   BP: (!) 108/54 (!) 108/54   Pulse: 70    SpO2: 98%    Weight: 141 lb 6.4 oz (64.1 kg)    Height: 5' 5\" (1.651 m)        Constitutional and General Appearance: no apparent distress, thin and frail  HEENT: non-icteric sclera, oropharynx without exudate, oral mucosa moist  Neck: JVP less than 8 cm H20  Respiratory:  · No use of accessory muscles  · Clear breath sounds throughout, no wheezing, no crackles, no rhonchi  Cardiovascular:  · The apical impulses not displaced  · no murmur/rub/gallop  · Regular rate and rhythm, S1,S2 normal  · Radial pulses 2+ and equal bilaterally  · No edema  · Pedal Pulses: 2+ and equal   Abdomen:  · No masses or tenderness  · Liver: No Abnormalities Noted  Musculoskeletal/Skin:  · Exhibits normal gait balance and coordination  · There is no clubbing, cyanosis of the extremities  · Skin is warm and dry  · Moves all extremities well  Neurological/Psychiatric:  · Alert and oriented in all spheres  · No abnormalities of mood, affect, memory, mentation, or behavior are noted    Lab Data:  CBC:   Lab Results   Component Value Date    WBC 10.4 04/22/2019    WBC 9.3 03/28/2018    WBC 17.3 03/27/2018    RBC 5.07 04/22/2019    RBC 4.21 03/28/2018    RBC 4.18 03/27/2018    HGB 14.6 04/22/2019    HGB 11.5 03/28/2018    HGB 11.4 03/27/2018    HCT 43.0 04/22/2019    HCT 34.6 03/28/2018    HCT 34.4 03/27/2018    MCV 84.8 04/22/2019    MCV 82.2 03/28/2018    MCV 82.4 03/27/2018    RDW 13.8 04/22/2019    RDW 13.2 03/28/2018    RDW 13.5 03/27/2018     04/22/2019     03/28/2018     03/27/2018     BMP:   Lab Results   Component Value Date     12/19/2019     08/30/2019     04/22/2019    K 4.3 12/19/2019    K 4.6 08/30/2019    K 5.2 04/22/2019    CL 99 12/19/2019     08/30/2019    CL 99 04/22/2019    CO2 23 12/19/2019    CO2 26 08/30/2019 CO2 26 04/22/2019    PHOS 3.1 03/28/2018    PHOS 2.0 03/27/2018    PHOS 3.0 06/28/2017    BUN 10 12/19/2019    BUN 11 08/30/2019    BUN 9 04/22/2019    CREATININE 1.0 12/19/2019    CREATININE 1.2 08/30/2019    CREATININE 1.2 04/22/2019     BNP:   Lab Results   Component Value Date    PROBNP 1,631 03/26/2018       Recent Testing:    Echo 3/1/18   Summary   The left ventricular systolic function is moderately reduced with an   ejection fraction of 40%.  Mild concentric left ventricular hypertrophy.   The apex and apical segments appear hypokinetic.   Left ventricular cavity size is normal.   Grade I diastolic dysfunction with normal filing pressure.   Mild mitral regurgitation.   Systolic pulmonary artery pressure (SPAP) is normal and estimated at 27 mmHg   (RA pressure 3 mmHg).   Pacer / ICD wire is visualized in the right ventricle.   Last echo on 6/28/2017 showed EF 30-35%. MUGA scan on 9/28/17 showed an EF of 20%. STRESS TEST: 8/9/2016     Summary  Small-moderate sized apical, inferoapical, and inferoseptal fixed defects  consistent with infarction in the territory of the mid LAD and/or LCx. There  is severe global LV systolic dysfunction with ejection fraction of 23%. There is asynchronous septal motion which may be due to LBBB and severe HK  of the apex. Higher risk abnormal study. CATH: 09/13/16  ANGIOGRAPHIC FINDINGS:   1. Multivessel critical coronary artery disease with ostial LAD and critical  circ and right coronary artery disease. 2. Reduced left ventricular function with an EF of 20% and global left  ventricular function. 3. Normal left and right heart hemodynamics. BYPASS: 09/16/16  Urgent CABG X 3, with pedicled LIMA to LAD, sequential Greater Saphenous VG to OM 2 then on to PV br of RCA, SGC, CPB, EVH Right Greater Saphenous vein, MOUNIKA, Epiaortic ultrasound, Doppler verification of grafts, Bilateral 5 level intercostal nerve block(Exparel), Platelet gel application.

## 2020-02-24 NOTE — LETTER
[facility]   Cardiac Follow-up    Primary Care Doctor: Susan Melo MD    No chief complaint on file. History of Present Illness:   I had the pleasure of seeing Kendy Varghese in follow up for systolic heart failure. He  has a history of ischemic cardiomyopathy, CAD, CVA, HTN, LBBB, chronic systolic CHF, NSVT, and FARNAZ. He had a CABG in 9/2016. EF was 33%. his EF remained 20% on MUGA in 9/2017. On 11/21/2017 he had a biventricular ICD implanted. Echo 2018 showed LVEF 40%. Since last visit, he is not being active. No chest pain. No tightness. He is sleeping probably too much per wife. Tired all the time. Dyspnea if he is too active. Only activity he is getting is walking from the kitchen to the bathroom to the bedroom. Not going outside at all. Issues with appetite, issues with chewing, nothing tastes good. No energy   Diarrhea at times, comes and goes. Drinks soft drinks and sweet tea during the day, drinking about 2-3 cups a day. No falling, no syncope. No lightheadedness or dizziness. Kendy Varghese describes symptoms including fatigue but denies chest pain, palpitations, edema, syncope. NYHA:   I  ACC/ AHA Stage:    C    Past Medical History:   has a past medical history of Abnormal echocardiogram, Abnormal stress test, Cardiomyopathy ([address] CHF (congestive heart failure) ([address] Diabetes ([address] Hyperlipidemia, and Hypertension. Surgical History:   has a past surgical history that includes Neck surgery; Colonoscopy (07/16/2016); Coronary artery bypass graft (09/16/2016); Upper gastrointestinal endoscopy (05/22/2017); and Cystoscopy (03/2018). Social History:   reports that he has never smoked. He has never used smokeless tobacco. He reports that he does not drink alcohol or use drugs.    Family History:   Family History   Problem Relation Age of Onset    Diabetes Father     Heart Disease Father     Cancer Father     High Blood Pressure Father  Diabetes Sister     Diabetes Brother     Cancer Brother         pancreatic       Home Medications:  Prior to Admission medications    Medication Sig Start Date End Date Taking? Authorizing Provider   digoxin (LANOXIN) 125 MCG tablet Take 1 tablet by mouth daily 1/6/20   AUGUSTO Cevallos CNP   lisinopril (PRINIVIL;ZESTRIL) 5 MG tablet Take 2 tablets by mouth daily 12/19/19   Mandy Hutchison MD   finasteride (PROSCAR) 5 MG tablet Take 1 tablet by mouth daily 4/5/19   Mandy Hutchison MD   atorvastatin (LIPITOR) 40 MG tablet Take 1 tablet by mouth nightly 4/5/19   Mandy Hutchison MD   metoprolol succinate (TOPROL XL) 25 MG extended release tablet Take 1 tablet by mouth nightly 4/5/19   Mandy Hutchison MD   pantoprazole (PROTONIX) 40 MG tablet Take 1 tablet by mouth daily 4/5/19   Mandy Hutchison MD   glipiZIDE (GLUCOTROL) 5 MG tablet Take 1 tablet by mouth 2 times daily (before meals) 4/5/19   Mandy Hutchison MD   clopidogrel (PLAVIX) 75 MG tablet Take 1 tablet by mouth daily 4/5/19   Mandy Hutchison MD   aspirin 81 MG tablet Take 81 mg by mouth daily     Historical Provider, MD        Allergies: Iv dye [iodides]; Persantine [dipyridamole]; and Coreg [carvedilol]     Review of Systems:   · Constitutional: there has been no unanticipated weight loss. · Eyes: No vision changes  · ENT: No Headaches, no nasal congestion. No mouth sores or sore throat. + swallowing difficulty  · Cardiovascular: Reviewed in HPI  · Respiratory: No cough or wheezing, no sputum production. · Gastrointestinal: + abdominal pain, no constipation, + diarrhea  · Genitourinary: No dysuria, trouble voiding, or hematuria. · Musculoskeletal:  + weakness or joint complaints. · Integumentary: No rash or pruritis. · Neurological: No numbness or tingling. No weakness. No tremor. · Psychiatric: No anxiety, no depression. · Endocrine:  No excessive thirst or urination.

## 2020-02-24 NOTE — PATIENT INSTRUCTIONS
1. Check weekly weight  2. Target 64 ounces of fluid a day or 2L a day  3. Check vitamin D level and BMP with next lab draw  4. Stay as active as possible  5. Repeat Echo this next month  6. Follow up with EP in May  7. Follow up 6 months     Your provider has ordered testing for further evaluation. An order/prescription has been included in your paper work.  Central Schedule should contact you within three business days to schedule the test or you can contact Central Scheduling sooner by calling [employer].  If Central scheduling does not contact within three business days, please call to schedule the test by calling [employer]. ([phone])

## 2020-03-18 ENCOUNTER — HOSPITAL ENCOUNTER (OUTPATIENT)
Dept: NON INVASIVE DIAGNOSTICS | Age: 75
Discharge: HOME | End: 2020-03-18
Payer: MEDICARE

## 2020-03-18 LAB
LV EF: 38 %
LVEF MODALITY: NORMAL

## 2020-03-18 PROCEDURE — 93306 TTE W/DOPPLER COMPLETE: CPT

## 2020-03-19 ENCOUNTER — TELEPHONE (OUTPATIENT)
Dept: CARDIOLOGY CLINIC | Age: 75
End: 2020-03-19

## 2020-03-19 NOTE — TELEPHONE ENCOUNTER
Spoke to patients daughter Jade Pepper, relayed message . [facility]. She states patient had a sleep study a few years ago, they told him when he was on his back he would stop breathing, but he was ok on his side. They never told him if he needed f/u or not.

## 2020-03-19 NOTE — TELEPHONE ENCOUNTER
----- Message from AUGUSTO Velasquez CNP sent at 3/18/2020  4:26 PM EDT -----  Heart strength is about the same at 35-40%. Left side of heart wall thickness is normal. Does show normal pressures. Also shows the right heart function has reduced compared to prior which is new for him. Has he ever been tested for sleep apnea?

## 2020-03-20 NOTE — TELEPHONE ENCOUNTER
Covering for Mariella Bauer CNP   Let's make a referral to pulmonary/ sleep center at [address] [place] [address] for evaluation.    AUGUSTO Juan Arm - CNP

## 2020-06-30 NOTE — PROGRESS NOTES
Patient presents to the device clinic today for a programming evaluation for his defibrillator. Patient has a history of DCM. Takes Plavix, Toprol XL, and digoxin. Last device interrogation was on 2/12. Since then, 3 SVT-ST events with the longest and most recent event was on 6/21 x 26 sec. 2 NSVT events with longest and most recent on 5/24 x 2 sec. Optivol is at baseline, however TI is slightly elevated. All sensing and pacing parameters are within normal range. No changes need to be made at this time. Patient education was provided about device functionality, in home monitoring, and any other patient questions and/or concerns were addressed. Patient voices understanding. Please see interrogation for more detail. Patient will see Dr. Ramin Serrato today in office. Patient will follow up in 3 months in office or remotely. See Paceart report under the Cardiology tab.

## 2020-06-30 NOTE — PROGRESS NOTES
[facility]   Cardiac follow up         Primary Provider:  Virginia Welch MD   Reason for follow up:  Cardiomyopathy      HPI:  Zahra Wilder is a 75 y.o. male who was referred by Dr. Dino Boast for evaluation SCA risk. He has CAD and underwent CABG on 9/16/16 for 3 vessel CAD. MRI in 2016 showed an EF of 23%. His Echo on 6/26/17 showed an EF 33%. He wore a cardiac event monitor from 6/29-7/12/17, average heart rate was 79 (), NSVT. MUGA scan on 9/28/17 showed an EF of 20%. He denies dizziness or syncope. He underwent BiV ICD placement 11/2/2017. Today he states that he has been doing well. Patient denies chest pain, sob, palpitations, dizziness or syncope. He does state that he has been more tired than usual.  He has not been very active. He tires walking around in stores quickly. He is working on an [employer] at home. This has been about a 7 year project he states. Device check today shows normal function. 98.4% .  0.3% AP. No AFib noted. VT and NSVT x2 for < 2 sec. Battery life 7 yrs. EKG today shows  80 bpm.      Past Medical History:   has a past medical history of Abnormal echocardiogram, Abnormal stress test, Cardiomyopathy ([address] CHF (congestive heart failure) ([facility] [address] Diabetes ([facility] [address] Hyperlipidemia, and Hypertension. Surgical History:   has a past surgical history that includes Neck surgery; Colonoscopy (07/16/2016); Coronary artery bypass graft (09/16/2016); Upper gastrointestinal endoscopy (05/22/2017); and Cystoscopy (03/2018). Social History:   reports that he has never smoked. He has never used smokeless tobacco. He reports that he does not drink alcohol or use drugs. Family History:  family history includes Cancer in his brother and father; Diabetes in his brother, father, and sister; Heart Disease in his father; High Blood Pressure in his father.       Home Medications:  Outpatient Encounter Medications as of 7/1/2020   Medication Sig Dispense Refill    digoxin (LANOXIN) 125 MCG tablet Take 1 tablet by mouth daily 90 tablet 3    lisinopril (PRINIVIL;ZESTRIL) 5 MG tablet Take 2 tablets by mouth daily 180 tablet 0    finasteride (PROSCAR) 5 MG tablet Take 1 tablet by mouth daily 90 tablet 0    atorvastatin (LIPITOR) 40 MG tablet Take 1 tablet by mouth nightly 90 tablet 3    metoprolol succinate (TOPROL XL) 25 MG extended release tablet Take 1 tablet by mouth nightly 30 tablet 11    pantoprazole (PROTONIX) 40 MG tablet Take 1 tablet by mouth daily 90 tablet 1    glipiZIDE (GLUCOTROL) 5 MG tablet Take 1 tablet by mouth 2 times daily (before meals) 180 tablet 0    clopidogrel (PLAVIX) 75 MG tablet Take 1 tablet by mouth daily 90 tablet 0    aspirin 81 MG tablet Take 81 mg by mouth daily        No facility-administered encounter medications on file as of 7/1/2020. Allergies: Iv dye [iodides]; Persantine [dipyridamole]; and Coreg [carvedilol]     Review of Systems   Constitutional: Negative. HENT: Negative. Eyes: Negative. Respiratory: Negative. Cardiovascular: Negative. Gastrointestinal: Negative. Genitourinary: Negative. Musculoskeletal: Negative. Skin: Negative. Neurological: Negative. Hematological: Negative. Psychiatric/Behavioral: Negative. /66   Pulse 81   Ht 5' 5\" (1.651 m)   Wt 143 lb (64.9 kg)   SpO2 98%   BMI 23.80 kg/m²       Objective:  Physical Exam   Constitutional: He is oriented to person, place, and time. He appears well-developed and well-nourished. HENT:   Head: Normocephalic and atraumatic. Eyes: Pupils are equal, round, and reactive to light. Neck: Normal range of motion. Cardiovascular: Normal rate, regular rhythm and normal heart sounds. Pulmonary/Chest: Effort normal and breath sounds normal.   Abdominal: Soft. No tenderness. Musculoskeletal: Normal range of motion. He exhibits no edema.    Neurological: He is alert and oriented to person, place, and time.   Skin: Skin is warm and dry. Psychiatric: He has a normal mood and affect. Assessment:  1. NICM   2. CRT-D- normal function  2. CHF- NYHA  class 2   3. LBBB- BiV paced QRS now 116 msec    Plan:  1. Remote device checks from home very 3 months   2. Continue current medications. 3. Remain as active as possible. 4. Follow up with me in 1 year. QUALITY MEASURES  1. Tobacco Cessation Counseling: NA  2. Retake of BP if >140/90:   NA  3. Documentation to PCP/referring for new patient:  Sent to PCP at close of office visit  4. CAD patient on anti-platelet: Yes  5. CAD patient on STATIN therapy:  Yes  6. Patient with CHF and aFib on anticoagulation:  NA       This note was scribed in the presence of Kris Alexander MD by Yamil Selby RN.     I, Dr. Kris Alexander, personally performed the services described in this documentation as scribed by Yamil Selby RN in my presence, and it is both accurate and complete.     Kris Alexander M.D.

## 2020-07-01 ENCOUNTER — OFFICE VISIT (OUTPATIENT)
Dept: CARDIOLOGY CLINIC | Age: 75
End: 2020-07-01
Payer: MEDICARE

## 2020-07-01 ENCOUNTER — NURSE ONLY (OUTPATIENT)
Dept: CARDIOLOGY CLINIC | Age: 75
End: 2020-07-01
Payer: MEDICARE

## 2020-07-01 VITALS
HEIGHT: 65 IN | OXYGEN SATURATION: 98 % | WEIGHT: 143 LBS | SYSTOLIC BLOOD PRESSURE: 118 MMHG | HEART RATE: 81 BPM | BODY MASS INDEX: 23.82 KG/M2 | DIASTOLIC BLOOD PRESSURE: 66 MMHG

## 2020-07-01 PROBLEM — I44.7 LBBB (LEFT BUNDLE BRANCH BLOCK): Status: ACTIVE | Noted: 2020-07-01

## 2020-07-01 PROCEDURE — 3017F COLORECTAL CA SCREEN DOC REV: CPT | Performed by: INTERNAL MEDICINE

## 2020-07-01 PROCEDURE — G8427 DOCREV CUR MEDS BY ELIG CLIN: HCPCS | Performed by: INTERNAL MEDICINE

## 2020-07-01 PROCEDURE — 1036F TOBACCO NON-USER: CPT | Performed by: INTERNAL MEDICINE

## 2020-07-01 PROCEDURE — 93000 ELECTROCARDIOGRAM COMPLETE: CPT | Performed by: INTERNAL MEDICINE

## 2020-07-01 PROCEDURE — 4040F PNEUMOC VAC/ADMIN/RCVD: CPT | Performed by: INTERNAL MEDICINE

## 2020-07-01 PROCEDURE — 93290 INTERROG DEV EVAL ICPMS IP: CPT | Performed by: INTERNAL MEDICINE

## 2020-07-01 PROCEDURE — 1123F ACP DISCUSS/DSCN MKR DOCD: CPT | Performed by: INTERNAL MEDICINE

## 2020-07-01 PROCEDURE — G8420 CALC BMI NORM PARAMETERS: HCPCS | Performed by: INTERNAL MEDICINE

## 2020-07-01 PROCEDURE — 99214 OFFICE O/P EST MOD 30 MIN: CPT | Performed by: INTERNAL MEDICINE

## 2020-07-01 PROCEDURE — 93284 PRGRMG EVAL IMPLANTABLE DFB: CPT | Performed by: INTERNAL MEDICINE

## 2020-07-01 NOTE — PATIENT INSTRUCTIONS
1. Remote device checks from home very 3 months   2. Continue current medications. 3. Remain as active as possible. 4. Follow up with me in 1 year.

## 2020-07-14 ENCOUNTER — HOSPITAL ENCOUNTER (OUTPATIENT)
Age: 75
Discharge: HOME | End: 2020-07-14
Payer: MEDICARE

## 2020-07-14 ENCOUNTER — OFFICE VISIT (OUTPATIENT)
Dept: INTERNAL MEDICINE CLINIC | Age: 75
End: 2020-07-14

## 2020-07-14 VITALS
WEIGHT: 138 LBS | HEIGHT: 65 IN | BODY MASS INDEX: 22.99 KG/M2 | HEART RATE: 60 BPM | RESPIRATION RATE: 18 BRPM | DIASTOLIC BLOOD PRESSURE: 75 MMHG | SYSTOLIC BLOOD PRESSURE: 125 MMHG

## 2020-07-14 PROCEDURE — 1036F TOBACCO NON-USER: CPT | Performed by: INTERNAL MEDICINE

## 2020-07-14 PROCEDURE — 80053 COMPREHEN METABOLIC PANEL: CPT

## 2020-07-14 PROCEDURE — 3052F HG A1C>EQUAL 8.0%<EQUAL 9.0%: CPT | Performed by: INTERNAL MEDICINE

## 2020-07-14 PROCEDURE — 83036 HEMOGLOBIN GLYCOSYLATED A1C: CPT

## 2020-07-14 PROCEDURE — 99214 OFFICE O/P EST MOD 30 MIN: CPT | Performed by: INTERNAL MEDICINE

## 2020-07-14 PROCEDURE — 1123F ACP DISCUSS/DSCN MKR DOCD: CPT | Performed by: INTERNAL MEDICINE

## 2020-07-14 PROCEDURE — G8420 CALC BMI NORM PARAMETERS: HCPCS | Performed by: INTERNAL MEDICINE

## 2020-07-14 PROCEDURE — 2022F DILAT RTA XM EVC RTNOPTHY: CPT | Performed by: INTERNAL MEDICINE

## 2020-07-14 PROCEDURE — 36415 COLL VENOUS BLD VENIPUNCTURE: CPT

## 2020-07-14 PROCEDURE — 4040F PNEUMOC VAC/ADMIN/RCVD: CPT | Performed by: INTERNAL MEDICINE

## 2020-07-14 PROCEDURE — G8427 DOCREV CUR MEDS BY ELIG CLIN: HCPCS | Performed by: INTERNAL MEDICINE

## 2020-07-14 PROCEDURE — 3017F COLORECTAL CA SCREEN DOC REV: CPT | Performed by: INTERNAL MEDICINE

## 2020-07-14 NOTE — PROGRESS NOTES
Subjective:      Patient ID: Melvina Moreno is a 75 y.o. male. HPI     75 y.o. male with hx of  DM - 2,  Ischemic cardiomyopathy, s.p CABG, AICD , HTn, CVA , prostate enlargement here for regular f/ w      Known CAD with low EF, urgent CABG in 2016  And did well  had AICD placed in 2018  . Later EF slightly improved  ,  Denies any active chest pain or sob  Remains active at home  No recent pedal edema or sob       Admission to  [facility] 6/17 for right MCA stroke ( mulitple ) with left UE , left facial weakness and dysphagia with slurred speech     Workup with MRI confirmed right MCA multiple strokes.  No Afibb noted in TELE. ECHO with no thrombus  Added plavix to asa , changed to dysphagia diet   No issues over a year     DM- 2   Pt was very non compliant with meds before and now reports taking them regularly   Not checking sugars   Lost some weight    Daughter today reports that metformin is very difficult to swallow due to size and stopped it  Now on glipizide     Denies any chest pain, occasional sob with activity   No pedal edema  No falls  No tingling ,numbness in feet      Current Outpatient Medications   Medication Sig Dispense Refill    digoxin (LANOXIN) 125 MCG tablet Take 1 tablet by mouth daily 90 tablet 3    lisinopril (PRINIVIL;ZESTRIL) 5 MG tablet Take 2 tablets by mouth daily 180 tablet 0    finasteride (PROSCAR) 5 MG tablet Take 1 tablet by mouth daily 90 tablet 0    atorvastatin (LIPITOR) 40 MG tablet Take 1 tablet by mouth nightly 90 tablet 3    metoprolol succinate (TOPROL XL) 25 MG extended release tablet Take 1 tablet by mouth nightly 30 tablet 11    pantoprazole (PROTONIX) 40 MG tablet Take 1 tablet by mouth daily 90 tablet 1    glipiZIDE (GLUCOTROL) 5 MG tablet Take 1 tablet by mouth 2 times daily (before meals) 180 tablet 0    clopidogrel (PLAVIX) 75 MG tablet Take 1 tablet by mouth daily 90 tablet 0    aspirin 81 MG tablet Take 81 mg by mouth daily        No current facility-administered medications for this visit. Review of Systems   Constitutional: Negative for activity change, fatigue and unexpected weight change. HENT:  Negative for ear discharge, hearing loss, postnasal drip and rhinorrhea. Respiratory: Negative for chest tightness and shortness of breath. Cardiovascular: Negative for chest pain and palpitations. Endocrine: Positive for polyuria. Genitourinary: Negative for frequency and hematuria. Musculoskeletal: Negative for neck stiffness. Skin: Negative for color change. Allergic/Immunologic: Negative for immunocompromised state. Neurological: Negative for weakness. Hematological: Negative for adenopathy. Objective:   Physical Exam     Vitals:    07/14/20 1446   BP: 125/75   Pulse: 60   Resp: 18       General:  eldelry male,  Thin built  Awake, alert and oriented. Appears to be not in any distress  Mucous Membranes:  Pink , anicteric  Neck: No JVD, no carotid bruit, no thyromegaly  Chest:  Clear to auscultation bilaterally, no added sounds  Cardiovascular:  RRR S1S2 heard, no murmurs or gallops  Abdomen:  Soft, undistended, non tender, no organomegaly, BS present  Extremities: No edema or cyanosis. Distal pulses well felt  Neurological : grossly normal        MUGA      Abnormal resting left ventricular function with inferior, inferolateral, and    septal wall hypokinesis and EF= 20% .            MRI   Multiple small acute infarcts in right MCA territory.       Slow flow versus occlusion suggested in right middle cerebral artery M3   division.       Remote cerebral and cerebellar infarcts.          Carotid doppler          1.  There is moderate plaque seen in the right internal carotid artery with    an estimated diameter reduction of <50%.    2. There is minimal plaque seen in the left internal carotid artery with an    estimated diameter reduction of <50%.    3. The vertebral arteries are patent with antegrade flow bilaterally.          Swallow eval  Aspiration observed with thin barium and nectar.       Marked anterior endplate spurring at S6-4 likely impairs swallowing.         ECHO 2018      Summary   The left ventricular systolic function is moderately reduced with an   ejection fraction of 40%.  Mild concentric left ventricular hypertrophy.   The apex and apical segments appear hypokinetic.   Left ventricular cavity size is normal.   Grade I diastolic dysfunction with normal filing pressure.   Mild mitral regurgitation.   Systolic pulmonary artery pressure (SPAP) is normal and estimated at 27 mmHg   (RA pressure 3 mmHg).   Pacer / ICD wire is visualized in the right ventricle.   Last echo on 6/28/2017 showed EF 30-35%. ECHO 3/20      Left ventricular systolic function is reduced with ejection fraction   estimated at 35 -40 %. Anteroseptal wall hypokinesis. Left ventricle size is normal.   Normal left ventricular wall thickness. Grade I diastolic dysfunction with normal filling pressure. Mild posterior mitral annular calcification is present. Mild mitral regurgitation. Aortic valve appears sclerotic but opens adequately. Trivial aortic regurgitation is present. Right ventricular systolic function is moderately reduced . Mild tricuspid regurgitation. Normal systolic pulmonary artery pressure (SPAP) estimated at 30 mmHg (RA   pressure 3 mmHg). Lab Results   Component Value Date    LABA1C 6.8 12/19/2019     Lab Results   Component Value Date    .5 12/19/2019       Assessment:       Diagnosis Orders   1. Mixed hyperlipidemia     2. Essential hypertension     3. Esophageal stricture s/p dilatation (June 2017)     4. Dilated cardiomyopathy ([address]     5. 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 Coronary artery disease involving native coronary artery of native heart without angina pectoris     7.  CAD s/p CABGx3 (2016)                 Plan:         DM- 2 - stopped  Metformin as he did not tolerate with abd cramps    Now on glipizide 5 mg daily- not sure if compliant    Advice low carb diet. stop soft drinks Continue same meds  monitor once daily   -last  A1 c at 6.8 - repeat labs  already on CEI and statins  Had eye exam    CAD - multivessel - s/p urgent CABG in 9/16      Continue Toprol xl  -25 mg in am  conitnue   ACEI and digoxin  Continue  statins      Cardiomyopathy - ischemic   well compensated. continue BB, ACEI   No reason for lasix. Improved to 40 % on recent ECHO . S/p AICD      Hx of CVA - right MCA territory   - on ASA,statins    Dysphagia -s.p EGD with dilatation.  No more swallow issues  On ppi       Hyperlipidemia - reports being compliant with statins    Prostate issues- s /p Cysto with no issues    Need fit test  [facility] vaccines at [facility]  Need shingrix

## 2020-07-15 LAB
A/G RATIO: 1.4 (ref 1.1–2.2)
ALBUMIN SERPL-MCNC: 4.2 G/DL (ref 3.4–5)
ALP BLD-CCNC: 55 U/L (ref 40–129)
ALT SERPL-CCNC: 28 U/L (ref 10–40)
ANION GAP SERPL CALCULATED.3IONS-SCNC: 16 MMOL/L (ref 3–16)
AST SERPL-CCNC: 33 U/L (ref 15–37)
BILIRUB SERPL-MCNC: 0.6 MG/DL (ref 0–1)
BUN BLDV-MCNC: 9 MG/DL (ref 7–20)
CALCIUM SERPL-MCNC: 9.5 MG/DL (ref 8.3–10.6)
CHLORIDE BLD-SCNC: 101 MMOL/L (ref 99–110)
CO2: 22 MMOL/L (ref 21–32)
CREAT SERPL-MCNC: 1.3 MG/DL (ref 0.8–1.3)
ESTIMATED AVERAGE GLUCOSE: 200.1 MG/DL
GFR AFRICAN AMERICAN: >60
GFR NON-AFRICAN AMERICAN: 54
GLOBULIN: 3.1 G/DL
GLUCOSE BLD-MCNC: 228 MG/DL (ref 70–99)
HBA1C MFR BLD: 8.6 %
POTASSIUM SERPL-SCNC: 4.1 MMOL/L (ref 3.5–5.1)
SODIUM BLD-SCNC: 139 MMOL/L (ref 136–145)
TOTAL PROTEIN: 7.3 G/DL (ref 6.4–8.2)

## 2020-07-28 ENCOUNTER — TELEPHONE (OUTPATIENT)
Dept: PULMONOLOGY | Age: 75
End: 2020-07-28

## 2020-07-28 NOTE — TELEPHONE ENCOUNTER
Patients daughter Susu Mtz same day cancelled/ did not show for NPT SOB  appointment referred by Leonie Rock with  on 7/28/20. Daughter Sarah stated that patient changed his mind about the appointment and is not experiencing any shortness of breath.       Same Day Cancellation: Yes    Patient rescheduled:  No    New appointment: n/a    Patient was also no show on: n/a

## 2020-09-04 NOTE — LETTER
2020    TELEHEALTH EVALUATION -- Audio/Visual (During NMZEH-55 public health emergency)    HPI:    Anne Yin (:  2012) has requested and consented to an audio/video evaluation for the following concern(s):    Patient is doing fairly well, no asthma exacerbations requiring ER visits or systemic steroid use, the use of rescue medications is very minimal.  Patient is active without any limitations. Review of Systems    Prior to Visit Medications    Medication Sig Taking?  Authorizing Provider   EPINEPHrine (EPIPEN 2-SCOTT) 0.3 MG/0.3ML SOAJ injection Inject 0.3 mLs into the muscle once for 1 dose Inject for signs/symptoms of anaphylaxis Yes Serafin Pagan MD   GRASTEK 2800 BAU SUBL DISSOLVE ONE TABLET UNDER THE TONGUE DAILY Yes Vicki Cervantes MD   montelukast (SINGULAIR) 5 MG chewable tablet CHEW ONE TABLET BY MOUTH EVERY MORNING Yes Vicki Cervantes MD   QVAR REDIHALER 80 MCG/ACT AERB inhaler INHALE TWO PUFFS BY MOUTH TWICE A DAY Yes Vicki Cervantes MD   ketoconazole (NIZORAL) 2 % shampoo Apply topically Yes Historical Provider, MD   EPINEPHrine (EPIPEN 2-SCOTT) 0.3 MG/0.3ML SOAJ injection Inject 0.3 mLs into the muscle once for 1 dose Inject for signs/symptoms of anaphylaxis Yes Vicki Cervantes MD   Respiratory Therapy Supplies ([employer] HOLDING CHAMBER/MASK) TYLER 1 Device by Does not apply route daily Yes Vicki Cervantes MD   albuterol sulfate HFA (VENTOLIN HFA) 108 (90 Base) MCG/ACT inhaler Inhale 2 puffs into the lungs every 6 hours as needed for Wheezing Yes Vicki Cervantes MD   fluticasone (FLOVENT HFA) 220 MCG/ACT inhaler Inhale 2 puffs into the lungs 2 times daily  Patient not taking: Reported on 2020  Vicki Cervantes MD   albuterol (PROVENTIL) (2.5 MG/3ML) 0.083% nebulizer solution Take 3 mLs by nebulization every 6 hours as needed for Wheezing  Patient not taking: Reported on 2019  Vicki Cervantes MD   Respiratory Therapy Supplies (V[employer] HOLDING CHAMBER/MASK) [address]  [address], [address]  Phone: [phone]  Fax: [address] [facility] [address]  [facility] [address]           11/27/17     Dear Cindy Pimentel,    We tried to reach you recently, after your hospital stay, to review your medications. I was unable to reach you on the telephone. We understand that medications can be confusing after a hospital stay. If you are interested in a pharmacist reviewing your medications, please call [phone] option #7. If we do not hear from you after 2 weeks, we will assume you do not have questions or concerns.          Sincerely,     Blossom Fischer, PharmD  [address]  Phone: [phone], option 7 TYLER 1 Device by Does not apply route daily  Patient not taking: Reported on 12/6/2019  Lambert Littlejohn MD   [facility] Nebulizer Set MISC 1 Device by Does not apply route once for 1 dose  Patient not taking: Reported on 12/6/2019  Lambert Littlejohn MD   budesonide (PULMICORT) 0.5 MG/2ML nebulizer suspension Take 2 mLs by nebulization 2 times daily  Patient not taking: Reported on 12/6/2019  Lambert Littlejohn MD   Nebulizers (COMPRESSOR/NEBULIZER) MISC 1 Device by Does not apply route daily  Patient not taking: Reported on 12/6/2019  Lambert Littlejohn MD       Social History     Tobacco Use    Smoking status: Passive Smoke Exposure - Never Smoker    Smokeless tobacco: Never Used    Tobacco comment: outside   Substance Use Topics    Alcohol use: Not on file    Drug use: Not on file            PHYSICAL EXAMINATION:  [ INSTRUCTIONS:  \"[x]\" Indicates a positive item  \"[]\" Indicates a negative item  -- DELETE ALL ITEMS NOT EXAMINED]  Vital Signs: (As obtained by patient/caregiver or practitioner observation)    Blood pressure-  Heart rate-    Respiratory rate-    Temperature-  Pulse oximetry-     Constitutional: [x] Appears well-developed and well-nourished [x] No apparent distress      [] Abnormal-   Mental status  [x] Alert and awake  [x] Oriented to person/place/time [x]Able to follow commands      Eyes:  EOM    [x]  Normal  [] Abnormal-  Sclera  [x]  Normal  [] Abnormal -         Discharge [x]  None visible  [] Abnormal -    HENT:   [x] Normocephalic, atraumatic.   [] Abnormal   [x] Mouth/Throat: Mucous membranes are moist.     External Ears [x] Normal  [] Abnormal-     Neck: [x] No visualized mass     Pulmonary/Chest: [x] Respiratory effort normal.  [x] No visualized signs of difficulty breathing or respiratory distress        [] Abnormal-      Musculoskeletal:   [x] Normal gait with no signs of ataxia         [x] Normal range of motion of neck        [] Abnormal-       Neurological:        [x] No Facial Asymmetry (Cranial nerve 7 motor function) (limited exam to video visit)          [x] No gaze palsy        [] Abnormal-         Skin:        [] No significant exanthematous lesions or discoloration noted on facial skin         [] Abnormal-            Psychiatric:       [x] Normal Affect [] No Hallucinations        [] Abnormal-     Other pertinent observable physical exam findings-     ASSESSMENT/PLAN:  Moderate persistent asthma uncomplicated, seasonal allergic rhinitis, perennial rhinitis, atopic dermatitis, exercise-induced bronchospasm, food allergies, doing well from pulmonary standpoint. Again reviewed asthma action plan based on the symptoms at this time, patient did not do pulmonary function studies and hence is not doing the peak flows. Also gave refills for EpiPen, also wanted to make sure patient has access to rescue inhaler, recommended influenza vaccination during the flu season, will see the patient back in 6 months for follow-up at that time we will consider doing pulmonary function studies. If the patient can do PFT then we can start peak flow monitoring as well. No follow-ups on file. Sissy Kumar is a 5 y.o. male being evaluated by a Virtual Visit (video visit) encounter to address concerns as mentioned above. A caregiver was present when appropriate. Due to this being a TeleHealth encounter (During BJBJP-09 public health emergency), evaluation of the following organ systems was limited: Vitals/Constitutional/EENT/Resp/CV/GI//MS/Neuro/Skin/Heme-Lymph-Imm. Pursuant to the emergency declaration under the [address] [place] [address], [address] [place] [address] authority and the [employer] Act, this Virtual Visit was conducted with patient's (and/or legal guardian's) consent, to reduce the patient's risk of exposure to COVID-19 and provide necessary medical care.   The patient (and/or legal guardian) has also been advised to

## 2020-09-21 ENCOUNTER — NURSE ONLY (OUTPATIENT)
Dept: CARDIOLOGY CLINIC | Age: 75
End: 2020-09-21

## 2020-09-30 ENCOUNTER — NURSE ONLY (OUTPATIENT)
Dept: CARDIOLOGY CLINIC | Age: 75
End: 2020-09-30
Payer: MEDICARE

## 2020-09-30 PROCEDURE — 93296 REM INTERROG EVL PM/IDS: CPT | Performed by: INTERNAL MEDICINE

## 2020-09-30 PROCEDURE — 93297 REM INTERROG DEV EVAL ICPMS: CPT | Performed by: INTERNAL MEDICINE

## 2020-09-30 PROCEDURE — 93295 DEV INTERROG REMOTE 1/2/MLT: CPT | Performed by: INTERNAL MEDICINE

## 2020-09-30 NOTE — LETTER
[address] [facility] [phone]  [address] [facility],[address] [phone]    Pacemaker/Defibrillator Clinic          09/30/20        [address] [place] [id number]        Dear Nevaeh Burns    This letter is to inform you that we received the transmission from your monitor at home that checks your implanted heart device. The next date your monitor will automatically transmit will be 1/4/2021. If your report needs attention we will notify you. Your device and monitor are wireless and most transmit cellularly, but please periodically check your monitor is still plugged in to the electrical outlet. If you still use the telephone land line to send please ensure the connection to the phone samir is secure. This will help to ensure successful automatic transmissions in the future. Also, the monitor needs to be close to you while sleeping at night. Please be aware that the remote device transmission sites are periodically monitored only during regular business hours during which simultaneous in-office device clinics are being run. If your transmission requires attention, we will contact you as soon as possible. Thank you.             [address]

## 2020-09-30 NOTE — PROGRESS NOTES
We received remote transmission from patient's monitor at home. Transmission shows normal sensing and pacing function. EP physician will review. See interrogation under cardiology tab in the [address] [place] [address] [address] field for more details. Thoracic impedance trend stable. Episodes Since: 19-Sep-2020  1 SVT x 30 sec. (toprol  Xl). Follow up in 3 months via carelink.

## 2020-11-17 ENCOUNTER — OFFICE VISIT (OUTPATIENT)
Dept: INTERNAL MEDICINE CLINIC | Age: 75
End: 2020-11-17

## 2020-11-17 VITALS
HEIGHT: 65 IN | WEIGHT: 141 LBS | RESPIRATION RATE: 18 BRPM | BODY MASS INDEX: 23.49 KG/M2 | SYSTOLIC BLOOD PRESSURE: 120 MMHG | DIASTOLIC BLOOD PRESSURE: 70 MMHG | TEMPERATURE: 97.1 F | HEART RATE: 60 BPM

## 2020-11-17 DIAGNOSIS — E11.51 DIABETES MELLITUS TYPE 2 WITH ATHEROSCLEROSIS OF ARTERIES OF EXTREMITIES (HCC): ICD-10-CM

## 2020-11-17 DIAGNOSIS — I70.209 DIABETES MELLITUS TYPE 2 WITH ATHEROSCLEROSIS OF ARTERIES OF EXTREMITIES (HCC): ICD-10-CM

## 2020-11-17 LAB
BASOPHILS ABSOLUTE: 0.1 K/UL (ref 0–0.2)
BASOPHILS RELATIVE PERCENT: 1 %
EOSINOPHILS ABSOLUTE: 0.3 K/UL (ref 0–0.6)
EOSINOPHILS RELATIVE PERCENT: 3.9 %
HCT VFR BLD CALC: 43 % (ref 40.5–52.5)
HEMOGLOBIN: 14.2 G/DL (ref 13.5–17.5)
LYMPHOCYTES ABSOLUTE: 1.9 K/UL (ref 1–5.1)
LYMPHOCYTES RELATIVE PERCENT: 24 %
MCH RBC QN AUTO: 28.2 PG (ref 26–34)
MCHC RBC AUTO-ENTMCNC: 33.1 G/DL (ref 31–36)
MCV RBC AUTO: 85.2 FL (ref 80–100)
MONOCYTES ABSOLUTE: 0.7 K/UL (ref 0–1.3)
MONOCYTES RELATIVE PERCENT: 8.4 %
NEUTROPHILS ABSOLUTE: 4.9 K/UL (ref 1.7–7.7)
NEUTROPHILS RELATIVE PERCENT: 62.7 %
PDW BLD-RTO: 14 % (ref 12.4–15.4)
PLATELET # BLD: 229 K/UL (ref 135–450)
PMV BLD AUTO: 9.3 FL (ref 5–10.5)
RBC # BLD: 5.05 M/UL (ref 4.2–5.9)
WBC # BLD: 7.8 K/UL (ref 4–11)

## 2020-11-17 PROCEDURE — 4040F PNEUMOC VAC/ADMIN/RCVD: CPT | Performed by: INTERNAL MEDICINE

## 2020-11-17 PROCEDURE — G8427 DOCREV CUR MEDS BY ELIG CLIN: HCPCS | Performed by: INTERNAL MEDICINE

## 2020-11-17 PROCEDURE — 1036F TOBACCO NON-USER: CPT | Performed by: INTERNAL MEDICINE

## 2020-11-17 PROCEDURE — G8420 CALC BMI NORM PARAMETERS: HCPCS | Performed by: INTERNAL MEDICINE

## 2020-11-17 PROCEDURE — 3052F HG A1C>EQUAL 8.0%<EQUAL 9.0%: CPT | Performed by: INTERNAL MEDICINE

## 2020-11-17 PROCEDURE — 1123F ACP DISCUSS/DSCN MKR DOCD: CPT | Performed by: INTERNAL MEDICINE

## 2020-11-17 PROCEDURE — G8484 FLU IMMUNIZE NO ADMIN: HCPCS | Performed by: INTERNAL MEDICINE

## 2020-11-17 PROCEDURE — 99213 OFFICE O/P EST LOW 20 MIN: CPT | Performed by: INTERNAL MEDICINE

## 2020-11-17 NOTE — PROGRESS NOTES
Subjective:      Patient ID: Diana Moore is a 67 y.o. male. HPI     67 y.o. male with hx of  DM - 2,  Ischemic cardiomyopathy, s.p CABG, AICD , HTn, CVA , prostate enlargement here for regular f/ w      Known CAD with low EF, urgent CABG in 2016  And did well  had AICD placed in 2018  . Later EF slightly improved  ,  Denies any active chest pain or sob  Remains active at home  No recent pedal edema or sob       Admission to  [facility] 6/17 for right MCA stroke ( mulitple ) with left UE , left facial weakness and dysphagia with slurred speech     Workup with MRI confirmed right MCA multiple strokes.  No Afibb noted in TELE. ECHO with no thrombus  Added plavix to asa , changed to dysphagia diet   No issues over a year     DM- 2   Pt was very non compliant with meds before and now reports taking them regularly   Not checking sugars , A1c at 8.3 recently   Lost some weight    Daughter today reports that metformin is very difficult to swallow due to size and stopped it  Now on glipizide     Denies any chest pain, occasional sob with activity   No pedal edema  No falls  No tingling ,numbness in feet      Current Outpatient Medications   Medication Sig Dispense Refill    digoxin (LANOXIN) 125 MCG tablet Take 1 tablet by mouth daily 90 tablet 3    lisinopril (PRINIVIL;ZESTRIL) 5 MG tablet Take 2 tablets by mouth daily 180 tablet 0    finasteride (PROSCAR) 5 MG tablet Take 1 tablet by mouth daily 90 tablet 0    atorvastatin (LIPITOR) 40 MG tablet Take 1 tablet by mouth nightly 90 tablet 3    metoprolol succinate (TOPROL XL) 25 MG extended release tablet Take 1 tablet by mouth nightly 30 tablet 11    pantoprazole (PROTONIX) 40 MG tablet Take 1 tablet by mouth daily 90 tablet 1    glipiZIDE (GLUCOTROL) 5 MG tablet Take 1 tablet by mouth 2 times daily (before meals) 180 tablet 0    clopidogrel (PLAVIX) 75 MG tablet Take 1 tablet by mouth daily 90 tablet 0    aspirin 81 MG tablet Take 81 mg by mouth daily        No        Swallow eval  Aspiration observed with thin barium and nectar.       Marked anterior endplate spurring at E8-1 likely impairs swallowing.         ECHO 2018      Summary   The left ventricular systolic function is moderately reduced with an   ejection fraction of 40%.  Mild concentric left ventricular hypertrophy.   The apex and apical segments appear hypokinetic.   Left ventricular cavity size is normal.   Grade I diastolic dysfunction with normal filing pressure.   Mild mitral regurgitation.   Systolic pulmonary artery pressure (SPAP) is normal and estimated at 27 mmHg   (RA pressure 3 mmHg).   Pacer / ICD wire is visualized in the right ventricle.   Last echo on 6/28/2017 showed EF 30-35%. ECHO 3/20      Left ventricular systolic function is reduced with ejection fraction   estimated at 35 -40 %. Anteroseptal wall hypokinesis. Left ventricle size is normal.   Normal left ventricular wall thickness. Grade I diastolic dysfunction with normal filling pressure. Mild posterior mitral annular calcification is present. Mild mitral regurgitation. Aortic valve appears sclerotic but opens adequately. Trivial aortic regurgitation is present. Right ventricular systolic function is moderately reduced . Mild tricuspid regurgitation. Normal systolic pulmonary artery pressure (SPAP) estimated at 30 mmHg (RA   pressure 3 mmHg). Lab Results   Component Value Date    LABA1C 8.6 07/14/2020     Lab Results   Component Value Date    .1 07/14/2020     . Wt Readings from Last 3 Encounters:   11/17/20 141 lb (64 kg)   07/14/20 138 lb (62.6 kg)   07/01/20 143 lb (64.9 kg)       Assessment:       Diagnosis Orders   1. Essential hypertension     2. Mixed hyperlipidemia     3. Dilated cardiomyopathy ([address]     4. Coronary artery disease involving native coronary artery of native heart without angina pectoris     5.  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

## 2020-11-18 LAB
ANION GAP SERPL CALCULATED.3IONS-SCNC: 12 MMOL/L (ref 3–16)
BUN BLDV-MCNC: 10 MG/DL (ref 7–20)
CALCIUM SERPL-MCNC: 10.6 MG/DL (ref 8.3–10.6)
CHLORIDE BLD-SCNC: 104 MMOL/L (ref 99–110)
CO2: 27 MMOL/L (ref 21–32)
CREAT SERPL-MCNC: 1 MG/DL (ref 0.8–1.3)
ESTIMATED AVERAGE GLUCOSE: 142.7 MG/DL
GFR AFRICAN AMERICAN: >60
GFR NON-AFRICAN AMERICAN: >60
GLUCOSE BLD-MCNC: 164 MG/DL (ref 70–99)
HBA1C MFR BLD: 6.6 %
POTASSIUM SERPL-SCNC: 4.6 MMOL/L (ref 3.5–5.1)
SODIUM BLD-SCNC: 143 MMOL/L (ref 136–145)

## 2021-01-04 ENCOUNTER — NURSE ONLY (OUTPATIENT)
Dept: CARDIOLOGY CLINIC | Age: 76
End: 2021-01-04
Payer: MEDICARE

## 2021-01-04 DIAGNOSIS — I42.0 DILATED CARDIOMYOPATHY (HCC): ICD-10-CM

## 2021-01-04 DIAGNOSIS — Z95.810 CARDIAC RESYNCHRONIZATION THERAPY DEFIBRILLATOR (CRT-D) IN PLACE: ICD-10-CM

## 2021-01-04 DIAGNOSIS — I50.42 CHRONIC COMBINED SYSTOLIC AND DIASTOLIC CONGESTIVE HEART FAILURE (HCC): ICD-10-CM

## 2021-01-04 PROCEDURE — 93297 REM INTERROG DEV EVAL ICPMS: CPT | Performed by: INTERNAL MEDICINE

## 2021-01-04 PROCEDURE — 93296 REM INTERROG EVL PM/IDS: CPT | Performed by: INTERNAL MEDICINE

## 2021-01-04 PROCEDURE — 93295 DEV INTERROG REMOTE 1/2/MLT: CPT | Performed by: INTERNAL MEDICINE

## 2021-01-04 NOTE — PROGRESS NOTES
We received remote transmission from patient's monitor at home. Transmission shows normal sensing and pacing function. EP physician will review. See interrogation under cardiology tab in the [address] [facility] [address] field for more details. Episodes Since: 30-Sep-2020  1 Non-sustained VT and 2 SVT-ST. (lanoxin, toprol xl, asa, plavix). Thoracic impedance trend stable. Follow up in 3 months via careSocialcam.

## 2021-01-04 NOTE — LETTER
Cris Staples [address] [facility] [phone]  [address] [facility],[address] Floor [phone]    Pacemaker/Defibrillator Clinic          01/04/21        [address] [place] [id number]        Dear Cheree Dakin    This letter is to inform you that we received the transmission from your monitor at home that checks your implanted heart device. The next date your monitor will automatically transmit will be 4/8/2021. If your report needs attention we will notify you. Your device and monitor are wireless and most transmit cellularly, but please periodically check your monitor is still plugged in to the electrical outlet. If you still use the telephone land line to send please ensure the connection to the phone samir is secure. This will help to ensure successful automatic transmissions in the future. Also, the monitor needs to be close to you while sleeping at night. Please be aware that the remote device transmission sites are periodically monitored only during regular business hours during which simultaneous in-office device clinics are being run. If your transmission requires attention, we will contact you as soon as possible. Thank you.             [facility], [place]

## 2021-03-18 ENCOUNTER — HOSPITAL ENCOUNTER (OUTPATIENT)
Age: 76
Discharge: HOME | End: 2021-03-18
Payer: MEDICARE

## 2021-03-18 ENCOUNTER — OFFICE VISIT (OUTPATIENT)
Dept: INTERNAL MEDICINE CLINIC | Age: 76
End: 2021-03-18

## 2021-03-18 ENCOUNTER — HOSPITAL ENCOUNTER (OUTPATIENT)
Dept: GENERAL RADIOLOGY | Age: 76
Discharge: HOME | End: 2021-03-18
Payer: MEDICARE

## 2021-03-18 VITALS — WEIGHT: 137 LBS | HEIGHT: 65 IN | BODY MASS INDEX: 22.82 KG/M2 | TEMPERATURE: 98.5 F

## 2021-03-18 DIAGNOSIS — I10 ESSENTIAL HYPERTENSION: Primary | ICD-10-CM

## 2021-03-18 DIAGNOSIS — E11.51 DIABETES MELLITUS TYPE 2 WITH ATHEROSCLEROSIS OF ARTERIES OF EXTREMITIES (HCC): ICD-10-CM

## 2021-03-18 DIAGNOSIS — I70.209 DIABETES MELLITUS TYPE 2 WITH ATHEROSCLEROSIS OF ARTERIES OF EXTREMITIES (HCC): ICD-10-CM

## 2021-03-18 DIAGNOSIS — K22.2 ESOPHAGEAL STRICTURE: ICD-10-CM

## 2021-03-18 DIAGNOSIS — I42.0 DILATED CARDIOMYOPATHY (HCC): ICD-10-CM

## 2021-03-18 DIAGNOSIS — E78.2 MIXED HYPERLIPIDEMIA: ICD-10-CM

## 2021-03-18 DIAGNOSIS — Z95.1 S/P CABG X 3: ICD-10-CM

## 2021-03-18 DIAGNOSIS — I25.10 CORONARY ARTERY DISEASE INVOLVING NATIVE CORONARY ARTERY OF NATIVE HEART WITHOUT ANGINA PECTORIS: ICD-10-CM

## 2021-03-18 PROCEDURE — G8427 DOCREV CUR MEDS BY ELIG CLIN: HCPCS | Performed by: INTERNAL MEDICINE

## 2021-03-18 PROCEDURE — 4040F PNEUMOC VAC/ADMIN/RCVD: CPT | Performed by: INTERNAL MEDICINE

## 2021-03-18 PROCEDURE — 36415 COLL VENOUS BLD VENIPUNCTURE: CPT

## 2021-03-18 PROCEDURE — 99213 OFFICE O/P EST LOW 20 MIN: CPT | Performed by: INTERNAL MEDICINE

## 2021-03-18 PROCEDURE — G8420 CALC BMI NORM PARAMETERS: HCPCS | Performed by: INTERNAL MEDICINE

## 2021-03-18 PROCEDURE — G8484 FLU IMMUNIZE NO ADMIN: HCPCS | Performed by: INTERNAL MEDICINE

## 2021-03-18 PROCEDURE — 83036 HEMOGLOBIN GLYCOSYLATED A1C: CPT

## 2021-03-18 PROCEDURE — 71045 X-RAY EXAM CHEST 1 VIEW: CPT

## 2021-03-18 PROCEDURE — 1036F TOBACCO NON-USER: CPT | Performed by: INTERNAL MEDICINE

## 2021-03-18 PROCEDURE — 80053 COMPREHEN METABOLIC PANEL: CPT

## 2021-03-18 PROCEDURE — 1123F ACP DISCUSS/DSCN MKR DOCD: CPT | Performed by: INTERNAL MEDICINE

## 2021-03-18 PROCEDURE — 80162 ASSAY OF DIGOXIN TOTAL: CPT

## 2021-03-18 RX ORDER — LEVOFLOXACIN 500 MG/1
500 TABLET, FILM COATED ORAL DAILY
Qty: 7 TABLET | Refills: 0 | Status: SHIPPED | OUTPATIENT
Start: 2021-03-18 | End: 2021-03-25

## 2021-03-18 RX ORDER — GLIPIZIDE 5 MG/1
5 TABLET ORAL
Qty: 180 TABLET | Refills: 0 | Status: SHIPPED | OUTPATIENT
Start: 2021-03-18

## 2021-03-18 ASSESSMENT — PATIENT HEALTH QUESTIONNAIRE - PHQ9
SUM OF ALL RESPONSES TO PHQ QUESTIONS 1-9: 0
SUM OF ALL RESPONSES TO PHQ9 QUESTIONS 1 & 2: 0
1. LITTLE INTEREST OR PLEASURE IN DOING THINGS: 0

## 2021-03-18 NOTE — PROGRESS NOTES
Subjective:      Patient ID: Vernon Sykes is a 67 y.o. male. HPI     67 y.o. male with hx of  DM - 2,  Ischemic cardiomyopathy, s.p CABG, AICD , HTn, CVA , prostate enlargement here for regular f/ w    Parts of this note is copied from my previous notes and checked thoroughly and updated appropriately to reflect today's exam , findings and treatment plan     Since last time, pt family reports he continues to have choking episodes intermittently and recently noted a new cough and some dyspnea   Has some fevers and diarrhea along with all other family members   No falls     Known CAD with low EF, urgent CABG in 2016  And did well  had AICD placed in 2018  . Later EF slightly improved  ,  Denies any active chest pain or sob  Remains active at home  No recent pedal edema or sob       Admission to  [address] [place] [address] 6/17 for right MCA stroke ( mulitple ) with left UE , left facial weakness and dysphagia with slurred speech     Workup with MRI confirmed right MCA multiple strokes.  No Afibb noted in TELE. ECHO with no thrombus  Added plavix to asa , changed to dysphagia diet   No issues over a year     DM- 2   Pt was very non compliant with meds before and now reports taking them regularly   Not checking sugars , A1c at 6.6 recently   Lost some weight    Daughter today reports that metformin is very difficult to swallow due to size and stopped it  Now on glipizide     Denies any chest pain, occasional sob with activity   No pedal edema  No falls  No tingling ,numbness in feet      Current Outpatient Medications   Medication Sig Dispense Refill    digoxin (LANOXIN) 125 MCG tablet Take 1 tablet by mouth daily 90 tablet 3    lisinopril (PRINIVIL;ZESTRIL) 5 MG tablet Take 2 tablets by mouth daily 180 tablet 0    finasteride (PROSCAR) 5 MG tablet Take 1 tablet by mouth daily 90 tablet 0    atorvastatin (LIPITOR) 40 MG tablet Take 1 tablet by mouth nightly 90 tablet 3    metoprolol succinate (TOPROL XL) 25 MG extended release tablet Take 1 tablet by mouth nightly 30 tablet 11    pantoprazole (PROTONIX) 40 MG tablet Take 1 tablet by mouth daily 90 tablet 1    glipiZIDE (GLUCOTROL) 5 MG tablet Take 1 tablet by mouth 2 times daily (before meals) 180 tablet 0    clopidogrel (PLAVIX) 75 MG tablet Take 1 tablet by mouth daily 90 tablet 0    aspirin 81 MG tablet Take 81 mg by mouth daily        No current facility-administered medications for this visit. Review of Systems   Constitutional: Negative for activity change, fatigue and unexpected weight change. HENT:  Negative for ear discharge, hearing loss, postnasal drip and rhinorrhea. Respiratory: Negative for chest tightness and +ve shortness of breath. Cardiovascular: Negative for chest pain and palpitations. Endocrine: Positive for polyuria. Genitourinary: Negative for frequency and hematuria. Musculoskeletal: Negative for neck stiffness. Skin: Negative for color change. Allergic/Immunologic: Negative for immunocompromised state. Neurological: Negative for weakness. Hematological: Negative for adenopathy. Objective:   Physical Exam     Vitals:    03/18/21 1545   Temp: 98.5 °F (36.9 °C)       General:  eldelry male,  Thin built  Awake, alert and oriented. Appears to be not in any distress  Mucous Membranes:  Pink , anicteric  Neck: No JVD, no carotid bruit, no thyromegaly  Chest:  Clear to auscultation bilaterally, no added sounds  Cardiovascular:  RRR S1S2 heard, no murmurs or gallops  Abdomen:  Soft, undistended, non tender, no organomegaly, BS present  Extremities: No edema or cyanosis. Distal pulses well felt  Neurological : grossly normal        MUGA      Abnormal resting left ventricular function with inferior, inferolateral, and    septal wall hypokinesis and EF= 20% .               MRI   Multiple small acute infarcts in right MCA territory.       Slow flow versus occlusion suggested in right middle cerebral artery M3   division.       Remote cerebral and cerebellar infarcts.          Carotid doppler          1. There is moderate plaque seen in the right internal carotid artery with    an estimated diameter reduction of <50%.    2. There is minimal plaque seen in the left internal carotid artery with an    estimated diameter reduction of <50%.    3. The vertebral arteries are patent with antegrade flow bilaterally.          Swallow eval  Aspiration observed with thin barium and nectar.       Marked anterior endplate spurring at L2-8 likely impairs swallowing.         ECHO 2018      Summary   The left ventricular systolic function is moderately reduced with an   ejection fraction of 40%.  Mild concentric left ventricular hypertrophy.   The apex and apical segments appear hypokinetic.   Left ventricular cavity size is normal.   Grade I diastolic dysfunction with normal filing pressure.   Mild mitral regurgitation.   Systolic pulmonary artery pressure (SPAP) is normal and estimated at 27 mmHg   (RA pressure 3 mmHg).   Pacer / ICD wire is visualized in the right ventricle.   Last echo on 6/28/2017 showed EF 30-35%. ECHO 3/20      Left ventricular systolic function is reduced with ejection fraction   estimated at 35 -40 %. Anteroseptal wall hypokinesis. Left ventricle size is normal.   Normal left ventricular wall thickness. Grade I diastolic dysfunction with normal filling pressure. Mild posterior mitral annular calcification is present. Mild mitral regurgitation. Aortic valve appears sclerotic but opens adequately. Trivial aortic regurgitation is present. Right ventricular systolic function is moderately reduced . Mild tricuspid regurgitation. Normal systolic pulmonary artery pressure (SPAP) estimated at 30 mmHg (RA   pressure 3 mmHg). Lab Results   Component Value Date    LABA1C 6.6 11/17/2020     Lab Results   Component Value Date    .7 11/17/2020     .   Wt Readings from Last 3 Encounters:   03/18/21 137 lb (62.1 kg) 11/17/20 141 lb (64 kg)   07/14/20 138 lb (62.6 kg)       Assessment:       Diagnosis Orders   1. Essential hypertension     2. 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 Mixed hyperlipidemia     4. Dilated cardiomyopathy ([facility] [address]     5. Coronary artery disease involving native coronary artery of native heart without angina pectoris     6. Esophageal stricture s/p dilatation (June 2017)     7. CAD s/p CABGx3 (2016)                 Plan:         Dyspnea - no signs of CHF. Possible aspiration pna  Check cxr  Start on levaquin     DM- 2 - stopped  Metformin as he did not tolerate with abd cramps    Now on glipizide 5 mg daily- not sure if compliant    Advice low carb diet. stop soft drinks Continue same meds  monitor once daily   -last  A1 c at 6.6  - repeat labs  already on CEI and statins  Had eye exam    CAD - multivessel - s/p urgent CABG in 9/16      Continue Toprol xl  -25 mg in am  conitnue   ACEI and digoxin  Continue  statins      Cardiomyopathy - ischemic   well compensated. continue BB, ACEI   No reason for lasix. Improved to 40 % on recent ECHO . S/p AICD      Hx of CVA - right MCA territory   - on ASA,statins    Dysphagia -s.p EGD with dilatation.  No more swallow issues  On ppi       Hyperlipidemia - reports being compliant with statins    Prostate issues- s /p Cysto with no issues    Need fit test  [facility]

## 2021-03-19 LAB
A/G RATIO: 1.2 (ref 1.1–2.2)
ALBUMIN SERPL-MCNC: 4.2 G/DL (ref 3.4–5)
ALP BLD-CCNC: 59 U/L (ref 40–129)
ALT SERPL-CCNC: 12 U/L (ref 10–40)
ANION GAP SERPL CALCULATED.3IONS-SCNC: 11 MMOL/L (ref 3–16)
AST SERPL-CCNC: 16 U/L (ref 15–37)
BILIRUB SERPL-MCNC: 0.8 MG/DL (ref 0–1)
BUN BLDV-MCNC: 12 MG/DL (ref 7–20)
CALCIUM SERPL-MCNC: 8.8 MG/DL (ref 8.3–10.6)
CHLORIDE BLD-SCNC: 97 MMOL/L (ref 99–110)
CO2: 26 MMOL/L (ref 21–32)
CREAT SERPL-MCNC: 1.2 MG/DL (ref 0.8–1.3)
DIGOXIN LEVEL: <0.3 NG/ML (ref 0.8–2)
ESTIMATED AVERAGE GLUCOSE: 151.3 MG/DL
GFR AFRICAN AMERICAN: >60
GFR NON-AFRICAN AMERICAN: 59
GLOBULIN: 3.4 G/DL
GLUCOSE BLD-MCNC: 214 MG/DL (ref 70–99)
HBA1C MFR BLD: 6.9 %
POTASSIUM SERPL-SCNC: 4.6 MMOL/L (ref 3.5–5.1)
SODIUM BLD-SCNC: 134 MMOL/L (ref 136–145)
TOTAL PROTEIN: 7.6 G/DL (ref 6.4–8.2)

## 2021-04-08 ENCOUNTER — NURSE ONLY (OUTPATIENT)
Dept: CARDIOLOGY CLINIC | Age: 76
End: 2021-04-08
Payer: MEDICARE

## 2021-04-08 DIAGNOSIS — Z95.810 CARDIAC RESYNCHRONIZATION THERAPY DEFIBRILLATOR (CRT-D) IN PLACE: ICD-10-CM

## 2021-04-08 DIAGNOSIS — I42.0 DILATED CARDIOMYOPATHY (HCC): ICD-10-CM

## 2021-04-08 DIAGNOSIS — I50.42 CHRONIC COMBINED SYSTOLIC AND DIASTOLIC CONGESTIVE HEART FAILURE (HCC): ICD-10-CM

## 2021-04-08 NOTE — PROGRESS NOTES
We received remote transmission from patient's monitor at home. Transmission shows normal sensing and pacing function. EP physician will review. See interrogation under cardiology tab in the [address] [place] [address] [address] field for more details. Pacing (% of Time Since 04-Jan-2021)  Total * 98.4% (MVP Off)  Effective 98.3%. Thoracic impedance trend stable. 1 SVT-ST x 21 sec. (lanoxin, toprol xl). Follow up in 3 months via carelink.

## 2021-04-08 NOTE — LETTER
[address] [facility] [phone]  [address] [facility],4[address] Floor [phone]    Pacemaker/Defibrillator Clinic          04/08/21         [facility] [address]        Dear Ezzie Cushing    This letter is to inform you that we received the transmission from your monitor at home that checks your implanted heart device. The next date your monitor will automatically transmit will be 7/12. If your report needs attention we will notify you. Your device and monitor are wireless and most transmit cellularly, but please periodically check your monitor is still plugged in to the electrical outlet. If you still use the telephone land line to send please ensure the connection to the phone samir is secure. This will help to ensure successful automatic transmissions in the future. Also, the monitor needs to be close to you while sleeping at night. Please be aware that the remote device transmission sites are periodically monitored only during regular business hours during which simultaneous in-office device clinics are being run. If your transmission requires attention, we will contact you as soon as possible. Thank you.             [address]

## 2021-05-12 PROCEDURE — 93297 REM INTERROG DEV EVAL ICPMS: CPT | Performed by: INTERNAL MEDICINE

## 2021-05-12 PROCEDURE — 93295 DEV INTERROG REMOTE 1/2/MLT: CPT | Performed by: INTERNAL MEDICINE

## 2021-05-12 PROCEDURE — 93296 REM INTERROG EVL PM/IDS: CPT | Performed by: INTERNAL MEDICINE

## 2021-07-12 ENCOUNTER — NURSE ONLY (OUTPATIENT)
Dept: CARDIOLOGY CLINIC | Age: 76
End: 2021-07-12
Payer: MEDICARE

## 2021-07-12 ENCOUNTER — OFFICE VISIT (OUTPATIENT)
Dept: CARDIOLOGY CLINIC | Age: 76
End: 2021-07-12

## 2021-07-12 VITALS
DIASTOLIC BLOOD PRESSURE: 64 MMHG | HEART RATE: 99 BPM | BODY MASS INDEX: 22.49 KG/M2 | WEIGHT: 135 LBS | SYSTOLIC BLOOD PRESSURE: 128 MMHG | HEIGHT: 65 IN | OXYGEN SATURATION: 97 %

## 2021-07-12 DIAGNOSIS — I10 ESSENTIAL HYPERTENSION: Chronic | ICD-10-CM

## 2021-07-12 DIAGNOSIS — I44.7 LBBB (LEFT BUNDLE BRANCH BLOCK): Primary | ICD-10-CM

## 2021-07-12 DIAGNOSIS — Z95.810 CARDIAC RESYNCHRONIZATION THERAPY DEFIBRILLATOR (CRT-D) IN PLACE: ICD-10-CM

## 2021-07-12 DIAGNOSIS — I50.42 CHRONIC COMBINED SYSTOLIC AND DIASTOLIC CONGESTIVE HEART FAILURE (HCC): ICD-10-CM

## 2021-07-12 DIAGNOSIS — I63.231 ARTERIAL ISCHEMIC STROKE, ICA, RIGHT, ACUTE (HCC): ICD-10-CM

## 2021-07-12 DIAGNOSIS — I42.0 DILATED CARDIOMYOPATHY (HCC): ICD-10-CM

## 2021-07-12 DIAGNOSIS — Z95.1 S/P CABG X 3: ICD-10-CM

## 2021-07-12 DIAGNOSIS — E78.2 MIXED HYPERLIPIDEMIA: Chronic | ICD-10-CM

## 2021-07-12 PROCEDURE — 1036F TOBACCO NON-USER: CPT | Performed by: INTERNAL MEDICINE

## 2021-07-12 PROCEDURE — 99214 OFFICE O/P EST MOD 30 MIN: CPT | Performed by: INTERNAL MEDICINE

## 2021-07-12 PROCEDURE — G8427 DOCREV CUR MEDS BY ELIG CLIN: HCPCS | Performed by: INTERNAL MEDICINE

## 2021-07-12 PROCEDURE — 4040F PNEUMOC VAC/ADMIN/RCVD: CPT | Performed by: INTERNAL MEDICINE

## 2021-07-12 PROCEDURE — G8420 CALC BMI NORM PARAMETERS: HCPCS | Performed by: INTERNAL MEDICINE

## 2021-07-12 PROCEDURE — 1123F ACP DISCUSS/DSCN MKR DOCD: CPT | Performed by: INTERNAL MEDICINE

## 2021-07-12 NOTE — LETTER
[address] [facility]  Phone: [phone]  Fax: [phone]     London Pires MD     7/14/2021     Dilcia Rivas . [address] [place] [address]     Patient: Devin Gee   MR Number: <[id number]>   YOB: 1945   Date of Visit: 7/12/2021     Dear Dr. Andrea Jordan     Today I saw our mutual patient named above. Below are the relevant portions of my assessment and plan of care. If you have questions, please do not hesitate to call me. I look forward to following Alver Simon along with you. [facility]   Cardiac follow up         Date: 7/12/21  Patient Name: Devni Gee  YOB: 1945    Primary Care Physician: Andrea Jordan MD    CHIEF COMPLAINT:   Chief Complaint   Patient presents with    1 Year Follow Up    Coronary Artery Disease    Hypertension    Hyperlipidemia    Cardiomyopathy    Congestive Heart Failure    Other     CVA    Other     device management         HPI:  Devin Gee is a 67 y.o. male with a oast medical history of CAD s/p CABG x3 on 9/16/16, CHF, HLD, HTN, CM, CVA and NSVT. MRI in 2016 showed an EF of 23%. His Echo on 6/26/17 showed an EF 33%. He wore a cardiac event monitor from 6/29-7/12/17, average heart rate was 79 (), NSVT. MUGA scan on 9/28/17 showed an EF of 20%. He denies dizziness or syncope. He underwent BiV ICD placement 11/2/2017. At his office visit 7/2020, patient stated that he has been more tired than usual.  He has not been very active. He gets fatigued with very little exertion. He is working on an Goal Zero at home. This has been about a 7 year project he stated. Device check 7/2020 demonstrated normal function. 98.4% .  0.3% AP. No AF noted. VT and NSVT x2 for < 2 sec. Today, 7/12/2021, patient's device interrogation demonstrates normal function, AP 2%,  98%, no AF or VT, 5.4 years until RRT.  He reports he is doing well. 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 He reports he has lost several pounds unintentionally and attributes this to being more active than he was in the winter months. He reports he is taking all medications as prescribed and tolerates them well. Denies recent issues with bleeding or bruising. Patient denies current edema, chest pain, sob, palpitations, dizziness or syncope. Past Medical History:   has a past medical history of Abnormal echocardiogram, Abnormal stress test, Cardiomyopathy ([facility] [address] CHF (congestive heart failure) ([facility] [address] Diabetes ([facility] [address] Hyperlipidemia, and Hypertension. Surgical History:   has a past surgical history that includes Neck surgery; Colonoscopy (07/16/2016); Coronary artery bypass graft (09/16/2016); Upper gastrointestinal endoscopy (05/22/2017); and Cystoscopy (03/2018). Social History:   reports that he has never smoked. He has never used smokeless tobacco. He reports that he does not drink alcohol and does not use drugs. Family History:  family history includes Cancer in his brother and father; Diabetes in his brother, father, and sister; Heart Disease in his father; High Blood Pressure in his father.       Home Medications:  Outpatient Encounter Medications as of 7/12/2021   Medication Sig Dispense Refill    glipiZIDE (GLUCOTROL) 5 MG tablet Take 1 tablet by mouth 2 times daily (before meals) 180 tablet 0    digoxin (LANOXIN) 125 MCG tablet Take 1 tablet by mouth daily 90 tablet 3    lisinopril (PRINIVIL;ZESTRIL) 5 MG tablet Take 2 tablets by mouth daily 180 tablet 0    finasteride (PROSCAR) 5 MG tablet Take 1 tablet by mouth daily 90 tablet 0    atorvastatin (LIPITOR) 40 MG tablet Take 1 tablet by mouth nightly 90 tablet 3    metoprolol succinate (TOPROL XL) 25 MG extended release tablet Take 1 tablet by mouth nightly 30 tablet 11    pantoprazole (PROTONIX) 40 MG tablet Take 1 tablet by mouth daily 90 tablet 1    clopidogrel (PLAVIX) 75 MG tablet Take 1 tablet by mouth daily 90 tablet 0    aspirin 81 MG tablet Take 81 mg by mouth daily        No facility-administered encounter medications on file as of 7/12/2021. Allergies: Iv dye [iodides], Persantine [dipyridamole], and Coreg [carvedilol]     Review of Systems   Constitutional: Negative. HENT: Negative. Eyes: Negative. Respiratory: Negative. Cardiovascular: Negative. Gastrointestinal: Negative. Genitourinary: Negative. Musculoskeletal: Negative. Skin: Negative. Neurological: Negative. Hematological: Negative. Psychiatric/Behavioral: Negative. /64   Pulse 99   Ht 5' 5\" (1.651 m)   Wt 135 lb (61.2 kg)   SpO2 97%   BMI 22.47 kg/m²     Data:     ECG 7/12/21: Personally reviewed. All current cardiac medications reviewed and adjusted accordingly  7/12/21    Device interrogation reviewed and adjusted accordingly 7/12/21    ECHO 3/18/2020:    Summary   Left ventricular systolic function is reduced with ejection fraction   estimated at 35 -40 %. Anteroseptal wall hypokinesis. Left ventricle size is normal.   Normal left ventricular wall thickness. Grade I diastolic dysfunction with normal filling pressure. Mild posterior mitral annular calcification is present. Mild mitral regurgitation. Aortic valve appears sclerotic but opens adequately. Trivial aortic regurgitation is present. Right ventricular systolic function is moderately reduced . Mild tricuspid regurgitation. Normal systolic pulmonary artery pressure (SPAP) estimated at 30 mmHg (RA   pressure 3 mmHg). Objective:  Physical Exam   Constitutional: He is oriented to person, place, and time. He appears well-developed and well-nourished. HENT:   Head: Normocephalic and atraumatic. Eyes: Pupils are equal, round, and reactive to light. Neck: Normal range of motion.    Cardiovascular: Normal rate, regular rhythm and normal heart sounds. Pulmonary/Chest: Effort normal and breath sounds normal.   Abdominal: Soft. No tenderness. Musculoskeletal: Normal range of motion. He exhibits no edema. Neurological: He is alert and oriented to person, place, and time. Skin: Skin is warm and dry. Psychiatric: He has a normal mood and affect. Assessment:  1. ICM- most recent echo 12/2020, LVEF improved to 35-40%. 2. CRT-D-  Placed 11/2017. Normal function. 2. CHF- NYHA  class 2- Follows with CHF team.  3. LBBB- BiV paced QRS now 116 msec. Plan:  1. Continue cardiac medications as prescribed. 2. Continue activity as tolerated. 3. Remote device interrogations every 3 months. 4. Follow up with EP NP in 1 year. QUALITY MEASURES  1. Tobacco Cessation Counseling: NA  2. Retake of BP if >140/90:   NA  3. Documentation to PCP/referring for new patient:  Sent to PCP at close of office visit  4. CAD patient on anti-platelet: Yes  5. CAD patient on STATIN therapy:  Yes  6. Patient with CHF and aFib on anticoagulation:  NA     This note has been scribed in the presence of Jose Griffin MD, by Kacy Gerene RN.       I, Dr. Jose Griffin, personally performed the services described in this documentation as scribed by Kacy Greene RN  in my presence, and it is both accurate and complete. Jose Griffin M.D.                 Sincerely,      Jose Griffin MD

## 2021-07-12 NOTE — PATIENT INSTRUCTIONS
Plan:  1. Continue cardiac medications as prescribed. 2. Continue activity as tolerated. 3. Remote device interrogations every 3 months. 4. Follow up with EP NP in 1 year.

## 2021-07-12 NOTE — PROGRESS NOTES
[facility]   Cardiac follow up         Date: 7/12/21  Patient Name: Roger Verdin  YOB: 1945    Primary Care Physician: Connie Murphy MD    CHIEF COMPLAINT:   Chief Complaint   Patient presents with    1 Year Follow Up    Coronary Artery Disease    Hypertension    Hyperlipidemia    Cardiomyopathy    Congestive Heart Failure    Other     CVA    Other     device management         HPI:  Roger Verdin is a 67 y.o. male with a oast medical history of CAD s/p CABG x3 on 9/16/16, CHF, HLD, HTN, CM, CVA and NSVT. MRI in 2016 showed an EF of 23%. His Echo on 6/26/17 showed an EF 33%. He wore a cardiac event monitor from 6/29-7/12/17, average heart rate was 79 (), NSVT. MUGA scan on 9/28/17 showed an EF of 20%. He denies dizziness or syncope. He underwent BiV ICD placement 11/2/2017. At his office visit 7/2020, patient stated that he has been more tired than usual.  He has not been very active. He gets fatigued with very little exertion. He is working on an Pathflow at home. This has been about a 7 year project he stated. Device check 7/2020 demonstrated normal function. 98.4% .  0.3% AP. No AF noted. VT and NSVT x2 for < 2 sec. Today, 7/12/2021, patient's device interrogation demonstrates normal function, AP 2%,  98%, no AF or VT, 5.4 years until RRT. He reports he is doing well. 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 He reports he has lost several pounds unintentionally and attributes this to being more active than he was in the winter months. He reports he is taking all medications as prescribed and tolerates them well. Denies recent issues with bleeding or bruising. Patient denies current edema, chest pain, sob, palpitations, dizziness or syncope.      Past Medical History:   has a past medical history of Abnormal echocardiogram, Abnormal stress test, Cardiomyopathy ([address] CHF (congestive heart failure) ([facility] Diabetes ([facility] Hyperlipidemia, and Hypertension. Surgical History:   has a past surgical history that includes Neck surgery; Colonoscopy (07/16/2016); Coronary artery bypass graft (09/16/2016); Upper gastrointestinal endoscopy (05/22/2017); and Cystoscopy (03/2018). Social History:   reports that he has never smoked. He has never used smokeless tobacco. He reports that he does not drink alcohol and does not use drugs. Family History:  family history includes Cancer in his brother and father; Diabetes in his brother, father, and sister; Heart Disease in his father; High Blood Pressure in his father. Home Medications:  Outpatient Encounter Medications as of 7/12/2021   Medication Sig Dispense Refill    glipiZIDE (GLUCOTROL) 5 MG tablet Take 1 tablet by mouth 2 times daily (before meals) 180 tablet 0    digoxin (LANOXIN) 125 MCG tablet Take 1 tablet by mouth daily 90 tablet 3    lisinopril (PRINIVIL;ZESTRIL) 5 MG tablet Take 2 tablets by mouth daily 180 tablet 0    finasteride (PROSCAR) 5 MG tablet Take 1 tablet by mouth daily 90 tablet 0    atorvastatin (LIPITOR) 40 MG tablet Take 1 tablet by mouth nightly 90 tablet 3    metoprolol succinate (TOPROL XL) 25 MG extended release tablet Take 1 tablet by mouth nightly 30 tablet 11    pantoprazole (PROTONIX) 40 MG tablet Take 1 tablet by mouth daily 90 tablet 1    clopidogrel (PLAVIX) 75 MG tablet Take 1 tablet by mouth daily 90 tablet 0    aspirin 81 MG tablet Take 81 mg by mouth daily        No facility-administered encounter medications on file as of 7/12/2021. Allergies: Iv dye [iodides], Persantine [dipyridamole], and Coreg [carvedilol]     Review of Systems   Constitutional: Negative. HENT: Negative. Eyes: Negative. Respiratory: Negative. Cardiovascular: Negative. Gastrointestinal: Negative. Genitourinary: Negative. Musculoskeletal: Negative. Skin: Negative.     Neurological: Negative. Hematological: Negative. Psychiatric/Behavioral: Negative. /64   Pulse 99   Ht 5' 5\" (1.651 m)   Wt 135 lb (61.2 kg)   SpO2 97%   BMI 22.47 kg/m²     Data:     ECG 7/12/21: Personally reviewed. All current cardiac medications reviewed and adjusted accordingly  7/12/21    Device interrogation reviewed and adjusted accordingly 7/12/21    ECHO 3/18/2020:    Summary   Left ventricular systolic function is reduced with ejection fraction   estimated at 35 -40 %. Anteroseptal wall hypokinesis. Left ventricle size is normal.   Normal left ventricular wall thickness. Grade I diastolic dysfunction with normal filling pressure. Mild posterior mitral annular calcification is present. Mild mitral regurgitation. Aortic valve appears sclerotic but opens adequately. Trivial aortic regurgitation is present. Right ventricular systolic function is moderately reduced . Mild tricuspid regurgitation. Normal systolic pulmonary artery pressure (SPAP) estimated at 30 mmHg (RA   pressure 3 mmHg). Objective:  Physical Exam   Constitutional: He is oriented to person, place, and time. He appears well-developed and well-nourished. HENT:   Head: Normocephalic and atraumatic. Eyes: Pupils are equal, round, and reactive to light. Neck: Normal range of motion. Cardiovascular: Normal rate, regular rhythm and normal heart sounds. Pulmonary/Chest: Effort normal and breath sounds normal.   Abdominal: Soft. No tenderness. Musculoskeletal: Normal range of motion. He exhibits no edema. Neurological: He is alert and oriented to person, place, and time. Skin: Skin is warm and dry. Psychiatric: He has a normal mood and affect. Assessment:  1. ICM- most recent echo 12/2020, LVEF improved to 35-40%. 2. CRT-D-  Placed 11/2017. Normal function. 2. CHF- NYHA  class 2- Follows with CHF team.  3. LBBB- BiV paced QRS now 116 msec. Plan:  1. Continue cardiac medications as prescribed. 2. Continue activity as tolerated. 3. Remote device interrogations every 3 months. 4. Follow up with EP NP in 1 year. QUALITY MEASURES  1. Tobacco Cessation Counseling: NA  2. Retake of BP if >140/90:   NA  3. Documentation to PCP/referring for new patient:  Sent to PCP at close of office visit  4. CAD patient on anti-platelet: Yes  5. CAD patient on STATIN therapy:  Yes  6. Patient with CHF and aFib on anticoagulation:  NA     This note has been scribed in the presence of Sebastian Salmeron MD, by Chapo Smith RN.       I, Dr. Sebastian Salmeron, personally performed the services described in this documentation as scribed by Chapo Smith RN  in my presence, and it is both accurate and complete.       Sebastian Salmeron M.D.

## 2021-07-12 NOTE — PROGRESS NOTES
Patient presents to the device clinic today for a programming evaluation for his defibrillator. Patient has a history of DCM and stroke. Takes Plavix, Digoxin, and Toprol XL. Last device interrogation was on 4/8. Since then, 1 SVT-ST event recorded on 6/8 x 15 seconds w/ V rate 158 bpm. TI near baseline. AP 2.1%  98.4% Effective 98.3%    All sensing and pacing parameters are within normal range. No changes need to be made at this time. Patient education was provided about device functionality, in home monitoring, and any other patient questions and/or concerns were addressed. Patient voices understanding. Please see interrogation for more detail. Patient will see Dr. Nik Pineda today in office. Patient will follow up in 3 months in office or remotely. See Paceart report under the Cardiology tab.

## 2021-07-30 PROCEDURE — 93284 PRGRMG EVAL IMPLANTABLE DFB: CPT | Performed by: INTERNAL MEDICINE

## 2021-10-11 ENCOUNTER — NURSE ONLY (OUTPATIENT)
Dept: CARDIOLOGY CLINIC | Age: 76
End: 2021-10-11
Payer: MEDICARE

## 2021-10-11 DIAGNOSIS — I42.0 DILATED CARDIOMYOPATHY (HCC): ICD-10-CM

## 2021-10-11 DIAGNOSIS — I50.42 CHRONIC COMBINED SYSTOLIC AND DIASTOLIC CONGESTIVE HEART FAILURE (HCC): ICD-10-CM

## 2021-10-11 DIAGNOSIS — Z95.810 CARDIAC RESYNCHRONIZATION THERAPY DEFIBRILLATOR (CRT-D) IN PLACE: ICD-10-CM

## 2021-10-11 PROCEDURE — 93295 DEV INTERROG REMOTE 1/2/MLT: CPT | Performed by: INTERNAL MEDICINE

## 2021-10-11 PROCEDURE — 93296 REM INTERROG EVL PM/IDS: CPT | Performed by: INTERNAL MEDICINE

## 2021-10-11 PROCEDURE — 93297 REM INTERROG DEV EVAL ICPMS: CPT | Performed by: INTERNAL MEDICINE

## 2021-10-12 NOTE — PROGRESS NOTES
Remote transmission received for patients CRT-D. Transmission shows normal sensing and pacing function. EP physician will review. See interrogation under the cardiology tab in the [address] [place] [address] [address] field for more details. Will continue to monitor remotely. Pacing (% of Time Since 27-Aug-2021)  Total * 98.4% (MVP Off)  Effective 98.3%  Thoracic impedance trend stable. No  arrhythmias recorded.

## 2021-10-26 ENCOUNTER — OFFICE VISIT (OUTPATIENT)
Dept: INTERNAL MEDICINE CLINIC | Age: 76
End: 2021-10-26

## 2021-10-26 VITALS
HEART RATE: 62 BPM | HEIGHT: 65 IN | WEIGHT: 138 LBS | RESPIRATION RATE: 18 BRPM | DIASTOLIC BLOOD PRESSURE: 75 MMHG | BODY MASS INDEX: 22.99 KG/M2 | SYSTOLIC BLOOD PRESSURE: 130 MMHG

## 2021-10-26 DIAGNOSIS — I50.42 CHRONIC COMBINED SYSTOLIC AND DIASTOLIC CONGESTIVE HEART FAILURE (HCC): ICD-10-CM

## 2021-10-26 DIAGNOSIS — I25.10 CORONARY ARTERY DISEASE INVOLVING NATIVE CORONARY ARTERY OF NATIVE HEART WITHOUT ANGINA PECTORIS: ICD-10-CM

## 2021-10-26 DIAGNOSIS — Z00.00 MEDICARE ANNUAL WELLNESS VISIT, SUBSEQUENT: ICD-10-CM

## 2021-10-26 DIAGNOSIS — E78.2 MIXED HYPERLIPIDEMIA: ICD-10-CM

## 2021-10-26 DIAGNOSIS — I42.0 DILATED CARDIOMYOPATHY (HCC): ICD-10-CM

## 2021-10-26 DIAGNOSIS — E11.51 DIABETES MELLITUS TYPE 2 WITH ATHEROSCLEROSIS OF ARTERIES OF EXTREMITIES (HCC): ICD-10-CM

## 2021-10-26 DIAGNOSIS — Z13.220 SCREENING FOR HYPERLIPIDEMIA: ICD-10-CM

## 2021-10-26 DIAGNOSIS — I10 ESSENTIAL HYPERTENSION: ICD-10-CM

## 2021-10-26 DIAGNOSIS — Z95.1 S/P CABG X 3: ICD-10-CM

## 2021-10-26 DIAGNOSIS — K22.2 ESOPHAGEAL STRICTURE: ICD-10-CM

## 2021-10-26 DIAGNOSIS — Z00.00 MEDICARE ANNUAL WELLNESS VISIT, SUBSEQUENT: Primary | ICD-10-CM

## 2021-10-26 DIAGNOSIS — I70.209 DIABETES MELLITUS TYPE 2 WITH ATHEROSCLEROSIS OF ARTERIES OF EXTREMITIES (HCC): ICD-10-CM

## 2021-10-26 DIAGNOSIS — Z00.00 ROUTINE GENERAL MEDICAL EXAMINATION AT A HEALTH CARE FACILITY: ICD-10-CM

## 2021-10-26 PROCEDURE — G8484 FLU IMMUNIZE NO ADMIN: HCPCS | Performed by: INTERNAL MEDICINE

## 2021-10-26 PROCEDURE — 4040F PNEUMOC VAC/ADMIN/RCVD: CPT | Performed by: INTERNAL MEDICINE

## 2021-10-26 PROCEDURE — 1123F ACP DISCUSS/DSCN MKR DOCD: CPT | Performed by: INTERNAL MEDICINE

## 2021-10-26 PROCEDURE — 81002 URINALYSIS NONAUTO W/O SCOPE: CPT | Performed by: INTERNAL MEDICINE

## 2021-10-26 PROCEDURE — G0439 PPPS, SUBSEQ VISIT: HCPCS | Performed by: INTERNAL MEDICINE

## 2021-10-26 ASSESSMENT — PATIENT HEALTH QUESTIONNAIRE - PHQ9
SUM OF ALL RESPONSES TO PHQ9 QUESTIONS 1 & 2: 0
2. FEELING DOWN, DEPRESSED OR HOPELESS: 0
SUM OF ALL RESPONSES TO PHQ QUESTIONS 1-9: 0
1. LITTLE INTEREST OR PLEASURE IN DOING THINGS: 0
SUM OF ALL RESPONSES TO PHQ QUESTIONS 1-9: 0
SUM OF ALL RESPONSES TO PHQ QUESTIONS 1-9: 0
SUM OF ALL RESPONSES TO PHQ9 QUESTIONS 1 & 2: 0
SUM OF ALL RESPONSES TO PHQ QUESTIONS 1-9: 0
SUM OF ALL RESPONSES TO PHQ QUESTIONS 1-9: 0
1. LITTLE INTEREST OR PLEASURE IN DOING THINGS: 0
SUM OF ALL RESPONSES TO PHQ QUESTIONS 1-9: 0
2. FEELING DOWN, DEPRESSED OR HOPELESS: 0

## 2021-10-26 ASSESSMENT — LIFESTYLE VARIABLES: HOW OFTEN DO YOU HAVE A DRINK CONTAINING ALCOHOL: 0

## 2021-10-26 NOTE — PROGRESS NOTES
Medicare Annual Wellness Visit  Name: Heather Files Date: 10/26/2021   MRN: [id number] Sex: Male   Age: 67 y.o. Ethnicity: Non- / Non    : 1945 Race: White (non-)      Karina Pierce is here for Medicare AWV    Screenings for behavioral, psychosocial and functional/safety risks, and cognitive dysfunction are all negative except as indicated below. These results, as well as other patient data from the [address] form, are documented in Flowsheets linked to this Encounter. 67 y.o. male with hx of  DM - 2,  Ischemic cardiomyopathy, s.p CABG, AICD , HTn, CVA , prostate enlargement here for annual exam       Since last time, pt reports he continues to do well with no new complaints   Still with occasional dysphagia but being cautious    Reports he plays video games a lot     Hx of CAD with ischemic CM, low EF, s.p CABG in 2016    had AICD placed in 2018  . EF slightly improved on follow up echo   Currently remains compliant with meds and fluid intake   Denies any active chest pain or sob  Remains active at home  No recent pedal edema        Admission to  [facility]  for right MCA stroke ( mulitple ) with left UE , left facial weakness and dysphagia with slurred speech     Workup with MRI confirmed right MCA multiple strokes.  No Afibb noted in TELE. ECHO with no thrombus  Added plavix to asa , changed to dysphagia diet   No issues since then      DM- 2   Compliant with meds but does not monitor sugars  Does not follow diabetic diet  , A1c at 6.9 recently   Off metformin for swallowing issues and Now on glipizide   Has seen EYE MD  No falls  No tingling ,numbness in feet    Independent of ADL , IADL   No depression   Lives with wife      Parts of this note is copied from my previous notes and checked thoroughly and updated appropriately to reflect today's exam , findings and treatment selam      Allergies   Allergen Reactions    Iv Dye [Iodides] Anaphylaxis    Persantine [Dipyridamole]     Coreg [Carvedilol] Nausea And Vomiting         Prior to Visit Medications    Medication Sig Taking?  Authorizing Provider   glipiZIDE (GLUCOTROL) 5 MG tablet Take 1 tablet by mouth 2 times daily (before meals)  Andrés Waldron MD   digoxin (LANOXIN) 125 MCG tablet Take 1 tablet by mouth daily  AUGUSTO Isbell CNP   lisinopril (PRINIVIL;ZESTRIL) 5 MG tablet Take 2 tablets by mouth daily  Andrés Waldron MD   finasteride (PROSCAR) 5 MG tablet Take 1 tablet by mouth daily  Andrés Waldron MD   atorvastatin (LIPITOR) 40 MG tablet Take 1 tablet by mouth nightly  Andrés Waldron MD   metoprolol succinate (TOPROL XL) 25 MG extended release tablet Take 1 tablet by mouth nightly  Andrés Waldron MD   pantoprazole (PROTONIX) 40 MG tablet Take 1 tablet by mouth daily  Andrés Waldron MD   clopidogrel (PLAVIX) 75 MG tablet Take 1 tablet by mouth daily  Andrés Waldron MD   aspirin 81 MG tablet Take 81 mg by mouth daily   Historical Provider, MD         Past Medical History:   Diagnosis Date    Abnormal echocardiogram 09/2016    Abnormal stress test 09/2016    Cardiomyopathy ([facility] 09/2016    EF 23%    CHF (congestive heart failure) ([facility]     Diabetes ([facility]     Hyperlipidemia     Hypertension        Past Surgical History:   Procedure Laterality Date    COLONOSCOPY  07/16/2016    transverse polyp    CORONARY ARTERY BYPASS GRAFT  09/16/2016    cabg x 3    CYSTOSCOPY  03/2018    NECK SURGERY      plate in neck     UPPER GASTROINTESTINAL ENDOSCOPY  05/22/2017    esophageal stricture         Family History   Problem Relation Age of Onset    Diabetes Father     Heart Disease Father     Cancer Father     High Blood Pressure Father     Diabetes Sister     Diabetes Brother     Cancer Brother         pancreatic       CareTeam (Including outside providers/suppliers regularly involved in providing care):   Patient Care Team:  Andrés Waldron MD as PCP - General  Stephan Jones MD as PCP - [facility]    Wt Readings from Last 3 Encounters:   10/26/21 138 lb (62.6 kg)   07/12/21 135 lb (61.2 kg)   03/18/21 137 lb (62.1 kg)     Vitals:    10/26/21 1550   BP: 130/75   Pulse: 62   Resp: 18   Weight: 138 lb (62.6 kg)   Height: 5' 5\" (1.651 m)     Body mass index is 22.96 kg/m². Based upon direct observation of the patient, evaluation of cognition reveals recent and remote memory intact. General:  eldelry male,  Thin built  Awake, alert and oriented. Appears to be not in any distress  Mucous Membranes:  Pink , anicteric  Neck: No JVD, no carotid bruit, no thyromegaly  Chest:  Clear to auscultation bilaterally, no added sounds, left AICD   Cardiovascular:  RRR S1S2 heard, no murmurs or gallops  Abdomen:  Soft, undistended, non tender, no organomegaly, BS present  Extremities: No edema or cyanosis. Distal pulses well felt  Neurological : grossly normal  CN 2 to 12 intact, coordination normal  Hearing def noted  Non focal       MUGA      Abnormal resting left ventricular function with inferior, inferolateral, and    septal wall hypokinesis and EF= 20% .            MRI   Multiple small acute infarcts in right MCA territory.       Slow flow versus occlusion suggested in right middle cerebral artery M3   division.       Remote cerebral and cerebellar infarcts.          Carotid doppler          1.  There is moderate plaque seen in the right internal carotid artery with    an estimated diameter reduction of <50%.    2. There is minimal plaque seen in the left internal carotid artery with an    estimated diameter reduction of <50%.    3. The vertebral arteries are patent with antegrade flow bilaterally.          Swallow eval  Aspiration observed with thin barium and nectar.       Marked anterior endplate spurring at T4-4 likely impairs swallowing.         ECHO 2018      Summary   The left ventricular systolic function is moderately reduced with an   ejection fraction of 40%.  Mild concentric left ventricular hypertrophy.   The apex and apical segments appear hypokinetic.   Left ventricular cavity size is normal.   Grade I diastolic dysfunction with normal filing pressure.   Mild mitral regurgitation.   Systolic pulmonary artery pressure (SPAP) is normal and estimated at 27 mmHg   (RA pressure 3 mmHg).   Pacer / ICD wire is visualized in the right ventricle.   Last echo on 6/28/2017 showed EF 30-35%. ECHO 3/20      Left ventricular systolic function is reduced with ejection fraction   estimated at 35 -40 %. Anteroseptal wall hypokinesis. Left ventricle size is normal.   Normal left ventricular wall thickness. Grade I diastolic dysfunction with normal filling pressure. Mild posterior mitral annular calcification is present. Mild mitral regurgitation. Aortic valve appears sclerotic but opens adequately. Trivial aortic regurgitation is present. Right ventricular systolic function is moderately reduced . Mild tricuspid regurgitation. Normal systolic pulmonary artery pressure (SPAP) estimated at 30 mmHg (RA   pressure 3 mmHg). Lab Results   Component Value Date    LABA1C 6.9 03/18/2021     Lab Results   Component Value Date    .3 03/18/2021           Assessment:       Diagnosis Orders   1. Medicare annual wellness visit, subsequent  PSA, Prostatic Specific Antigen   2. Essential hypertension  TSH with Reflex    URIC ACID   3. 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 Mixed hyperlipidemia     5. Coronary artery disease involving native coronary artery of native heart without angina pectoris  TSH with Reflex   6. Esophageal stricture s/p dilatation (June 2017)     7. CAD s/p CABGx3 (2016)  TSH with Reflex    DIGOXIN LEVEL   8.  Chronic combined Directive ACP-Power of     Not on File Not on File Not on File Not on File      General Health Risk Interventions:  · No Living Will: Advance Care Planning addressed with patient today    Health Habits/Nutrition:  Health Habits/Nutrition  Do you exercise for at least 20 minutes 2-3 times per week?: (!) No  Have you lost any weight without trying in the past 3 months?: No  Do you eat only one meal per day?: No  Have you seen the dentist within the past year?: (!) No  Body mass index: 22.96  Health Habits/Nutrition Interventions:  · Dental exam overdue:  patient encouraged to make appointment with his/her dentist    Hearing/Vision:  No exam data present  Hearing/Vision  Do you or your family notice any trouble with your hearing that hasn't been managed with hearing aids?: (!) Yes  Do you have difficulty driving, watching TV, or doing any of your daily activities because of your eyesight?: No  Have you had an eye exam within the past year?: Yes  Hearing/Vision Interventions:  · Hearing concerns:  patient declines any further evaluation/treatment for hearing issues    Safety:  Safety  Do you have working smoke detectors?: Yes  Have all throw rugs been removed or fastened?:  (n/a)  Do you have non-slip mats or surfaces in all bathtubs/showers?: (!) No  Do all of your stairways have a railing or banister?: Yes  Are your doorways, halls and stairs free of clutter?: Yes  Do you always fasten your seatbelt when you are in a car?: Yes  Safety Interventions:  · Home safety tips provided     Personalized Preventive Plan   Current Health Maintenance Status  There is no immunization history for the selected administration types on file for this patient.      Health Maintenance   Topic Date Due    Hepatitis C screen  Never done    COVID-19 Vaccine (1) Never done    DTaP/Tdap/Td vaccine (1 - Tdap) Never done    Shingles Vaccine (1 of 2) Never done    Pneumococcal 65+ years Vaccine (1 of 1 - PPSV23) Never done    Lipid screen  04/22/2020    Annual Wellness Visit (AWV)  12/19/2020    Flu vaccine (1) Never done    Potassium monitoring  03/18/2022    Creatinine monitoring  03/18/2022    Hepatitis A vaccine  Aged Out    Hib vaccine  Aged Out    Meningococcal (ACWY) vaccine  Aged Out     Recommendations for [place] Due: see orders and patient instructions/AVS.  . Recommended screening schedule for the next 5-10 years is provided to the patient in written form: see Patient Manpreet Taylor was seen today for medicare awv. 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 (HCC)  -     HEMOGLOBIN A1C; Future  -     COMPREHENSIVE METABOLIC PANEL; Future  -     CBC WITH AUTO DIFFERENTIAL; Future  -     MICROALBUMIN / CREATININE URINE RATIO; Future  -     POCT Urinalysis no Micro    Mixed hyperlipidemia    Coronary artery disease involving native coronary artery of native heart without angina pectoris  -     TSH with Reflex; Future    Esophageal stricture s/p dilatation (June 2017)    CAD s/p CABGx3 (2016)  -     TSH with Reflex; Future  -     DIGOXIN LEVEL; Future    Chronic combined systolic and diastolic congestive heart failure (HCC)  -     DIGOXIN LEVEL; Future    Dilated cardiomyopathy (HCC)  -     TSH with Reflex; Future    Screening for hyperlipidemia  -     Lipid Panel; Future    Routine general medical examination at a health care facility                 Advance Care Planning   Advanced Care Planning: Discussed the patients choices for care and treatment in case of a health event that adversely affects decision-making abilities. Also discussed the patients long-term treatment options.  Reviewed with the patient the [address] [place] [address] Living Will Declaration forms  Reviewed the process of designating a competent adult as an Agent (or -in-fact) that could take make health care decisions for the patient if incompetent. Patient was asked to complete the declaration forms, either acknowledge the forms by a public notary or an eligible witness and provide a signed copy to the practice office.   Time spent (minutes): 2 min

## 2021-10-26 NOTE — PATIENT INSTRUCTIONS
Personalized Preventive Plan for Moo Morgan - 10/26/2021  Medicare offers a range of preventive health benefits. Some of the tests and screenings are paid in full while other may be subject to a deductible, co-insurance, and/or copay. Some of these benefits include a comprehensive review of your medical history including lifestyle, illnesses that may run in your family, and various assessments and screenings as appropriate. After reviewing your medical record and screening and assessments performed today your provider may have ordered immunizations, labs, imaging, and/or referrals for you. A list of these orders (if applicable) as well as your Preventive Care list are included within your After Visit Summary for your review. Other Preventive Recommendations:    · A preventive eye exam performed by an eye specialist is recommended every 1-2 years to screen for glaucoma; cataracts, macular degeneration, and other eye disorders. · A preventive dental visit is recommended every 6 months. · Try to get at least 150 minutes of exercise per week or 10,000 steps per day on a pedometer . · Order or download the FREE \"Exercise & Physical Activity: Your Everyday Guide\" from [employer]. Call 1-108.130.7995 or search [employer] online. · You need 3627-6583 mg of calcium and 9282-0868 IU of vitamin D per day. It is possible to meet your calcium requirement with diet alone, but a vitamin D supplement is usually necessary to meet this goal.  · When exposed to the sun, use a sunscreen that protects against both UVA and UVB radiation with an SPF of 30 or greater. Reapply every 2 to 3 hours or after sweating, drying off with a towel, or swimming. · Always wear a seat belt when traveling in a car. Always wear a helmet when riding a bicycle or motorcycle.

## 2021-10-27 LAB
A/G RATIO: 1.1 (ref 1.1–2.2)
ALBUMIN SERPL-MCNC: 4.2 G/DL (ref 3.4–5)
ALP BLD-CCNC: 73 U/L (ref 40–129)
ALT SERPL-CCNC: 16 U/L (ref 10–40)
ANION GAP SERPL CALCULATED.3IONS-SCNC: 19 MMOL/L (ref 3–16)
AST SERPL-CCNC: 22 U/L (ref 15–37)
BASOPHILS ABSOLUTE: 0.1 K/UL (ref 0–0.2)
BASOPHILS RELATIVE PERCENT: 0.9 %
BILIRUB SERPL-MCNC: 0.3 MG/DL (ref 0–1)
BUN BLDV-MCNC: 8 MG/DL (ref 7–20)
CALCIUM SERPL-MCNC: 10.5 MG/DL (ref 8.3–10.6)
CHLORIDE BLD-SCNC: 100 MMOL/L (ref 99–110)
CHOLESTEROL, TOTAL: 154 MG/DL (ref 0–199)
CO2: 19 MMOL/L (ref 21–32)
CREAT SERPL-MCNC: 1.1 MG/DL (ref 0.8–1.3)
CREATININE URINE: 40.1 MG/DL (ref 39–259)
DIGOXIN LEVEL: 0.7 NG/ML (ref 0.8–2)
EOSINOPHILS ABSOLUTE: 0.3 K/UL (ref 0–0.6)
EOSINOPHILS RELATIVE PERCENT: 3.9 %
ESTIMATED AVERAGE GLUCOSE: 142.7 MG/DL
GFR AFRICAN AMERICAN: >60
GFR NON-AFRICAN AMERICAN: >60
GLOBULIN: 3.7 G/DL
GLUCOSE BLD-MCNC: 118 MG/DL (ref 70–99)
HBA1C MFR BLD: 6.6 %
HCT VFR BLD CALC: 42.1 % (ref 40.5–52.5)
HDLC SERPL-MCNC: 27 MG/DL (ref 40–60)
HEMOGLOBIN: 14.4 G/DL (ref 13.5–17.5)
LDL CHOLESTEROL CALCULATED: 74 MG/DL
LYMPHOCYTES ABSOLUTE: 2.2 K/UL (ref 1–5.1)
LYMPHOCYTES RELATIVE PERCENT: 26.4 %
MCH RBC QN AUTO: 29.1 PG (ref 26–34)
MCHC RBC AUTO-ENTMCNC: 34.1 G/DL (ref 31–36)
MCV RBC AUTO: 85.4 FL (ref 80–100)
MICROALBUMIN UR-MCNC: <1.2 MG/DL
MICROALBUMIN/CREAT UR-RTO: NORMAL MG/G (ref 0–30)
MONOCYTES ABSOLUTE: 0.7 K/UL (ref 0–1.3)
MONOCYTES RELATIVE PERCENT: 7.9 %
NEUTROPHILS ABSOLUTE: 5.1 K/UL (ref 1.7–7.7)
NEUTROPHILS RELATIVE PERCENT: 60.9 %
PDW BLD-RTO: 14.3 % (ref 12.4–15.4)
PLATELET # BLD: 233 K/UL (ref 135–450)
PMV BLD AUTO: 8.8 FL (ref 5–10.5)
POTASSIUM SERPL-SCNC: 4.4 MMOL/L (ref 3.5–5.1)
PROSTATE SPECIFIC ANTIGEN: 0.68 NG/ML (ref 0–4)
RBC # BLD: 4.93 M/UL (ref 4.2–5.9)
SODIUM BLD-SCNC: 138 MMOL/L (ref 136–145)
TOTAL PROTEIN: 7.9 G/DL (ref 6.4–8.2)
TRIGL SERPL-MCNC: 267 MG/DL (ref 0–150)
TSH REFLEX: 2.69 UIU/ML (ref 0.27–4.2)
URIC ACID, SERUM: 6.5 MG/DL (ref 3.5–7.2)
VLDLC SERPL CALC-MCNC: 53 MG/DL
WBC # BLD: 8.5 K/UL (ref 4–11)

## 2021-12-13 ENCOUNTER — TELEPHONE (OUTPATIENT)
Dept: INTERNAL MEDICINE CLINIC | Age: 76
End: 2021-12-13

## 2021-12-13 NOTE — TELEPHONE ENCOUNTER
----- Message from Skyler Peterson MD sent at 12/13/2021  1:37 PM EST -----  Open office on Monday starting 10 am- 12 pm    ----- Message -----  From: Kwadwo Huang MA  Sent: 12/13/2021  10:45 AM EST  To: Skyler Peterson MD    Pts daughter called wanting to get the pt in for an appointment said he has sore on his back that are scabbed and wants you to take a look at them she noticed them today

## 2021-12-20 ENCOUNTER — OFFICE VISIT (OUTPATIENT)
Dept: INTERNAL MEDICINE CLINIC | Age: 76
End: 2021-12-20

## 2021-12-20 VITALS
HEIGHT: 65 IN | BODY MASS INDEX: 23.32 KG/M2 | RESPIRATION RATE: 18 BRPM | WEIGHT: 140 LBS | DIASTOLIC BLOOD PRESSURE: 78 MMHG | HEART RATE: 60 BPM | SYSTOLIC BLOOD PRESSURE: 138 MMHG

## 2021-12-20 DIAGNOSIS — L57.0 ACTINIC KERATOSES: Primary | ICD-10-CM

## 2021-12-20 PROCEDURE — 99212 OFFICE O/P EST SF 10 MIN: CPT | Performed by: INTERNAL MEDICINE

## 2021-12-20 PROCEDURE — G8427 DOCREV CUR MEDS BY ELIG CLIN: HCPCS | Performed by: INTERNAL MEDICINE

## 2021-12-20 PROCEDURE — 4040F PNEUMOC VAC/ADMIN/RCVD: CPT | Performed by: INTERNAL MEDICINE

## 2021-12-20 PROCEDURE — 1123F ACP DISCUSS/DSCN MKR DOCD: CPT | Performed by: INTERNAL MEDICINE

## 2021-12-20 PROCEDURE — G8484 FLU IMMUNIZE NO ADMIN: HCPCS | Performed by: INTERNAL MEDICINE

## 2021-12-20 PROCEDURE — G8420 CALC BMI NORM PARAMETERS: HCPCS | Performed by: INTERNAL MEDICINE

## 2021-12-20 PROCEDURE — 1036F TOBACCO NON-USER: CPT | Performed by: INTERNAL MEDICINE

## 2021-12-20 NOTE — PROGRESS NOTES
Subjective:      Patient ID: Leslie Mcclure is a 67 y.o. male. HPI     67 y.o. male with hx of  DM - 2,  Ischemic cardiomyopathy, s.p CABG, AICD , HTn, CVA , prostate enlargement here for new spots on his back     Daughter reports 2 cm size dry itchy spots on his back which have been present for a while         Current Outpatient Medications   Medication Sig Dispense Refill    glipiZIDE (GLUCOTROL) 5 MG tablet Take 1 tablet by mouth 2 times daily (before meals) 180 tablet 0    digoxin (LANOXIN) 125 MCG tablet Take 1 tablet by mouth daily 90 tablet 3    lisinopril (PRINIVIL;ZESTRIL) 5 MG tablet Take 2 tablets by mouth daily 180 tablet 0    finasteride (PROSCAR) 5 MG tablet Take 1 tablet by mouth daily 90 tablet 0    atorvastatin (LIPITOR) 40 MG tablet Take 1 tablet by mouth nightly 90 tablet 3    metoprolol succinate (TOPROL XL) 25 MG extended release tablet Take 1 tablet by mouth nightly 30 tablet 11    pantoprazole (PROTONIX) 40 MG tablet Take 1 tablet by mouth daily 90 tablet 1    clopidogrel (PLAVIX) 75 MG tablet Take 1 tablet by mouth daily 90 tablet 0    aspirin 81 MG tablet Take 81 mg by mouth daily        No current facility-administered medications for this visit. Objective:   Physical Exam     Vitals:    12/20/21 1001   BP: 138/78   Pulse: 60   Resp: 18       General:  eldelry male,  Thin built  Awake, alert and oriented. Appears to be not in any distress  Mucous Membranes:  Pink , anicteric  Neck: No JVD, no carotid bruit, no thyromegaly  Chest:  Clear to auscultation bilaterally, no added sounds  Left AICD +  Cardiovascular:  RRR S1S2 heard, no murmurs or gallops  Abdomen:  Soft, undistended, non tender, no organomegaly, BS present   skin - mulitple AK lesions on upper back and anterior chest    No edema or cyanosis. Distal pulses well felt  Neurological : grossly normal          Assessment:       Diagnosis Orders   1.  Actinic keratoses         Plan:       Several AK lesions on

## 2022-01-07 ENCOUNTER — HOSPITAL ENCOUNTER (EMERGENCY)
Age: 77
Discharge: ANOTHER HOSPITAL | End: 2022-01-07
Attending: EMERGENCY MEDICINE
Payer: OTHER GOVERNMENT

## 2022-01-07 ENCOUNTER — APPOINTMENT (OUTPATIENT)
Dept: CT IMAGING | Age: 77
End: 2022-01-07
Payer: OTHER GOVERNMENT

## 2022-01-07 ENCOUNTER — APPOINTMENT (OUTPATIENT)
Dept: GENERAL RADIOLOGY | Age: 77
End: 2022-01-07
Payer: OTHER GOVERNMENT

## 2022-01-07 VITALS
DIASTOLIC BLOOD PRESSURE: 76 MMHG | OXYGEN SATURATION: 97 % | TEMPERATURE: 97.7 F | RESPIRATION RATE: 17 BRPM | HEART RATE: 85 BPM | BODY MASS INDEX: 23.63 KG/M2 | WEIGHT: 142 LBS | SYSTOLIC BLOOD PRESSURE: 150 MMHG

## 2022-01-07 DIAGNOSIS — R42 DIZZINESS: ICD-10-CM

## 2022-01-07 DIAGNOSIS — G45.9 TIA (TRANSIENT ISCHEMIC ATTACK): Primary | ICD-10-CM

## 2022-01-07 LAB
A/G RATIO: 1.3 (ref 1.1–2.2)
ALBUMIN SERPL-MCNC: 4.5 G/DL (ref 3.4–5)
ALP BLD-CCNC: 64 U/L (ref 40–129)
ALT SERPL-CCNC: 16 U/L (ref 10–40)
ANION GAP SERPL CALCULATED.3IONS-SCNC: 15 MMOL/L (ref 3–16)
AST SERPL-CCNC: 23 U/L (ref 15–37)
BASOPHILS ABSOLUTE: 0 K/UL (ref 0–0.2)
BASOPHILS RELATIVE PERCENT: 0.4 %
BILIRUB SERPL-MCNC: 0.4 MG/DL (ref 0–1)
BILIRUBIN URINE: NEGATIVE
BLOOD, URINE: NEGATIVE
BUN BLDV-MCNC: 8 MG/DL (ref 7–20)
CALCIUM SERPL-MCNC: 9.9 MG/DL (ref 8.3–10.6)
CHLORIDE BLD-SCNC: 99 MMOL/L (ref 99–110)
CLARITY: CLEAR
CO2: 24 MMOL/L (ref 21–32)
COLOR: YELLOW
CREAT SERPL-MCNC: 1 MG/DL (ref 0.8–1.3)
DIGOXIN LEVEL: <0.3 NG/ML (ref 0.8–2)
EOSINOPHILS ABSOLUTE: 0 K/UL (ref 0–0.6)
EOSINOPHILS RELATIVE PERCENT: 0.3 %
GFR AFRICAN AMERICAN: >60
GFR NON-AFRICAN AMERICAN: >60
GLUCOSE BLD-MCNC: 220 MG/DL (ref 70–99)
GLUCOSE BLD-MCNC: 235 MG/DL (ref 70–99)
GLUCOSE URINE: 500 MG/DL
HCT VFR BLD CALC: 45.2 % (ref 40.5–52.5)
HEMOGLOBIN: 14.8 G/DL (ref 13.5–17.5)
INR BLD: 1.12 (ref 0.88–1.12)
KETONES, URINE: NEGATIVE MG/DL
LEUKOCYTE ESTERASE, URINE: NEGATIVE
LYMPHOCYTES ABSOLUTE: 1 K/UL (ref 1–5.1)
LYMPHOCYTES RELATIVE PERCENT: 9.1 %
MCH RBC QN AUTO: 28 PG (ref 26–34)
MCHC RBC AUTO-ENTMCNC: 32.7 G/DL (ref 31–36)
MCV RBC AUTO: 85.6 FL (ref 80–100)
MICROSCOPIC EXAMINATION: ABNORMAL
MONOCYTES ABSOLUTE: 0.4 K/UL (ref 0–1.3)
MONOCYTES RELATIVE PERCENT: 3.5 %
NEUTROPHILS ABSOLUTE: 9.6 K/UL (ref 1.7–7.7)
NEUTROPHILS RELATIVE PERCENT: 86.7 %
NITRITE, URINE: NEGATIVE
PDW BLD-RTO: 13.8 % (ref 12.4–15.4)
PERFORMED ON: ABNORMAL
PH UA: 5.5 (ref 5–8)
PLATELET # BLD: 239 K/UL (ref 135–450)
PMV BLD AUTO: 8.4 FL (ref 5–10.5)
POTASSIUM REFLEX MAGNESIUM: 4.4 MMOL/L (ref 3.5–5.1)
PROTEIN UA: NEGATIVE MG/DL
PROTHROMBIN TIME: 12.7 SEC (ref 9.9–12.7)
RBC # BLD: 5.28 M/UL (ref 4.2–5.9)
SODIUM BLD-SCNC: 138 MMOL/L (ref 136–145)
SPECIFIC GRAVITY UA: 1.02 (ref 1–1.03)
TOTAL PROTEIN: 7.9 G/DL (ref 6.4–8.2)
TROPONIN: <0.01 NG/ML
URINE REFLEX TO CULTURE: ABNORMAL
URINE TYPE: ABNORMAL
UROBILINOGEN, URINE: 0.2 E.U./DL
WBC # BLD: 11.1 K/UL (ref 4–11)

## 2022-01-07 PROCEDURE — 93005 ELECTROCARDIOGRAM TRACING: CPT | Performed by: NURSE PRACTITIONER

## 2022-01-07 PROCEDURE — 80162 ASSAY OF DIGOXIN TOTAL: CPT

## 2022-01-07 PROCEDURE — 6360000004 HC RX CONTRAST MEDICATION: Performed by: EMERGENCY MEDICINE

## 2022-01-07 PROCEDURE — 99284 EMERGENCY DEPT VISIT MOD MDM: CPT

## 2022-01-07 PROCEDURE — 6370000000 HC RX 637 (ALT 250 FOR IP): Performed by: EMERGENCY MEDICINE

## 2022-01-07 PROCEDURE — 85610 PROTHROMBIN TIME: CPT

## 2022-01-07 PROCEDURE — 36415 COLL VENOUS BLD VENIPUNCTURE: CPT

## 2022-01-07 PROCEDURE — 85025 COMPLETE CBC W/AUTO DIFF WBC: CPT

## 2022-01-07 PROCEDURE — 80053 COMPREHEN METABOLIC PANEL: CPT

## 2022-01-07 PROCEDURE — 70450 CT HEAD/BRAIN W/O DYE: CPT

## 2022-01-07 PROCEDURE — 70498 CT ANGIOGRAPHY NECK: CPT

## 2022-01-07 PROCEDURE — 84484 ASSAY OF TROPONIN QUANT: CPT

## 2022-01-07 PROCEDURE — 81003 URINALYSIS AUTO W/O SCOPE: CPT

## 2022-01-07 PROCEDURE — 71045 X-RAY EXAM CHEST 1 VIEW: CPT

## 2022-01-07 RX ORDER — ASPIRIN 81 MG/1
324 TABLET, CHEWABLE ORAL ONCE
Status: COMPLETED | OUTPATIENT
Start: 2022-01-07 | End: 2022-01-07

## 2022-01-07 RX ADMIN — ASPIRIN 324 MG: 81 TABLET, CHEWABLE ORAL at 22:00

## 2022-01-07 RX ADMIN — IOPAMIDOL 85 ML: 755 INJECTION, SOLUTION INTRAVENOUS at 19:21

## 2022-01-07 NOTE — ED PROVIDER NOTES
ED Attending Attestation Note    This patient was seen by the advanced practice provider. I have seen and examined the patient. I agree with the workup, evaluation, management, and diagnosis. The care plan has been discussed. 67 y.o. male presents with complaint of dizziness. Woke up around 0800 or 0900 feeling well. Got out of bed to use the bathroom and developed a room spinning sensation. The patient cites a history of vertigo with postural change. He also dictates that he was previously told he had a stroke that initially started with a dizzy episode such as this for which she was evaluated, discharged, and then found of had evidence of stroke the next day. He denies any chest pain, shortness of breath, weakness, numbness, or other visual disturbance. Focused exam:   Gen: 67 y.o. male, NAD  HEENT: NCAT. PERRL. EOMI. CV: RRR w/o MRG  Lungs: CTAB. No incr WOB. Abdomen: Soft, nontender, nondistended. No rebound/guarding. Neuro: NIH Stroke Scale  Interval: Baseline  Level of Consciousness (1a. ): Alert  LOC Questions (1b. ): Answers both correctly  LOC Commands (1c. ): Performs both tasks correctly  Best Gaze (2. ): Normal  Visual (3. ): No visual loss  Facial Palsy (4. ): Normal symmetrical movement  Motor Arm, Left (5a. ): No drift  Motor Arm, Right (5b. ): No drift  Motor Leg, Left (6a. ): No drift  Motor Leg, Right (6b. ): No drift  Limb Ataxia (7. ): Absent  Sensory (8. ): Normal  Best Language (9. ): No aphasia  Dysarthria (10. ): Normal  Extinction and Inattention (11): No abnormality  Total: 0  Negative test of skew    The patient has a nonfocal neurological examination. We do long discussion about the need for stroke work-up to include MRI. Family indicates a preference to defer such an evaluation as the patient is at this point entirely asymptomatic and patient believes the symptoms are more likely to represent vertigo.   He does not wish to be admitted to the hospital.  He does wish to pursue noncontrast head CT and CT angiography of the head and neck, which are the studies that apparently diagnosed his previous remote stroke. 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 They like to proceed with the scan and do not wish for premedication for allergic reaction. We will obtain the imaging studies and will observe for intolerance. Patient tolerated the contrasted imaging study without any evidence of an allergic reaction. CTA HEAD NECK W CONTRAST   Final Result   There is 60-70% stenosis in the origin of right ICA. 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 No evidence of intracranial large vessel occlusion. RECOMMENDATIONS:   Unavailable         CT HEAD WO CONTRAST   Final Result   Chronic ischemic changes as above with no acute intracranial abnormality. Generalized atrophy. RECOMMENDATIONS:   Unavailable         XR CHEST PORTABLE   Final Result   No acute process. EKG interpreted by myself. Rate: 80  Rhythm: electronic ventricular pacemaker  Axis: 115  Intervals:     ST Segments: no acute abnormality  T waves: no acute abnormality  Comparison: Compared to 7/12/21, there is no significant change  Impression: electronic ventricular pacemaker       Discussed the findings of the possible occlusion versus severe stenosis of the origin of the left vertebral artery with Dr. Espinoza Edwards / neurosurgery. Likely congenital. Will administer full dose ASA, no other anticoagulation. Will transfer to [facility] for TIA eval. transfer for availability of neurology service is not available at this facility. CRITICAL CARE  Total critical care time provided today thus far was 33 minutes. This excludes seperately billable procedures.  Critical care time was provided for stroke like symptoms that required close evaluation and/or intervention with concern for potential patient decompensation. For further details of the patient's emergency department visit, please see the [facility]'s documentation. Carloz Raman MD     This report has been produced using speech recognition software and may contain errors related to that system including errors in grammar, punctuation, and spelling, as well as words and phrases that may be inappropriate. If there are any questions or concerns please feel free to contact the dictating provider for clarification.        Carloz Raman MD  01/07/22 0913       Carloz Raman MD  01/07/22 9213

## 2022-01-07 NOTE — ED PROVIDER NOTES
[address] ED  EMERGENCY DEPARTMENT ENCOUNTER        Pt Name: Jimenez Brunson  MRN: [id number]  [place] 1945  Date of evaluation: 1/7/2022  Provider: AUGUSTO Garcia - CNP  PCP: Bert Bishop MD  Note Started: 5:26 PM EST       I have seen and evaluated this patient with my supervising physician No att. providers found. Triage CHIEF COMPLAINT       Chief Complaint   Patient presents with    Dizziness     states vertigo starting approx noon. states N/V    Nausea         HISTORY OF PRESENT ILLNESS   (Location/Symptom, Timing/Onset, Context/Setting, Quality, Duration, Modifying Factors, Severity)  Note limiting factors. Chief Complaint: Dizziness    Jimenez Brunson is a 67 y.o. male who presents to the emergency department with symptoms of dizziness. He reported that he had symptoms that began approximately at 9 AM this morning. He states that he had awakened and was up for approximately 2 hours before he started to have symptoms this morning. States that initially he felt well. States that he now has symptoms of general weakness. He states that he has symptoms of general fatigue and dizziness. He described the dizziness as the room was spinning. At this time he states that his symptoms have drastically improved. He had reported a few episodes of vomiting. States that he went to a local ER and felt as though he was being treated appropriately and then left. He then came to this emergency department for an evaluation. Nursing Notes were all reviewed and agreed with or any disagreements were addressed in the HPI. REVIEW OF SYSTEMS    (2-9 systems for level 4, 10 or more for level 5)     Review of Systems   Constitutional: Negative for chills, diaphoresis and fever. HENT: Negative for congestion, ear pain, rhinorrhea and sore throat. Eyes: Negative for pain and visual disturbance. Respiratory: Negative for cough and shortness of breath. Cardiovascular: Negative for chest pain and leg swelling. Gastrointestinal: Negative for abdominal pain, blood in stool, diarrhea, nausea and vomiting. Genitourinary: Negative for difficulty urinating, dysuria, flank pain and frequency. Musculoskeletal: Negative for back pain and neck pain. Skin: Negative for rash and wound. Neurological: Positive for dizziness and light-headedness.        PAST MEDICAL HISTORY     Past Medical History:   Diagnosis Date    Abnormal echocardiogram 09/2016    Abnormal stress test 09/2016    Cardiomyopathy ([facility] [address] 09/2016    EF 23%    [facility]     Diabetes ([facility]     Hyperlipidemia     Hypertension        SURGICAL HISTORY     Past Surgical History:   Procedure Laterality Date    COLONOSCOPY  07/16/2016    transverse polyp    CORONARY ARTERY BYPASS GRAFT  09/16/2016    cabg x 3    CYSTOSCOPY  03/2018    NECK SURGERY      plate in neck     UPPER GASTROINTESTINAL ENDOSCOPY  05/22/2017    esophageal stricture       CURRENTMEDICATIONS       Discharge Medication List as of 1/8/2022 12:02 AM      CONTINUE these medications which have NOT CHANGED    Details   glipiZIDE (GLUCOTROL) 5 MG tablet Take 1 tablet by mouth 2 times daily (before meals), Disp-180 tablet, R-0Print      digoxin (LANOXIN) 125 MCG tablet Take 1 tablet by mouth daily, Disp-90 tablet, R-3Print      lisinopril (PRINIVIL;ZESTRIL) 5 MG tablet Take 2 tablets by mouth daily, Disp-180 tablet, R-0Print      finasteride (PROSCAR) 5 MG tablet Take 1 tablet by mouth daily, Disp-90 tablet, R-0Print      atorvastatin (LIPITOR) 40 MG tablet Take 1 tablet by mouth nightly, Disp-90 tablet, R-3Print      metoprolol succinate (TOPROL XL) 25 MG extended release tablet Take 1 tablet by mouth nightly, Disp-30 tablet, R-11Print      pantoprazole (PROTONIX) 40 MG tablet Take 1 tablet by mouth daily, Disp-90 tablet, R-1Print      clopidogrel (PLAVIX) 75 MG tablet Take 1 tablet by mouth daily, Disp-90 tablet, R-0Print      aspirin 81 MG tablet Take 81 mg by mouth daily Historical Med             ALLERGIES     Iv dye [iodides], Persantine [dipyridamole], and Coreg [carvedilol]    FAMILYHISTORY       Family History   Problem Relation Age of Onset    Diabetes Father     Heart Disease Father     Cancer Father     High Blood Pressure Father     Diabetes Sister     Diabetes Brother     Cancer Brother         pancreatic        SOCIAL HISTORY       Social History     Socioeconomic History    Marital status:      Spouse name: None    Number of children: None    Years of education: None    Highest education level: None   Occupational History    None   Tobacco Use    Smoking status: Never Smoker    Smokeless tobacco: Never Used   Substance and Sexual Activity    Alcohol use: No    Drug use: No    Sexual activity: Not Currently   Other Topics Concern    None   Social History Narrative    None     Social Determinants of Health     Financial Resource Strain:     Difficulty of Paying Living Expenses: Not on file   Food Insecurity:     Worried About Running Out of Food in the Last Year: Not on file    Cornelius of Food in the Last Year: Not on file   Transportation Needs:     Lack of Transportation (Medical): Not on file    Lack of Transportation (Non-Medical):  Not on file   Physical Activity:     Days of Exercise per Week: Not on file    Minutes of Exercise per Session: Not on file   Stress:     Feeling of Stress : Not on file   Social Connections:     Frequency of Communication with Friends and Family: Not on file    Frequency of Social Gatherings with Friends and Family: Not on file    Attends Hindu Services: Not on file    Active Member of Clubs or Organizations: Not on file    Attends Club or Organization Meetings: Not on file    Marital Status: Not on file   Intimate Partner Violence:     Fear of Current or Ex-Partner: Not on file    Emotionally Abused: Not on file    Physically Abused: Not on file    Sexually Abused: Not on file   Housing Stability:     Unable to Pay for Housing in the Last Year: Not on file    Number of Places Lived in the Last Year: Not on file    Unstable Housing in the Last Year: Not on file       SCREENINGS   NIH Stroke Scale  Interval: Baseline  Level of Consciousness (1a. ): Alert  LOC Questions (1b. ): Answers both correctly  LOC Commands (1c. ): Performs both tasks correctly  Best Gaze (2. ): Normal  Visual (3. ): No visual loss  Facial Palsy (4. ): Normal symmetrical movement  Motor Arm, Left (5a. ): No drift  Motor Arm, Right (5b. ): No drift  Motor Leg, Left (6a. ): No drift  Motor Leg, Right (6b. ): No drift  Limb Ataxia (7. ): Absent  Sensory (8. ): Normal  Best Language (9. ): No aphasia  Dysarthria (10. ): Normal  Extinction and Inattention (11): No abnormality  Total: 0         PHYSICAL EXAM    (up to 7 for level 4, 8 or more for level 5)     ED Triage Vitals   BP Temp Temp Source Pulse Resp SpO2 Height Weight   01/07/22 1652 01/07/22 1651 01/07/22 1651 01/07/22 1651 01/07/22 1651 01/07/22 1651 -- 01/07/22 1650   (!) 179/78 97.7 °F (36.5 °C) Temporal 84 18 98 %  142 lb (64.4 kg)       Physical Exam  Vitals and nursing note reviewed. Constitutional:       Appearance: Normal appearance. He is not toxic-appearing or diaphoretic. HENT:      Head: Normocephalic and atraumatic. Right Ear: Tympanic membrane normal.      Left Ear: Tympanic membrane normal.      Nose: Nose normal.   Eyes:      General: No visual field deficit. Right eye: No discharge. Left eye: No discharge. Cardiovascular:      Rate and Rhythm: Normal rate and regular rhythm. Pulses: Normal pulses. Heart sounds: No murmur heard. Pulmonary:      Effort: Pulmonary effort is normal. No respiratory distress. Abdominal:      Palpations: Abdomen is soft. Tenderness: There is no abdominal tenderness.    Musculoskeletal:         General: Normal range of motion. Cervical back: Normal range of motion and neck supple. Skin:     General: Skin is warm and dry. Neurological:      General: No focal deficit present. Mental Status: He is alert and oriented to person, place, and time. GCS: GCS eye subscore is 4. GCS verbal subscore is 5. GCS motor subscore is 6. Cranial Nerves: Cranial nerves are intact. No dysarthria or facial asymmetry. Sensory: Sensation is intact. No sensory deficit. Motor: Motor function is intact. No abnormal muscle tone or pronator drift. Coordination: Finger-Nose-Finger Test normal.      Gait: Gait is intact.  Gait normal.   Psychiatric:         Mood and Affect: Mood normal.         Behavior: Behavior normal.         DIAGNOSTIC RESULTS   LABS:    Labs Reviewed   URINE RT REFLEX TO CULTURE - Abnormal; Notable for the following components:       Result Value    Glucose, Ur 500 (*)     All other components within normal limits    Narrative:     Performed at:  [place] [address],  [employer], [facility]   Phone [phone]   COMPREHENSIVE METABOLIC PANEL W/ REFLEX TO MG FOR LOW K - Abnormal; Notable for the following components:    Glucose 235 (*)     All other components within normal limits    Narrative:     Performed at:  [facility],  [place], [facility]   Phone [phone]   CBC WITH AUTO DIFFERENTIAL - Abnormal; Notable for the following components:    WBC 11.1 (*)     Neutrophils Absolute 9.6 (*)     All other components within normal limits    Narrative:     Performed at:  [facility]) - [facility],  [place], [facility]   Phone [phone]   DIGOXIN LEVEL - Abnormal; Notable for the following components:    Digoxin Lvl <0.3 (*)     All other components within normal limits    Narrative:     Performed at:  [facility]) - [facility], [place], [facility]   Phone [phone]   POCT GLUCOSE - Abnormal; Notable for the following components:    POC Glucose 220 (*)     All other components within normal limits    Narrative:     Performed at:  [facility],  [place], [facility]   Phone [phone]   TROPONIN    Narrative:     Performed at:  [facility],  [place], [facility]   Phone [phone]   PROTIME-INR    Narrative:     Performed at:  [facility] - [facility],  [place], [facility]   Phone [phone]   POCT GLUCOSE       When ordered, only abnormal lab results are displayed. All other labs were within normal range or not returned as of this dictation. EKG: When ordered, EKG's are interpreted by the Emergency Department Physician in the absence of a cardiologist.  Please see their note for interpretation of EKG. RADIOLOGY:   Non-plain film images such as CT, Ultrasound and MRI are read by the radiologist. Plain radiographic images are visualized andpreliminarily interpreted by the  ED Provider with the below findings:        Interpretation [facility] Radiologist below, if available at the time of this note:     [facility]   Final Result   There is 60-70% stenosis in the origin of right ICA. 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 No evidence of intracranial large vessel occlusion. RECOMMENDATIONS:   Unavailable         CT HEAD WO CONTRAST   Final Result   Chronic ischemic changes as above with no acute intracranial abnormality. Generalized atrophy. RECOMMENDATIONS:   Unavailable         XR CHEST PORTABLE   Final Result   No acute process. No results found.       PROCEDURES   Unless otherwise noted below, none     Procedures    CRITICAL CARE TIME   N/A    CONSULTS:  IP CONSULT TO PHARMACY  PHARMACY TO CHANGE BASE FLUIDS  IP CONSULT TO NEUROSURGERY      EMERGENCY DEPARTMENT COURSE and DIFFERENTIAL DIAGNOSIS/MDM:   Vitals:    Vitals:    01/07/22 2301 01/07/22 2331 01/07/22 2352 01/07/22 2353   BP: (!) 140/64 (!) 158/72  (!) 150/76   Pulse: 81 82 85    Resp: 16 15 17    Temp:       TempSrc:       SpO2: 97% 97% 95% 97%   Weight:           Patient was given thefollowing medications:  Medications   iopamidol (ISOVUE-370) 76 % injection 85 mL (85 mLs IntraVENous Given 1/7/22 1921)   aspirin chewable tablet 324 mg (324 mg Oral Given 1/7/22 2200)     ED Course as of 01/08/22 1945 Fri Jan 07, 2022 2321 Acute vertigo  NIH SS of 0  Severe stenosis/occlusion  Transfer to [address] [place] [address] [ER]      ED Course User Index  [ER] Eugenio Estrada MD        The patient is in the emergency department as he had symptoms of dizziness this morning. Throughout the afternoon and morning by time he arrived here to the emergency department his symptoms had resolved. He is now ambulatory without any difficulty. His symptoms are concerning for possible TIA. The patient CT scan was reviewed with the patient and the patient's daughter. Also scan was reviewed with Dr. Leonel Valentin. At the end of my shift the plan was the patients case was gone to be discussed with neurosurgery and Dr. Leonel Valentin to take over on the patient's case. FINAL IMPRESSION      1. TIA (transient ischemic attack)    2. Dizziness          DISPOSITION/PLAN   DISPOSITION Decision To Transfer 01/07/2022 08:41:24 PM      PATIENT REFERREDTO:  No follow-up provider specified.     DISCHARGE MEDICATIONS:  Discharge Medication List as of 1/8/2022 12:02 AM          DISCONTINUED MEDICATIONS:  Discharge Medication List as of 1/8/2022 12:02 AM                 (Please note that portions ofthis note were completed with a voice recognition program.  Efforts were made to edit the dictations but occasionally words are mis-transcribed.)    AUGUSTO Diaz - CNP (electronically signed)           AUGUSTO Diaz - CNP  01/08/22 1945

## 2022-01-07 NOTE — ED NOTES
Bed: 13  Expected date:   Expected time:   Means of arrival:   Comments:     Calvin Nieves RN  01/07/22 7896

## 2022-01-08 ENCOUNTER — NURSE ONLY (OUTPATIENT)
Dept: CARDIOLOGY CLINIC | Age: 77
End: 2022-01-08

## 2022-01-08 ENCOUNTER — HOSPITAL ENCOUNTER (INPATIENT)
Age: 77
LOS: 1 days | Discharge: HOME | DRG: 312 | End: 2022-01-08
Attending: INTERNAL MEDICINE | Admitting: INTERNAL MEDICINE
Payer: OTHER GOVERNMENT

## 2022-01-08 VITALS
DIASTOLIC BLOOD PRESSURE: 72 MMHG | BODY MASS INDEX: 23.11 KG/M2 | TEMPERATURE: 98.2 F | SYSTOLIC BLOOD PRESSURE: 126 MMHG | RESPIRATION RATE: 16 BRPM | OXYGEN SATURATION: 99 % | HEART RATE: 63 BPM | WEIGHT: 138.89 LBS

## 2022-01-08 LAB
EKG ATRIAL RATE: 80 BPM
EKG DIAGNOSIS: NORMAL
EKG P AXIS: 74 DEGREES
EKG P-R INTERVAL: 180 MS
EKG Q-T INTERVAL: 406 MS
EKG QRS DURATION: 120 MS
EKG QTC CALCULATION (BAZETT): 468 MS
EKG R AXIS: 115 DEGREES
EKG T AXIS: -63 DEGREES
EKG VENTRICULAR RATE: 80 BPM
GLUCOSE BLD-MCNC: 112 MG/DL (ref 70–99)
GLUCOSE BLD-MCNC: 123 MG/DL (ref 70–99)
GLUCOSE BLD-MCNC: 142 MG/DL (ref 70–99)
PERFORMED ON: ABNORMAL

## 2022-01-08 PROCEDURE — 92526 ORAL FUNCTION THERAPY: CPT

## 2022-01-08 PROCEDURE — 36415 COLL VENOUS BLD VENIPUNCTURE: CPT

## 2022-01-08 PROCEDURE — 2060000000 HC ICU INTERMEDIATE R&B

## 2022-01-08 PROCEDURE — 93010 ELECTROCARDIOGRAM REPORT: CPT | Performed by: INTERNAL MEDICINE

## 2022-01-08 PROCEDURE — 92610 EVALUATE SWALLOWING FUNCTION: CPT

## 2022-01-08 PROCEDURE — 6370000000 HC RX 637 (ALT 250 FOR IP): Performed by: INTERNAL MEDICINE

## 2022-01-08 PROCEDURE — 83036 HEMOGLOBIN GLYCOSYLATED A1C: CPT

## 2022-01-08 PROCEDURE — 6360000002 HC RX W HCPCS: Performed by: INTERNAL MEDICINE

## 2022-01-08 RX ORDER — POLYETHYLENE GLYCOL 3350 17 G/17G
17 POWDER, FOR SOLUTION ORAL DAILY PRN
Status: DISCONTINUED | OUTPATIENT
Start: 2022-01-08 | End: 2022-01-08 | Stop reason: HOSPADM

## 2022-01-08 RX ORDER — GLIPIZIDE 5 MG/1
5 TABLET ORAL
Status: DISCONTINUED | OUTPATIENT
Start: 2022-01-08 | End: 2022-01-08 | Stop reason: HOSPADM

## 2022-01-08 RX ORDER — FINASTERIDE 5 MG/1
5 TABLET, FILM COATED ORAL DAILY
Status: DISCONTINUED | OUTPATIENT
Start: 2022-01-08 | End: 2022-01-08 | Stop reason: HOSPADM

## 2022-01-08 RX ORDER — ATORVASTATIN CALCIUM 40 MG/1
40 TABLET, FILM COATED ORAL NIGHTLY
Status: DISCONTINUED | OUTPATIENT
Start: 2022-01-08 | End: 2022-01-08 | Stop reason: HOSPADM

## 2022-01-08 RX ORDER — PANTOPRAZOLE SODIUM 40 MG/1
40 TABLET, DELAYED RELEASE ORAL
Status: DISCONTINUED | OUTPATIENT
Start: 2022-01-08 | End: 2022-01-08 | Stop reason: HOSPADM

## 2022-01-08 RX ORDER — ONDANSETRON 2 MG/ML
4 INJECTION INTRAMUSCULAR; INTRAVENOUS EVERY 6 HOURS PRN
Status: DISCONTINUED | OUTPATIENT
Start: 2022-01-08 | End: 2022-01-08 | Stop reason: HOSPADM

## 2022-01-08 RX ORDER — LISINOPRIL 10 MG/1
10 TABLET ORAL DAILY
Status: DISCONTINUED | OUTPATIENT
Start: 2022-01-08 | End: 2022-01-08 | Stop reason: HOSPADM

## 2022-01-08 RX ORDER — DEXTROSE MONOHYDRATE 25 G/50ML
12.5 INJECTION, SOLUTION INTRAVENOUS PRN
Status: DISCONTINUED | OUTPATIENT
Start: 2022-01-08 | End: 2022-01-08 | Stop reason: HOSPADM

## 2022-01-08 RX ORDER — INSULIN LISPRO 100 [IU]/ML
0-3 INJECTION, SOLUTION INTRAVENOUS; SUBCUTANEOUS NIGHTLY
Status: DISCONTINUED | OUTPATIENT
Start: 2022-01-08 | End: 2022-01-08 | Stop reason: HOSPADM

## 2022-01-08 RX ORDER — INSULIN LISPRO 100 [IU]/ML
0-6 INJECTION, SOLUTION INTRAVENOUS; SUBCUTANEOUS
Status: DISCONTINUED | OUTPATIENT
Start: 2022-01-08 | End: 2022-01-08 | Stop reason: HOSPADM

## 2022-01-08 RX ORDER — ONDANSETRON 4 MG/1
4 TABLET, ORALLY DISINTEGRATING ORAL EVERY 8 HOURS PRN
Status: DISCONTINUED | OUTPATIENT
Start: 2022-01-08 | End: 2022-01-08 | Stop reason: HOSPADM

## 2022-01-08 RX ORDER — NICOTINE POLACRILEX 4 MG
15 LOZENGE BUCCAL PRN
Status: DISCONTINUED | OUTPATIENT
Start: 2022-01-08 | End: 2022-01-08 | Stop reason: HOSPADM

## 2022-01-08 RX ORDER — ASPIRIN 81 MG/1
81 TABLET ORAL DAILY
Status: DISCONTINUED | OUTPATIENT
Start: 2022-01-08 | End: 2022-01-08 | Stop reason: HOSPADM

## 2022-01-08 RX ORDER — METOPROLOL SUCCINATE 25 MG/1
25 TABLET, EXTENDED RELEASE ORAL NIGHTLY
Status: DISCONTINUED | OUTPATIENT
Start: 2022-01-08 | End: 2022-01-08 | Stop reason: HOSPADM

## 2022-01-08 RX ORDER — DIGOXIN 125 MCG
125 TABLET ORAL DAILY
Status: DISCONTINUED | OUTPATIENT
Start: 2022-01-08 | End: 2022-01-08 | Stop reason: HOSPADM

## 2022-01-08 RX ORDER — CLOPIDOGREL BISULFATE 75 MG/1
75 TABLET ORAL DAILY
Status: DISCONTINUED | OUTPATIENT
Start: 2022-01-08 | End: 2022-01-08 | Stop reason: HOSPADM

## 2022-01-08 RX ORDER — DEXTROSE MONOHYDRATE 50 MG/ML
100 INJECTION, SOLUTION INTRAVENOUS PRN
Status: DISCONTINUED | OUTPATIENT
Start: 2022-01-08 | End: 2022-01-08 | Stop reason: HOSPADM

## 2022-01-08 RX ADMIN — FINASTERIDE 5 MG: 5 TABLET, FILM COATED ORAL at 09:23

## 2022-01-08 RX ADMIN — CLOPIDOGREL BISULFATE 75 MG: 75 TABLET, FILM COATED ORAL at 09:23

## 2022-01-08 RX ADMIN — DIGOXIN 125 MCG: 125 TABLET ORAL at 09:23

## 2022-01-08 RX ADMIN — ASPIRIN 81 MG: 81 TABLET, COATED ORAL at 09:22

## 2022-01-08 RX ADMIN — ENOXAPARIN SODIUM 40 MG: 100 INJECTION SUBCUTANEOUS at 09:23

## 2022-01-08 RX ADMIN — METOPROLOL SUCCINATE 25 MG: 25 TABLET, FILM COATED, EXTENDED RELEASE ORAL at 01:45

## 2022-01-08 RX ADMIN — PANTOPRAZOLE SODIUM 40 MG: 40 TABLET, DELAYED RELEASE ORAL at 06:15

## 2022-01-08 RX ADMIN — GLIPIZIDE 5 MG: 5 TABLET ORAL at 09:22

## 2022-01-08 RX ADMIN — ATORVASTATIN CALCIUM 40 MG: 40 TABLET, FILM COATED ORAL at 01:45

## 2022-01-08 RX ADMIN — LISINOPRIL 10 MG: 10 TABLET ORAL at 09:22

## 2022-01-08 ASSESSMENT — ENCOUNTER SYMPTOMS
SORE THROAT: 0
EYE PAIN: 0
BACK PAIN: 0
SHORTNESS OF BREATH: 0
COUGH: 0
BLOOD IN STOOL: 0
VOMITING: 0
RHINORRHEA: 0
ABDOMINAL PAIN: 0
NAUSEA: 0
DIARRHEA: 0

## 2022-01-08 ASSESSMENT — PAIN SCALES - GENERAL: PAINLEVEL_OUTOF10: 0

## 2022-01-08 NOTE — PROGRESS NOTES
Speech Language Pathology  Facility/Department: 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  ONSET DATE: 22    Recent Chest Xray 22  Impression   No acute process. CT of head 22  Impression   Chronic ischemic changes as above with no acute intracranial abnormality.         Date of Eval: 2022  Evaluating Therapist: TONY Chau    Current Diet level:  Current Diet : Regular  Current Liquid Diet : Thin    Primary Complaint  Patient Complaint: pt reports after he had his esophagus stretched, his swallowing improved and hasn't had problem since    Pain:  Pain Assessment  Pain Assessment: denies    Reason for Referral  Jose M Astudillo was referred for a bedside swallow evaluation to assess the efficiency of his swallow function, identify signs and symptoms of aspiration and make recommendations regarding safe dietary consistencies, effective compensatory strategies, and safe eating environment. Impression  Speech screen: Pt alert, oriented, follows commands and answers basic questions appropriately. Speech is fluent and intelligible, no word finding noted.   Cognition appears intact for pt baseline. Oral structures grossly intact. Dysphagia: Pt is edentulous, states he eats regular diet, however then reports he doesn't eat much meat. Pt states he had trouble walking to the bathroom, but now feels back to normal.  Denies any changes in speech or swallowing. Pt does recall in 2017 having dysphagia. As noted above, he reports following up with GI and had esophageal dilatation with significant improvement in swallowing. Pt states he did not have repeat MBS and denies need for this at this time. Pt analyzed with portion of breakfast tray. Pt demonstrated adequate labial seal with no anterior loss of bolus or pocketing of foods. No overt signs of aspiration emerged, voice remained clear. Swallow movement felt upon palpation of anterior neck. Recommend downgrade diet to Easy to Chew, due to edentulism and pt report of consuming softer foods at home. Dysphagia Diagnosis: Swallow function appears grossly intact  Dysphagia Outcome Severity Scale: Level 6: Within functional limits/Modified independence     Treatment Plan  Requires SLP Intervention: Yes  Duration/Frequency of Treatment: 1-2 x  D/C Recommendations: To be determined    Recommended Diet and Intervention  Diet Solids Recommendation: Easy to Chew (due to edentulism, pt agreeable to downgrade)  Liquid Consistency Recommendation: Thin  Therapeutic Interventions: Diet tolerance monitoring;Oral care; Patient/Family education    Compensatory Swallowing Strategies  Upright as possible for all oral intake    Treatment/Goals  1-The patient will tolerate recommended diet without observed clinical signs of aspiration  2-The patient/caregiver will demonstrate understanding of compensatory strategies for improved swallowing safety  1/8/22: Educated pt to purpose of visit, s/s of aspiration, concern if aspiration occurs, rationale for diet recommendation/strategies to reduce risk for aspiration and instruction to notify staff if any signs emerge. Pt   Stated comprehension  Cont goal    General  Chart Reviewed: Yes  Behavior/Cognition: Alert; Cooperative;Pleasant mood  Respiratory Status: Room air  Breath Sounds: Clear  Communication Observation: Functional  Follows Directions: Simple  Dentition: Edentulous  Patient Positioning: Upright in bed  Baseline Vocal Quality: Normal  Volitional Cough: Strong  Prior Dysphagia History: [place] [facility] [place] with the following results. D[place]phagia history  [facility] [place]  Severe Tiff-Pharyngeal Dysphagia  Pt with hx of esophageal dilation 6/19/2017, esophagram 2/2017, new right MCA infarcts 6/24/2017. ORAL phase: pt with left anterior loss of puree and thin bolus, poor mastication of mechanical soft solids 2/2 edentulous nature. Pt observed to be chewing small bite of banana for extended period of time. Reduced A-P movement of bolus, with mild lingual rocking occasionally noted. Diffuse lingual/palatal residue noted, with piecemeal swallowing of bolus. Strong gag reflex noted with initial trial of puree, resulting in strong coughing from pt, although no bolus material had entered airway, bolus material appeared to be stuck at level of valleculae, as epiglottic movement was inconsistent throughout. PHARYNGEAL phase: Reduced tongue based retraction. Bone spur noted just above pt's cervical spinal fusion, resulting in impingement of pharyngeal space, which in turn affected epiglottic retroversion during the swallow. With initial trial of puree, this resulted in poor movement of epiglottis and gagging/coughing by pt, with pt initiating 2 subsequent swallows to clear valleculae and pharyngeal space. Mildly reduced laryngeal elevation and anterior excursion were variable throughout, affecting the pharyngeal space and allowing for inconsistent epiglottic movement. (epiglottis did not fully distend with each swallow).  Premature spillage to pyriforms, with thins by cup, resulting in moderate SILENT aspiration of bolus before and during the bolus with NO COUGH REFLEX. A chin tuck was inconsistently effective with Thin and Nectar trials and therefore not a reliable compensatory strategy at this time. Deep penetration observed with nectar thick liquids. Pt appeared able to tolerate honey thick liquids via small cup sips without penetration or aspiration. Pt was turned briefly to A-P view, and it was noted that honey thick liquids appeared to be moving through upper portion of esophagus without difficulty. Pt is at high risk of aspiration with Thin liquids, nectar liquids, and soft foods at this time, likely attributed to combination of previous pharyngo-esophageal dysphagia, including bone spur impingement into pharyngeal space, and recent CVA. Recommend consider CXR (as pt has been likely aspirating thin liquids), Pureed food (Dysphagia 1) / Honey Thick Liquids. Recommend consult with ENT regarding bone spur significantly affecting pharyngeal phase of swallow. Follow-up MBSS will likely be needed at some point in future. ENT follow up 8/17/21  Jerry Randle MD - 08/18/2017 9:50 AM EDT  This gentleman is here for follow-up on his throat. He has some bone spurs in the back of his throat that were listed in a barium swallow, but he has had esophageal dilation since I last saw him and he is swallowing considerably easier. This is per both him and his wife. I think the spurs that were seen are not causing most of the problem. I can see them through the pharyngeal approach, and I think the larynx would be pulled free anterior to these since they are large spurs but not huge. I have recommended he continue with his management through the GI specialist, and he will return to see me as needed. Pt reports he had GI follow up and had esophageal dilatation.  He reports swallow improved after that and he hasn't had any problems since    Vision/Hearing  Vision  Vision: Impaired  Vision Exceptions: Wears glasses at all times  Hearing  Hearing: Within functional limits    Oral Motor Deficits  Oral/Motor  Oral Motor: Within functional limits    Prognosis  Prognosis  Prognosis for safe diet advancement: good  Individuals consulted  Consulted and agree with results and recommendations: Patient;RN    Education  Patient Education: Pt educated to purpose of visit  Patient Education Response: Verbalizes understanding  Safety Devices in place: Yes  Type of devices: Call light within reach       Therapy Time  SLP Individual Minutes  Time In: 0802  Time Out: 0825  Minutes: 23    Plan  Recommended diet: downgrade to Easy to Chew diet/htin liquids -if any s/s of aspiration emerge, or there is respiratory decline,  make NPO until further evaluated by SLP  Dc recommendation: TBD  Pt therapy goal: denies any new problems  Pt dc goal: go home  Ender Clarke M.S./[facility]SLP #[id number]  Pg.  # [id number]  Needs met prior to leaving room, call light within reach, d/w AYAN Dailey  This document will serve as a dc summary if pt dc prior to next visit   1/8/2022 9:00 AM

## 2022-01-08 NOTE — PROGRESS NOTES
RN spoke to MRI technician in regards to Pacemaker compatibility. MRI technician notified RN that assessment of the pacemaker compatibility is unable to be done until Monday. Hospitalist notified, awaiting neurology recs. Will continue to monitor.

## 2022-01-08 NOTE — PROGRESS NOTES
Patient discharged with belongings and follow up instructions. IV removed without complications. Patient transported to main entrance via wheelchair, spouse awaiting at [facility].

## 2022-01-08 NOTE — CARE COORDINATION
Case Management Assessment           Initial Evaluation                Date / Time of Evaluation: 1/8/2022 4:19 PM                 Assessment Completed by: Jody Valentin RN    Patient Name: Celena Patel     YOB: 1945  Diagnosis: TIA (transient ischemic attack) [G45.9]     Date / Time: 1/8/2022 12:48 AM    Patient Admission Status: Inpatient    If patient is discharged prior to next notation, then this note serves as note for discharge by case management. Current PCP: Marlyn Cherry MD  Clinic Patient: No    Chart Reviewed: Yes  Patient/ Family Interviewed: Yes    Initial assessment completed at bedside with: Patient/spouse    Hospitalization in the last 30 days: No    Emergency Contacts:  Extended Emergency Contact Information  [facility] Emergency Contact[facility] [address] [facility] [address] [place] [address] Phone: [phone]  Relation: Spouse  Secondary Emergency Contact: [address] Phone: [phone]  Relation: Child    Advance Directives:   Code Status: Full [address]: No  Agent: NA  Contact Number: NA    Copy present: No     In paper Chart: No    Scanned into EMR No    Financial  Payor: Eleuterio Bauman / Plan: Vinayak Guadalupe / Product Type: *No Product type* /     Pre-cert required for SNF: Yes    Pharmacy    [id number] Pamela Gupta [address] [place] [address]  Phone: [phone] Fax: [phone]      Potential assistance Purchasing Medications: Potential Assistance Purchasing Medications: No  Does Patient want to participate in local refill/ meds to beds program?:      Meds To Beds General Rules:  1. Can ONLY be done Monday- Friday between 8:30am-5pm  2. Prescription(s) must be in pharmacy by 3pm to be filled same day  3. Copy of patient's insurance/ prescription drug card and patient face sheet must be sent along with the prescription(s)  4.  Cost of Rx cannot be added to hospital bill. If financial assistance is needed, please contact unit  or ;  or  CANNOT provide pharmacy voucher for patients co-pays  5. Patients can then  the prescription on their way out of the hospital at discharge, or pharmacy can deliver to the bedside if staff is available. (payment due at time of pick-up or delivery - cash, check, or card accepted)     Able to afford home medications/ co-pay costs: Yes    ADLS  Support Systems: Spouse/Significant Other,Children    PT AM-PAC:   /  OT AM-PAC:       HOUSING  Home Environment: Single family one level home  Steps: 3 to enter    Plans to RETURN to current housing: Yes  Barriers to RETURNING to current housin [address]  Currently ACTIVE with  [employer]: No  Home Care Agency: Not Applicable    DISCHARGE PLAN:  Disposition: Home- No Services Needed    Transportation PLAN for discharge: family     Factors facilitating achievement of predicted outcomes: Family support    Barriers to discharge: NA    Additional Case Management Notes: CM met with patient and spouse at bedside, patient is from home and independent at baseline, still drives occasionally. Patient plan to return home with no needs. The Plan for Transition of Care is related to the following treatment goals of TIA (transient ischemic attack) [G45.9]    The Patient and/or patient representative Gaurav Patton and his family were provided with a choice of provider and agrees with the discharge plan No    Freedom of choice list was provided with basic dialogue that supports the patient's individualized plan of care/goals and shares the quality data associated with the providers.  No    Care Transition patient: Yes    Emma Rogers RN  [facility]  Case Management Department  Ph: [phone]

## 2022-01-08 NOTE — PROGRESS NOTES
Patient A&Ox4, VSS. Neuro checks within normal limits, NIH scale 0. Patient denies pain or nausea. Patient ambulating with standby assist, tolerating well. Patient awaiting MRI pending pacemaker compatibility. Will continue to monitor.

## 2022-01-08 NOTE — CONSULTS
Neurology Consultation Note      Patient: Heidi Wooten MRN: [id number]    YOB: 1945  Age: 67 y.o. Sex: male   Unit: [facility] Room/Bed: 8552/5637-95 Location: [address] [place] [address]    Date of Consultation: 1/8/2022  Date of Admission: 1/8/2022 12:48 AM ( LOS: 0 days )  Admitting Physician: Amber Luo    Primary Care Physician: Raul Camarillo MD   Consult Requested By: Dr. Thania Guevara, [facility] 9358     Reason for Consult: \"possible TIA\"    ASSESSMENT & RECOMMENDATIONS     Assessment  67year old man with a history of cardiac disease, HLD, DM2, HTN who presents for evaluation of self-limited episode of dizziness and weakness. He describes the episodes as feeling globally weak and dizzy when he stood up, completely resolving when he sat down and this was recurrent for a period of time. He noted absence of all symptoms when he would sit down or lay down, they were only present while standing up, specifically dizziness. He did not check his blood pressure during this time. His current exam shows no focal deficits. He has poor adherence to his medications. I discussed with him and his wife low suspicion for a cerebrovascular event. It would be unusual for a vascular event to resolve completely every time he sat down. This history is much more compatible with orthostatic changes and with his cardiac history I suspect that was the cause. He has also been non-adherent to medications which could be contributing. I offered to still complete the MRI brain, but as we both noted overall suspicion is low I think also reasonable to defer. We ultimately agreed to instead follow-up in 6-8 weeks and if he has recurrence of his events we can consider an outpatient MRI.     Recommendations  - No further inpatient neurologic work-up recommended  - he should continue his home anti-platelet therapies and HLD therapies  - Follow-up with me in 6 weeks  - I encouraged him to monitor his blood pressure at home when these events occur, and improve his adherence to home medications      SUBJECTIVE     Chief Complaint:   Possible stroke, dizziness    History of Present Illness:    Patient reports he got up from his bed and when he stood up he felt weak globally. He was also dizzy and having vertigo. Symptoms improved when he laid down, but recurred when he sat up. He felt like his symptoms resolved when he got back to the hospital. He was not feeling lightheaded or having chest pain. He felt like he was eating and drinking OK prior to this. He does take aspirin and plavix but says he frequently misses dosages. He denies any history of atrial fibrillation. He denies current focal weakness, numbness, vision loss karina/bladder issues. No unexplained fevers, rashes or weight loss. No history of recent bleeding. He reports when he had his prior stroke he felt globally weak. He notes a history of ischemic cardiomyopathy. He completed a CTA which showed 60-70% stenosis of right ICA and severe stenosis of left vertebral artery. CT head showed no evidence for a bleed. I personally reviewed these scans. Past Medical History:   has a past medical history of Abnormal echocardiogram, Abnormal stress test, Cardiomyopathy ([address] CHF (congestive heart failure) ([address] Diabetes ([address] Hyperlipidemia, and Hypertension.   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 ([address]    Esophageal stricture s/p dilatation (June 2017)    Arterial ischemic stroke, ICA, right, acute ([address]    Dilated cardiomyopathy ([address]    Cardiac resynchronization therapy defibrillator (CRT-D) in place    Prostate disease    History of CVA (cerebrovascular accident) without residual deficits    LBBB (left bundle branch block)    TIA (transient ischemic attack)       Past Surgical History:  Past Surgical History:   Procedure Laterality Date    COLONOSCOPY  07/16/2016    transverse polyp    CORONARY ARTERY BYPASS GRAFT  09/16/2016    cabg x 3    CYSTOSCOPY  03/2018    NECK SURGERY      plate in neck     UPPER GASTROINTESTINAL ENDOSCOPY  05/22/2017    esophageal stricture       Family History:  family history includes Cancer in his brother and father; Diabetes in his brother, father, and sister; Heart Disease in his father; High Blood Pressure in his father. Social History:  he reports that he has never smoked. He has never used smokeless tobacco. He reports that he does not drink alcohol and does not use drugs. Social History     Socioeconomic History    Marital status:      Spouse name: Not on file    Number of children: Not on file    Years of education: Not on file    Highest education level: Not on file   Occupational History    Not on file   Tobacco Use    Smoking status: Never Smoker    Smokeless tobacco: Never Used   Substance and Sexual Activity    Alcohol use: No    Drug use: No    Sexual activity: Not Currently   Other Topics Concern    Not on file   Social History Narrative    Not on file     Social Determinants of Health     Financial Resource Strain:     Difficulty of Paying Living Expenses: Not on file   Food Insecurity:     Worried About Running Out of Food in the Last Year: Not on file    Cornelius of Food in the Last Year: Not on file   Transportation Needs:     Lack of Transportation (Medical): Not on file    Lack of Transportation (Non-Medical):  Not on file   Physical Activity:     Days of Exercise per Week: Not on file    Minutes of Exercise per Session: Not on file   Stress:     Feeling of Stress : Not on file   Social Connections:     Frequency of Communication with Friends and Family: Not on file    Frequency of Social Gatherings with Friends and Family: Not on file    Attends Yazidism Services: Not on file    Active Member of Clubs or Organizations: Not on file    Attends Club or Organization Meetings: Not on file    Marital Status: Not on file   Intimate Partner Violence:     Fear of Current or Ex-Partner: Not on file    Emotionally Abused: Not on file    Physically Abused: Not on file    Sexually Abused: Not on file   Housing Stability:     Unable to Pay for Housing in the Last Year: Not on file    Number of Devan in the Last Year: Not on file    Unstable Housing in the Last Year: Not on file       Medications:   Allergies   Allergen Reactions    Iv Dye [Iodides] Anaphylaxis    Persantine [Dipyridamole]     Coreg [Carvedilol] Nausea And Vomiting       Medications Prior to Admission: glipiZIDE (GLUCOTROL) 5 MG tablet, Take 1 tablet by mouth 2 times daily (before meals)  digoxin (LANOXIN) 125 MCG tablet, Take 1 tablet by mouth daily  lisinopril (PRINIVIL;ZESTRIL) 5 MG tablet, Take 2 tablets by mouth daily  finasteride (PROSCAR) 5 MG tablet, Take 1 tablet by mouth daily  atorvastatin (LIPITOR) 40 MG tablet, Take 1 tablet by mouth nightly  metoprolol succinate (TOPROL XL) 25 MG extended release tablet, Take 1 tablet by mouth nightly  pantoprazole (PROTONIX) 40 MG tablet, Take 1 tablet by mouth daily  clopidogrel (PLAVIX) 75 MG tablet, Take 1 tablet by mouth daily  aspirin 81 MG tablet, Take 81 mg by mouth daily     Review of Systems:  No fevers; no fatigue; no general malaise; no visual changes/disturbances; no SOB; no cough; no CP; no palpitations; no abdominal pain; no nausea; no vomiting; no dyspepsia; no diarrhea; no constipation; no urinary complaints; no skin lesions/rashes; no myalgias; no arthralgias; no changes in mood/personality; neuro & others as per HPI    OBJECTIVE     Vitals:  Patient Vitals for the past 36 hrs:   BP Temp Temp src Pulse Resp SpO2 Weight 01/08/22 1102 118/64 98.5 °F (36.9 °C) Oral 69 16 97 %    01/08/22 0645       138 lb 14.2 oz (63 kg)   01/08/22 0644 (!) 158/76 98.2 °F (36.8 °C) Oral 69 16 98 %    01/08/22 0205 (!) 164/72 98 °F (36.7 °C) Oral 78 16 97 %    01/08/22 0049 (!) 174/72 98.1 °F (36.7 °C) Oral 84 16 98 %        Neurological Exam (performed on 1/8/2022 at 130PM):  -Mental status: A&O x3; pleasant & appropriate  -Memory: Recent & remote memory intact  -Attention: Normal  -Fund of Knowledge: Good  -Speech & Language: no aphasia; no dysarthria  -Cranial nerves: pupils 3mm->3mm bilaterally; fields intact; unable to visualize fundi; EOMI, no nystagmus; sensation intact V1, V2, V3; no facial asymmetry; hearing intact bilaterally; palate elevates symmetrically; SCMs & trapezii intact bilaterally; tongue midline  -Sensory: intact to lt touch throughout  -Motor:   RUE: 5/5, no pronator drift  RLE: 5/5  LUE: 5/5, no pronator drift  LLE: 5/5  -Tone: Normal throughout  -Reflexes: 1 & symmetric throughout  -Coordination: FNF intact  -Gait & Station: deferred for pt safety  -Other: no adventitious movements noted  Other Systems  -General Appearance: well-developed, well-nourished, no apparent distress  -Neck: supple  -Lungs: breathing unlabored, regular, no audible wheezes  -CV: pulses strong x4 extremities  -Abd: flat  -Extrem: no c/c/e      Imaging: All reports below personally reviewed & actual images reviewed where indicated. Pertinent positives & negatives are addressed in Assessment & Plan section of note  MRI brain without contrast    (Results Pending)       Other Testing:    Cultures:  NONE    Laboratory Review: All results below personally reviewed.  Pertinent positives & negatives are addressed in Assessment & Plan section of note  Recent Results (from the past 72 hour(s))   Troponin    Collection Time: 01/07/22  5:20 PM   Result Value Ref Range    Troponin <0.01 <0.01 ng/mL   Comprehensive Metabolic Panel w/ Reflex to MG Collection Time: 01/07/22  5:20 PM   Result Value Ref Range    Sodium 138 136 - 145 mmol/L    Potassium reflex Magnesium 4.4 3.5 - 5.1 mmol/L    Chloride 99 99 - 110 mmol/L    CO2 24 21 - 32 mmol/L    Anion Gap 15 3 - 16    Glucose 235 (H) 70 - 99 mg/dL    BUN 8 7 - 20 mg/dL    CREATININE 1.0 0.8 - 1.3 mg/dL    GFR Non-African American >60 >60    GFR African American >60 >60    Calcium 9.9 8.3 - 10.6 mg/dL    Total Protein 7.9 6.4 - 8.2 g/dL    Albumin 4.5 3.4 - 5.0 g/dL    Albumin/Globulin Ratio 1.3 1.1 - 2.2    Total Bilirubin 0.4 0.0 - 1.0 mg/dL    Alkaline Phosphatase 64 40 - 129 U/L    ALT 16 10 - 40 U/L    AST 23 15 - 37 U/L   CBC Auto Differential    Collection Time: 01/07/22  5:20 PM   Result Value Ref Range    WBC 11.1 (H) 4.0 - 11.0 K/uL    RBC 5.28 4.20 - 5.90 M/uL    Hemoglobin 14.8 13.5 - 17.5 g/dL    Hematocrit 45.2 40.5 - 52.5 %    MCV 85.6 80.0 - 100.0 fL    MCH 28.0 26.0 - 34.0 pg    MCHC 32.7 31.0 - 36.0 g/dL    RDW 13.8 12.4 - 15.4 %    Platelets 273 387 - 522 K/uL    MPV 8.4 5.0 - 10.5 fL    Neutrophils % 86.7 %    Lymphocytes % 9.1 %    Monocytes % 3.5 %    Eosinophils % 0.3 %    Basophils % 0.4 %    Neutrophils Absolute 9.6 (H) 1.7 - 7.7 K/uL    Lymphocytes Absolute 1.0 1.0 - 5.1 K/uL    Monocytes Absolute 0.4 0.0 - 1.3 K/uL    Eosinophils Absolute 0.0 0.0 - 0.6 K/uL    Basophils Absolute 0.0 0.0 - 0.2 K/uL   Digoxin level    Collection Time: 01/07/22  5:20 PM   Result Value Ref Range    Digoxin Lvl <0.3 (L) 0.8 - 2.0 ng/mL   EKG 12 Lead    Collection Time: 01/07/22  6:00 PM   Result Value Ref Range    Ventricular Rate 80 BPM    Atrial Rate 80 BPM    P-R Interval 180 ms    QRS Duration 120 ms    Q-T Interval 406 ms    QTc Calculation (Bazett) 468 ms    P Axis 74 degrees    R Axis 115 degrees    T Axis -63 degrees    Diagnosis       Electronic ventricular pacemakerWhen compared with ECG of 26-MAR-2018 13:03,Vent.  rate has decreased BY  31 BPM   Urinalysis Reflex to Culture    Collection Time: 01/07/22  7:40 PM    Specimen: Urine, clean catch   Result Value Ref Range    Color, UA Yellow Straw/Yellow    Clarity, UA Clear Clear    Glucose, Ur 500 (A) Negative mg/dL    Bilirubin Urine Negative Negative    Ketones, Urine Negative Negative mg/dL    Specific Gravity, UA 1.025 1.005 - 1.030    Blood, Urine Negative Negative    pH, UA 5.5 5.0 - 8.0    Protein, UA Negative Negative mg/dL    Urobilinogen, Urine 0.2 <2.0 E.U./dL    Nitrite, Urine Negative Negative    Leukocyte Esterase, Urine Negative Negative    Microscopic Examination Not Indicated     Urine Type NotGiven     Urine Reflex to Culture Not Indicated    Protime-INR    Collection Time: 01/07/22  7:40 PM   Result Value Ref Range    Protime 12.7 9.9 - 12.7 sec    INR 1.12 0.88 - 1.12   POCT Glucose    Collection Time: 01/07/22  7:41 PM   Result Value Ref Range    POC Glucose 220 (H) 70 - 99 mg/dl    Performed on ACCU-CHEK    POCT Glucose    Collection Time: 01/08/22  1:44 AM   Result Value Ref Range    POC Glucose 123 (H) 70 - 99 mg/dl    Performed on ACCU-CHEK    POCT Glucose    Collection Time: 01/08/22  7:24 AM   Result Value Ref Range    POC Glucose 112 (H) 70 - 99 mg/dl    Performed on ACCU-CHEK    POCT Glucose    Collection Time: 01/08/22 11:06 AM   Result Value Ref Range    POC Glucose 142 (H) 70 - 99 mg/dl    Performed on ACCU-CHEK        Scheduled Meds:   aspirin  81 mg Oral Daily    atorvastatin  40 mg Oral Nightly    clopidogrel  75 mg Oral Daily    digoxin  125 mcg Oral Daily    finasteride  5 mg Oral Daily    glipiZIDE  5 mg Oral BID AC    lisinopril  10 mg Oral Daily    metoprolol succinate  25 mg Oral Nightly    pantoprazole  40 mg Oral QAM AC    enoxaparin  40 mg SubCUTAneous Daily    insulin lispro  0-6 Units SubCUTAneous TID WC    insulin lispro  0-3 Units SubCUTAneous Nightly       Continuous Infusions:  dextrose         PRN Meds:  glucose, 15 g, PRN  dextrose, 12.5 g, PRN  glucagon (rDNA), 1 mg, PRN  dextrose, 100 mL/hr, PRN  ondansetron, 4 mg, Q8H PRN   Or  ondansetron, 4 mg, Q6H PRN  polyethylene glycol, 17 g, Daily PRN  perflutren lipid microspheres, 1.5 mL, ONCE PRN              Discussed at length with patient and family    Diana Orta MD  Neurology  January 8, 2022

## 2022-01-08 NOTE — PROGRESS NOTES
Pt arrived to 5510 via stretcher from [facility]. BP elevated but all other vital signs stable. NIH 0 at this time. Denies any pain at this time. 4 eyes skin assessment performed with another nurse. Pt oriented t room and call light in reach.

## 2022-01-08 NOTE — H&P
Hospital Medicine History & Physical      PCP: Bert Bishop MD    Date of Admission: 1/8/2022    Date of Service: Pt seen/examined on 1/8/2022 and Admitted to Inpatient with expected LOS greater than two midnights due to medical therapy. Chief Complaint: Vertigo      History Of Present Illness: The patient is a 67 y.o. male with medical history as below, most notable for systolic CHF with EF 65-24 %, CAD, status post CABG, previous strokes, CRT-D in place who presents to [facility] with episode of vertigo that happened at home. Patient reports standing in the middle of the night to use the bathroom and felt like things were spinning around. Sensation resolved after he laid back down but he continued to feel somewhat unwell and presented to emergency room at [place]. [facility] He underwent head CT and CTA head and neck with contrast, which showed 60 to 70% stenosis of right ICA and severe stenosis occlusion in the origin of left vertebral artery. Head CT was nonacute. Neuro vascular surgery was contacted and reviewed CTA, the impression was likely congenital abnormality without the need for acute intervention, but advised transfer for stroke work-up and MRI. At this point patient reports feeling back to his baseline. Past Medical History:        Diagnosis Date    Abnormal echocardiogram 09/2016    Abnormal stress test 09/2016    Cardiomyopathy ([facility] 09/2016    EF 23%    CHF (congestive heart failure) (HCC)     Diabetes ([facility]     Hyperlipidemia     Hypertension        Past Surgical History:        Procedure Laterality Date    COLONOSCOPY  07/16/2016    transverse polyp    CORONARY ARTERY BYPASS GRAFT  09/16/2016    cabg x 3    CYSTOSCOPY  03/2018    NECK SURGERY      plate in neck     UPPER GASTROINTESTINAL ENDOSCOPY  05/22/2017    esophageal stricture       Medications Prior to Admission:    Prior to Admission medications    Medication Sig Start Date End Date Taking? Authorizing Provider   glipiZIDE (GLUCOTROL) 5 MG tablet Take 1 tablet by mouth 2 times daily (before meals) 3/18/21  Yes Krystin Magaña MD   digoxin [facility]) 125 MCG tablet Take 1 tablet by mouth daily 1/6/20  Yes AUGUSTO Cuevas CNP   lisinopril (PRINIVIL;ZESTRIL) 5 MG tablet Take 2 tablets by mouth daily 12/19/19  Yes Krystin Magaña MD   finasteride (PROSCAR) 5 MG tablet Take 1 tablet by mouth daily 4/5/19  Yes Krystin Magaña MD   atorvastatin (LIPITOR) 40 MG tablet Take 1 tablet by mouth nightly 4/5/19  Yes Krystin Magaña MD   metoprolol succinate (TOPROL XL) 25 MG extended release tablet Take 1 tablet by mouth nightly 4/5/19  Yes Krystin Magaña MD   pantoprazole (PROTONIX) 40 MG tablet Take 1 tablet by mouth daily 4/5/19  Yes Krystin Magaña MD   clopidogrel (PLAVIX) 75 MG tablet Take 1 tablet by mouth daily 4/5/19  Yes Krystin Magaña MD   aspirin 81 MG tablet Take 81 mg by mouth daily    Yes Historical Provider, MD       Allergies: Iv dye [iodides], Persantine [dipyridamole], and Coreg [carvedilol]    Social History:  The patient currently lives at home with family    TOBACCO:   reports that he has never smoked. He has never used smokeless tobacco.  ETOH:   reports no history of alcohol use. Family History:  Reviewed in detail and  Positive as follows:        Problem Relation Age of Onset    Diabetes Father     Heart Disease Father     Cancer Father     High Blood Pressure Father     Diabetes Sister     Diabetes Brother     Cancer Brother         pancreatic       REVIEW OF SYSTEMS:   Positive and negative as noted in the HPI. All other systems reviewed and negative. PHYSICAL EXAM:    /64   Pulse 69   Temp 98.5 °F (36.9 °C) (Oral)   Resp 16   Wt 138 lb 14.2 oz (63 kg)   SpO2 97%   BMI 23.11 kg/m²     General appearance: No apparent distress appears stated age and cooperative. HEENT Normal cephalic, atraumatic without obvious deformity. Pupils equal, round, and reactive to light. Extra ocular muscles intact. Conjunctivae/corneas clear. Neck: Supple, No jugular venous distention/bruits. Trachea midline without thyromegaly or adenopathy with full range of motion. Lungs: Clear to auscultation, bilaterally without Rales/Wheezes/Rhonchi with good respiratory effort. Heart: Regular rate and rhythm with Normal S1/S2 without murmurs, rubs or gallops, point of maximum impulse non-displaced  Abdomen: Soft, non-tender or non-distended without rigidity or guarding and positive bowel sounds all four quadrants. Extremities: No clubbing, cyanosis, or edema bilaterally. Skin: Skin color, texture, turgor normal.    Neurologic: Alert and oriented X 3,  grossly non-focal.  Mental status: Alert, oriented, thought content appropriate. Capillary refill is brisk  Peripheral pulses 2+    CTA head/neck: There is 60-70% stenosis in the origin of right ICA.       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       No evidence of intracranial large vessel occlusion. EKG:  I have reviewed the EKG with the following interpretation: Sinus paced rhythm  ECHO 2020:  Summary   Left ventricular systolic function is reduced with ejection fraction   estimated at 35 -40 %. Anteroseptal wall hypokinesis. Left ventricle size is normal.   Normal left ventricular wall thickness. Grade I diastolic dysfunction with normal filling pressure. Mild posterior mitral annular calcification is present. Mild mitral regurgitation. Aortic valve appears sclerotic but opens adequately. Trivial aortic regurgitation is present. Right ventricular systolic function is moderately reduced . Mild tricuspid regurgitation. Normal systolic pulmonary artery pressure (SPAP) estimated at 30 mmHg (RA   pressure 3 mmHg).   CBC   Recent Labs     01/07/22  1720   WBC 11.1*   HGB 14.8   HCT 45.2         RENAL  Recent Labs     01/07/22  1720      K 4.4   CL 99   CO2 24   BUN 8   CREATININE 1.0     LFT'S  Recent Labs     01/07/22  1720   AST 23   ALT 16   BILITOT 0.4   ALKPHOS 64     COAG  Recent Labs     01/07/22  1940   INR 1.12     CARDIAC ENZYMES  Recent Labs     01/07/22  1720   TROPONINI <0.01       U/A:    Lab Results   Component Value Date    NITRITE neg 10/26/2021    COLORU Yellow 01/07/2022    WBCUA see below 03/26/2018    RBCUA >100 03/26/2018    BACTERIA 4+ 03/26/2018    CLARITYU Clear 01/07/2022    SPECGRAV 1.025 01/07/2022    LEUKOCYTESUR Negative 01/07/2022    BLOODU Negative 01/07/2022    GLUCOSEU 500 01/07/2022       ABG    Lab Results   Component Value Date    TBE4UQS 25.8 09/16/2016    BEART 1 09/16/2016    E7NCJJZB 98 09/16/2016    PHART 7.400 09/16/2016    TSJ8NVD 41 09/16/2016    PO2ART 105 09/16/2016    LDD5SSO 27 09/16/2016           Active Hospital Problems    Diagnosis Date Noted    TIA (transient ischemic attack) [G45.9] 01/07/2022         ASSESSMENT/PLAN:    Vertigo with concerns for TIA/CVA:  Admit for stroke work-up  Neurochecks every 4 hours, telemetry monitoring  Obtain MRI if possible given presence of CRT-D device  Already on aspirin and Plavix at home-we will continue  Follow-up A1c, LDL levels  Consider increasing statin to higher dose  Consult neurology  PT OT  Orthostatics were checked and negative    Chronic systolic cardiomyopathy:  EF 35 to 40% volume status is stable  CRT-D interrogation  Continue digoxin, lisinopril, Toprol-XL    DM type II:  Continue insulin sliding scale and glipizide      DVT Prophylaxis: Lovenox  Diet: ADULT DIET; Regular; 3 carb choices (45 gm/meal); Low Fat/Low Chol/High Fiber/2 gm Na  Code Status: Full Code  PT/OT Eval Status: Ongoing    Dispo -inpatient       Krystal Larson MD    Thank you Pankaj Gómez MD for the opportunity to be involved in this patient's care. If you have any questions or concerns please feel free to contact me at [phone].

## 2022-01-08 NOTE — ED NOTES
[facility] to facility for transport, report given by this RN. Pt stable for transport.      Uriel Srinivasan RN  01/07/22 0387

## 2022-01-08 NOTE — PLAN OF CARE
Problem: HEMODYNAMIC STATUS  Goal: Patient has stable vital signs and fluid balance  1/8/2022 1015 by Jose Mccloud RN  Outcome: Ongoing     Problem: ACTIVITY INTOLERANCE/IMPAIRED MOBILITY  Goal: Mobility/activity is maintained at optimum level for patient  1/8/2022 1015 by Jose Mccloud RN  Outcome: Ongoing     Problem: COMMUNICATION IMPAIRMENT  Goal: Ability to express needs and understand communication  Outcome: Ongoing     Problem: Falls - Risk of:  Goal: Will remain free from falls  Description: Will remain free from falls  1/8/2022 1015 by Jose Mccloud RN  Outcome: Ongoing

## 2022-01-08 NOTE — PROGRESS NOTES
4 Eyes Admission Assessment     I agree as the admission nurse that 2 RN's have performed a thorough Head to Toe Skin Assessment on the patient. ALL assessment sites listed below have been assessed on admission. Areas assessed by both nurses:  [x]   Head, Face, and Ears   [x]   Shoulders, Back, and Chest  [x]   Arms, Elbows, and Hands   [x]   Coccyx, Sacrum, and Ischium  [x]   Legs, Feet, and Heels   Blanchable redness to 2nd toes right and left     Does the Patient have Skin Breakdown?   No         Rohan Prevention initiated:  No   Wound Care Orders initiated:  No      [facility] nurse consulted for Pressure Injury (Stage 3,4, Unstageable, DTI, NWPT, and Complex wounds) or Rohan score 18 or lower:  No      Nurse 1 eSignature: Electronically signed by Kapil Simms RN on 1/8/22 at 1:02 AM EST  SHARE this note so that the co-signing nurse is able to place an eSignature**    Nurse 2 eSignature: Electronically signed by April Holly RN on 1/8/22 at 1:04 AM EST

## 2022-01-08 NOTE — PROGRESS NOTES
Pt A&O x4, VSS. No changes in neuro status this shift. NIH remains 0. Voiding adequately to bathroom and with urinal. Tolerating diet and fluids with no issues. No complaints of pain. Fall precautions in place. Pt in bed with call light in reach.

## 2022-01-08 NOTE — PLAN OF CARE
Problem: HEMODYNAMIC STATUS  Goal: Patient has stable vital signs and fluid balance  Outcome: Ongoing     Problem: ACTIVITY INTOLERANCE/IMPAIRED MOBILITY  Goal: Mobility/activity is maintained at optimum level for patient  Outcome: Ongoing     Problem: Falls - Risk of:  Goal: Will remain free from falls  Description: Will remain free from falls  Outcome: Ongoing

## 2022-01-08 NOTE — ED NOTES
Call placed to [facility] for consult to Neurosurgery at [facility], RN  01/07/22 2030    Dr. Oscar Vergara returned call to Dr. Maegan Sánchez, RN  01/07/22 2961

## 2022-01-09 LAB
ESTIMATED AVERAGE GLUCOSE: 145.6 MG/DL
HBA1C MFR BLD: 6.7 %

## 2022-01-09 NOTE — DISCHARGE SUMMARY
Hospital Medicine Discharge Summary      Patient ID: Dmitry Eric      Patient's PCP: Carolina Schneider MD    Admit Date: 1/8/2022     Discharge Date: 1/8/2022      Admitting Physician: Savita Birmingham MD    Discharge Physician: Dona Cool MD     Discharge Diagnoses: Active Hospital Problems    Diagnosis Date Noted    TIA (transient ischemic attack) [G45.9] 01/07/2022         The patient was seen and examined on day of discharge and this discharge summary is in conjunction with any daily progress note from day of discharge. Hospital Course:     Patient is a 58-year-old male with medical history of CABG, systolic cardiomyopathy with EF of 35 to 40%, also CRT-D in place, hyperlipidemia who presented to [facility] for neurological evaluation as a transfer from [address] [place] [address]. Patient had transient episode of vertigo last night after getting up from laying down to standing. He described his dizziness. He arrived to emergency room his NIH stroke scale was 0. Underwent CTA head and neck with head CT, which showed left vertebral artery stenosis. Imaging was reviewed by neurosurgery who suspected it was congenital abnormality without need for acute intervention. Patient remained stable neurologically after transfer. He was unable to go under MRI over the weekend as the settings on his device needed to be changed with cardiology and it was not available. Neurology has seen the patient and cleared him for discharge to continue his home aspirin, Plavix and statin. He will follow-up with neurology in few weeks as outpatient. Consults:     IP CONSULT TO NEUROLOGY    Disposition:  Home     Discharged Condition: Stable    Code Status: Full    Activity: As tolerated    Diet: Cardiac    Follow Up: Primary Care Physician in 1 week, neurology in 4 weeks    Exam:     General appearance: No apparent distress, appears stated age and cooperative.   Lungs: Clear to ascultation, bilaterally without Rales/Wheezes/Rhonchi with good respiratory effort. Heart: Regular rate and rhythm with Normal S1/S2 without  murmurs, rubs or gallops, point of maximum impulse non-displaced  Abdomen: Soft, non-tender or non-distended without rigidity or guarding and positive bowel sounds all four quadrants. Extremities: No clubbing, cyanosis, or edema bilaterally. Skin: Skin color, texture, turgor normal.    Neurologic: Alert and oriented X 3,  grossly non-focal.  Mental status: Alert, oriented, thought content appropriate      Labs:  For convenience and continuity at follow-up the following most recent labs are provided:    CBC:   Lab Results   Component Value Date    WBC 11.1 01/07/2022    HGB 14.8 01/07/2022    HCT 45.2 01/07/2022     01/07/2022       RENAL:   Lab Results   Component Value Date     01/07/2022    K 4.4 01/07/2022    CL 99 01/07/2022    CO2 24 01/07/2022    BUN 8 01/07/2022    CREATININE 1.0 01/07/2022           Discharge Medications:   Discharge Medication List as of 1/8/2022  4:11 PM           Details   glipiZIDE (GLUCOTROL) 5 MG tablet Take 1 tablet by mouth 2 times daily (before meals), Disp-180 tablet, R-0Print      digoxin (LANOXIN) 125 MCG tablet Take 1 tablet by mouth daily, Disp-90 tablet, R-3Print      lisinopril (PRINIVIL;ZESTRIL) 5 MG tablet Take 2 tablets by mouth daily, Disp-180 tablet, R-0Print      finasteride (PROSCAR) 5 MG tablet Take 1 tablet by mouth daily, Disp-90 tablet, R-0Print      atorvastatin (LIPITOR) 40 MG tablet Take 1 tablet by mouth nightly, Disp-90 tablet, R-3Print      metoprolol succinate (TOPROL XL) 25 MG extended release tablet Take 1 tablet by mouth nightly, Disp-30 tablet, R-11Print      pantoprazole (PROTONIX) 40 MG tablet Take 1 tablet by mouth daily, Disp-90 tablet, R-1Print      clopidogrel (PLAVIX) 75 MG tablet Take 1 tablet by mouth daily, Disp-90 tablet, R-0Print      aspirin 81 MG tablet Take 81 mg by mouth daily Historical Med

## 2022-01-09 NOTE — PROGRESS NOTES
1/8/2022 (16 hours)  The [address] [employer] [employer] transmission. Transmission shows normal sensing and pacing function. EP physician will review. See interrogation under the cardiology tab in the [facility] [address] field for more details. Will continue to monitor remotely. SVT recorded. ADDITIONAL NOTES  Patient Name: Brook Rubinstein DEVICE ASSESSMENT: Available daily battery/lead measurements within expected range. Lead trends stable. ARRHYTHMIA SUMMARY: Since last interrogation on 12-Jul-2021: 3 Monitored epsidoes of SVT most recent event on 26-Dec-2021. At device classified termination of events, arrhythmia appears to continue beneath detection rate. Ventricular sensing episodes available for review. See episode list & stored EGMs for details. OBSERVATIONS: Device appears to be currently functioning as programmed.

## 2022-01-10 ENCOUNTER — CARE COORDINATION (OUTPATIENT)
Dept: CASE MANAGEMENT | Age: 77
End: 2022-01-10

## 2022-01-10 ENCOUNTER — TELEPHONE (OUTPATIENT)
Dept: INTERNAL MEDICINE CLINIC | Age: 77
End: 2022-01-10

## 2022-01-10 ENCOUNTER — NURSE ONLY (OUTPATIENT)
Dept: CARDIOLOGY CLINIC | Age: 77
End: 2022-01-10
Payer: MEDICARE

## 2022-01-10 DIAGNOSIS — I42.0 DILATED CARDIOMYOPATHY (HCC): ICD-10-CM

## 2022-01-10 DIAGNOSIS — I50.42 CHRONIC COMBINED SYSTOLIC AND DIASTOLIC CONGESTIVE HEART FAILURE (HCC): ICD-10-CM

## 2022-01-10 DIAGNOSIS — Z95.810 CARDIAC RESYNCHRONIZATION THERAPY DEFIBRILLATOR (CRT-D) IN PLACE: ICD-10-CM

## 2022-01-10 PROCEDURE — 93297 REM INTERROG DEV EVAL ICPMS: CPT | Performed by: INTERNAL MEDICINE

## 2022-01-10 PROCEDURE — 93295 DEV INTERROG REMOTE 1/2/MLT: CPT | Performed by: INTERNAL MEDICINE

## 2022-01-10 PROCEDURE — 93296 REM INTERROG EVL PM/IDS: CPT | Performed by: INTERNAL MEDICINE

## 2022-01-10 NOTE — CARE COORDINATION
[facility] Transitions Initial Follow Up Call    Call within 2 business days of discharge: yes     Patient: Anaid Wilson Patient : 1945   MRN: [id number]   Reason for Admission: Covid 19  Discharge Date: 22 RARS: Readmission Risk Score: 10.1 ( )      Last Discharge [facility]       Complaint Diagnosis Description Type Department Provider    22   Admission (Discharged) [facility] 5T Precious Newman MD           Spoke with: [address]: [facility]      Non-face-to-face services provided:  Obtained and reviewed discharge summary and/or continuity of care documents  Education of patient/family/caregiver/guardian to support self-management-   Assessment and support for treatment adherence and medication management-      Care Transitions 24 Hour Call    Do you have any ongoing symptoms?: No  Do you have a copy of your discharge instructions?: Yes  Do you have all of your prescriptions and are they filled?: Yes (Comment:  )  Have you been contacted by a [employer] [address]?: No  Have you scheduled your follow up appointment?: No  Were you discharged with any Home Care or Post Acute Services: No  Post Acute Services: Home Health (Comment: [facility])  Patient DME: Shahab Alejandra chair, Tub transfer bench, Other  Other Patient DME: toilet raiser-but doesn't currently use any of the AD's  Do you have support at home?: Partner/Spouse/SO, Child, Grandchild  Do you feel like you have everything you need to keep you well at home?: Yes  Are you an active caregiver in your home?: No  Care Transitions Interventions     Other Services: Declined (Comment: home care referral)        Was this an external facility discharge? no  Challenges to be reviewed by the provider   Additional needs identified to be addressed with provider:   NO                    Method of communication with provider :   phone      Was this a readmission?   no    Care Transition Nurse (CTN) contacted the patient by telephone to perform post hospital discharge assessment. Verified name with patient as identifier. Provided introduction to self, and explanation of the CTN role. CTN reviewed discharge instructions, medical action plan and red flags with patient who verbalized understanding. Patient given an opportunity to ask questions and does not have any further questions or concerns at this time. Were discharge instructions available to patient? yes     Reviewed appropriate site of care based on symptoms and resources available to patient including:      PCP   Specialist   After hour contact number   [address]   When to call 911       The patient agrees to contact the PCP office for questions related to their healthcare. Covid Risk Education    Patient has following risk factors of:   DM  Heart failure   Immunocompromised     Education provided regarding infection prevention, and signs and symptoms of COVID-19 and when to seek medical attention with patient who verbalized understanding. Discussed exposure protocols and quarantine From CDC: Are you at higher risk for severe illness?   and given an opportunity for questions and concerns. The patient agrees to contact the COVID-19 hotline [phone] or PCP office for questions related to COVID-19. For more information on steps you can take to protect yourself, see CDC's How to Protect Yourself     Discussed follow-up appointments. If no appointment was previously scheduled, appointment scheduling offered:  yes, Pt declined assistance and will schedule at convenience     Is follow up appointment scheduled within 7 days of discharge? NO     Non-[facility] follow up appointment(s):  no    Plan for f/u call in - ADDITIONAL CALLS DECLINED / NOT NEEDED    CTN provided contact information for future needs. SUMMARY  CTC spoke to the Pt's daughter, Letty Fraga, who states the Pt lives with her and does not have a mobile number because he is very [facility].   She states he is doing ok and declined need for additional Covid 19 f/u call. She confirms Pt has all meds and will schedule HFU appt. Dtr declined additional calls / not needed. Pt will take all meds as prescribed and schedule / keep doctor's appt. CTC provided education on s/s that require medical attention and when to seek medical attention. CTC advised Pt of use urgent care or physician's 24 hr access line if assistance is needed after hours or on the weekend. Pt denies any needs or concerns and is agreeable with additional calls.     Follow Up  Future Appointments   Date Time Provider Arleen Raza   4/11/2022  8:40 AM SCHEDULE, Mary Corbett REMOTE TRANSM[facility]ION [facility]   4/28/2022  2:10 PM Jerica Gutiérrez MD [facility] None       Stacy Kennedy RN

## 2022-01-12 NOTE — PROGRESS NOTES
Remote transmission received for patients CRT-D. Transmission shows normal sensing and pacing function. EP physician will review. See interrogation under the cardiology tab in the [address] [place] [address] [address] field for more details. Will continue to monitor remotely. Pacing (% of Time Since 08-Jan-2022)  Total * 98.4% (MVP Off)  Effective 98.4%  Thoracic impedance trend stable. No  arrhythmias recorded since last interrogation 1/8/22.

## 2022-01-15 NOTE — PROGRESS NOTES
Physician Progress Note      Alireza Vera  CSN #:                  [id number]  :                       1945  ADMIT DATE:       2022 12:48 AM  DISCH DATE:        2022 4:51 PM  RESPONDING  PROVIDER #:        Nathen Florian MD          QUERY TEXT:    Pt admitted with vertigo and dizziness. Pt noted to have TIA, ICA stenosis. If   possible, please document in progress notes and discharge summary the   relationship, if any, between dizziness and ICA stenosis. The medical record reflects the following:  Risk Factors: Dizziness, vertigo  Clinical Indicators: head CT and CTA head and neck with contrast, which showed   60 to 70% stenosis of right ICA and severe stenosis occlusion in the origin   of left vertebral artery  Per Neurology: his history is much more compatible with orthostatic changes   and with his cardiac history I suspect that was the cause.  -Symptoms improved when he laid down, but recurred when he sat up. He felt   like his symptoms resolved when he got back to the hospital. -Orthostatics   were checked and negative  Treatment: Neuro consult, neuro checks Q4HR, MRI, asa and plavix,  Options provided:  -- Dizziness and vertigo due to TIA that is related to ICA stenosis  -- Dizziness and vertigo related to orthostatic changes  -- Other - I will add my own diagnosis  -- Disagree - Not applicable / Not valid  -- Disagree - Clinically unable to determine / Unknown  -- Refer to Clinical Documentation Reviewer    PROVIDER RESPONSE TEXT:    This patient has dizziness and vertigo related to orthostatic changes.     Query created by: Steve Grayson on 2022 10:28 AM      Electronically signed by:  Nathen Florian MD 1/15/2022 12:43 PM

## 2022-01-18 ENCOUNTER — OFFICE VISIT (OUTPATIENT)
Dept: INTERNAL MEDICINE CLINIC | Age: 77
End: 2022-01-18

## 2022-01-18 VITALS
RESPIRATION RATE: 18 BRPM | HEIGHT: 65 IN | SYSTOLIC BLOOD PRESSURE: 95 MMHG | HEART RATE: 62 BPM | BODY MASS INDEX: 22.33 KG/M2 | DIASTOLIC BLOOD PRESSURE: 68 MMHG | WEIGHT: 134 LBS

## 2022-01-18 DIAGNOSIS — R42 VERTIGO: Primary | ICD-10-CM

## 2022-01-18 DIAGNOSIS — Z09 HOSPITAL DISCHARGE FOLLOW-UP: ICD-10-CM

## 2022-01-18 DIAGNOSIS — G45.9 TIA (TRANSIENT ISCHEMIC ATTACK): ICD-10-CM

## 2022-01-18 PROCEDURE — 1111F DSCHRG MED/CURRENT MED MERGE: CPT | Performed by: INTERNAL MEDICINE

## 2022-01-18 PROCEDURE — 99495 TRANSJ CARE MGMT MOD F2F 14D: CPT | Performed by: INTERNAL MEDICINE

## 2022-01-18 RX ORDER — MECLIZINE HCL 12.5 MG/1
12.5 TABLET ORAL 3 TIMES DAILY PRN
Qty: 30 TABLET | Refills: 0 | Status: SHIPPED | OUTPATIENT
Start: 2022-01-18 | End: 2022-01-28

## 2022-01-18 NOTE — PROGRESS NOTES
ANTIVERT  Take 1 tablet by mouth 3 times daily as needed for Dizziness  Started by: Vidal Wyman MD        CONTINUE taking these medications    aspirin 81 MG tablet     atorvastatin 40 MG tablet  Commonly known as: LIPITOR  Take 1 tablet by mouth nightly     clopidogrel 75 MG tablet  Commonly known as: Plavix  Take 1 tablet by mouth daily     digoxin 125 MCG tablet  Commonly known as: LANOXIN  Take 1 tablet by mouth daily     finasteride 5 MG tablet  Commonly known as: PROSCAR  Take 1 tablet by mouth daily     glipiZIDE 5 MG tablet  Commonly known as: GLUCOTROL  Take 1 tablet by mouth 2 times daily (before meals)     lisinopril 5 MG tablet  Commonly known as: PRINIVIL;ZESTRIL  Take 2 tablets by mouth daily     metoprolol succinate 25 MG extended release tablet  Commonly known as:  Toprol XL  Take 1 tablet by mouth nightly     pantoprazole 40 MG tablet  Commonly known as: PROTONIX  Take 1 tablet by mouth daily           Where to Get Your Medications      These medications were sent to [address] [place] [address] [address], [facility] [phone]  [address] [place] [address]    Phone: [phone]   · meclizine 12.5 MG tablet           Medications marked \"taking\" at this time  Outpatient Medications Marked as Taking for the 1/18/22 encounter (Office Visit) with Vidal Wyman MD   Medication Sig Dispense Refill    meclizine (ANTIVERT) 12.5 MG tablet Take 1 tablet by mouth 3 times daily as needed for Dizziness 30 tablet 0        Medications patient taking as of now reconciled against medications ordered at time of hospital discharge: Yes    Chief Complaint   Patient presents with   Claudia Morris from Hospital       HPI   67 y.o. male with hx of  DM - 2,  Ischemic cardiomyopathy, s.p CABG, AICD , HTn, CVA , prostate enlargement here for recent hospital visit and dc    About 10 days ago, pt developed sudden onset of dizziness about 11 am when he stood up from bed, felt room is spinning around him, could not stand up straight and went back to bed and felt better. Symptoms continued to happen with standing and was taken to clinical [facility] where pt signed out in an hour due to unclear reasons without workup . Later seen developed severe nausea along with dizziness and had vomiting at home prompting to ER visit at [facility] where ct head did not show any acute stroke, CTA with vertebral artery stenosis admitted to [facility]. 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 Has chronic tinnitus and hearing loss. Family reports nausea and loss of appetite      Inpatient course: Discharge summary reviewed- see chart. Interval history/Current status: home with family     Review of Systems   As above    Vitals:    01/18/22 0831   Weight: 134 lb (60.8 kg)   Height: 5' 5\" (1.651 m)     Body mass index is 22.3 kg/m². Wt Readings from Last 3 Encounters:   01/18/22 134 lb (60.8 kg)   01/08/22 138 lb 14.2 oz (63 kg)   01/07/22 142 lb (64.4 kg)     BP Readings from Last 3 Encounters:   01/08/22 126/72   01/07/22 (!) 150/76   12/20/21 138/78       Physical Exam  Vitals:    01/18/22 0831   BP: 95/68   Pulse: 62   Resp: 18           General:  eldelry male,  Thin built  Awake, alert and oriented. Appears to be not in any distress  Mucous Membranes:  Pink , anicteric  Neck: No JVD, no carotid bruit, no thyromegaly  Chest:  Clear to auscultation bilaterally, no added sounds, left AICD   Cardiovascular:  RRR S1S2 heard, no murmurs or gallops  Abdomen:  Soft, undistended, non tender, no organomegaly, BS present  Extremities: No edema or cyanosis.  Distal pulses well felt  Neurological : grossly normal  CN 2 to 12 intact, coordination normal  Hearing def noted  Non focal   Dizziness not reproducible today   No nystagmus          CTA head and neck    There is 60-70% stenosis in the origin of right ICA.       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       No evidence of intracranial large vessel occlusion.         Ct head    Chronic ischemic changes as above with no acute intracranial abnormality.       Generalized atrophy.         cxr    No acute process. EKG    Wt Readings from Last 3 Encounters:   01/18/22 134 lb (60.8 kg)   01/08/22 138 lb 14.2 oz (63 kg)   01/07/22 142 lb (64.4 kg)       Assessment/Plan:   Diagnosis Orders   1. Vertigo  [facility]   2. TIA (transient ischemic attack)     3.  Hospital discharge follow-up  [facility]     Symptoms appear to be from vertigo rather than TIA especially workup is neg and pt still with positional symptoms  Refer to PT at [facility]  Pt and family reassured    Chronic hypotension with no symptoms  Consider cutting down meds     Medical Decision Making: moderate complexity

## 2022-01-21 ENCOUNTER — HOSPITAL ENCOUNTER (EMERGENCY)
Age: 77
Discharge: HOME | End: 2022-01-21
Attending: STUDENT IN AN ORGANIZED HEALTH CARE EDUCATION/TRAINING PROGRAM
Payer: MEDICARE

## 2022-01-21 ENCOUNTER — APPOINTMENT (OUTPATIENT)
Dept: GENERAL RADIOLOGY | Age: 77
End: 2022-01-21
Payer: MEDICARE

## 2022-01-21 VITALS
HEART RATE: 84 BPM | WEIGHT: 130 LBS | OXYGEN SATURATION: 96 % | TEMPERATURE: 98.4 F | BODY MASS INDEX: 21.63 KG/M2 | SYSTOLIC BLOOD PRESSURE: 118 MMHG | RESPIRATION RATE: 23 BRPM | DIASTOLIC BLOOD PRESSURE: 67 MMHG

## 2022-01-21 DIAGNOSIS — R65.10 SIRS (SYSTEMIC INFLAMMATORY RESPONSE SYNDROME) (HCC): ICD-10-CM

## 2022-01-21 DIAGNOSIS — U07.1 COVID-19: Primary | ICD-10-CM

## 2022-01-21 LAB
A/G RATIO: 1 (ref 1.1–2.2)
ALBUMIN SERPL-MCNC: 4 G/DL (ref 3.4–5)
ALP BLD-CCNC: 58 U/L (ref 40–129)
ALT SERPL-CCNC: 18 U/L (ref 10–40)
ANION GAP SERPL CALCULATED.3IONS-SCNC: 12 MMOL/L (ref 3–16)
AST SERPL-CCNC: 37 U/L (ref 15–37)
BASOPHILS ABSOLUTE: 0 K/UL (ref 0–0.2)
BASOPHILS RELATIVE PERCENT: 0.1 %
BILIRUB SERPL-MCNC: 0.5 MG/DL (ref 0–1)
BUN BLDV-MCNC: 24 MG/DL (ref 7–20)
CALCIUM SERPL-MCNC: 9.4 MG/DL (ref 8.3–10.6)
CHLORIDE BLD-SCNC: 97 MMOL/L (ref 99–110)
CHP ED QC CHECK: YES
CO2: 25 MMOL/L (ref 21–32)
CREAT SERPL-MCNC: 1.3 MG/DL (ref 0.8–1.3)
EOSINOPHILS ABSOLUTE: 0 K/UL (ref 0–0.6)
EOSINOPHILS RELATIVE PERCENT: 0.1 %
GFR AFRICAN AMERICAN: >60
GFR NON-AFRICAN AMERICAN: 54
GLUCOSE BLD-MCNC: 111 MG/DL (ref 70–99)
GLUCOSE BLD-MCNC: 127 MG/DL (ref 70–99)
GLUCOSE BLD-MCNC: 57 MG/DL (ref 70–99)
GLUCOSE BLD-MCNC: 83 MG/DL
GLUCOSE BLD-MCNC: 83 MG/DL (ref 70–99)
GLUCOSE BLD-MCNC: 90 MG/DL (ref 70–99)
HCT VFR BLD CALC: 40.8 % (ref 40.5–52.5)
HEMOGLOBIN: 13.6 G/DL (ref 13.5–17.5)
INFLUENZA A: NOT DETECTED
INFLUENZA B: NOT DETECTED
LACTIC ACID, SEPSIS: 1.7 MMOL/L (ref 0.4–1.9)
LYMPHOCYTES ABSOLUTE: 0.4 K/UL (ref 1–5.1)
LYMPHOCYTES RELATIVE PERCENT: 8.4 %
MCH RBC QN AUTO: 28.1 PG (ref 26–34)
MCHC RBC AUTO-ENTMCNC: 33.4 G/DL (ref 31–36)
MCV RBC AUTO: 84.1 FL (ref 80–100)
MONOCYTES ABSOLUTE: 0.4 K/UL (ref 0–1.3)
MONOCYTES RELATIVE PERCENT: 7.9 %
NEUTROPHILS ABSOLUTE: 4.4 K/UL (ref 1.7–7.7)
NEUTROPHILS RELATIVE PERCENT: 83.5 %
PDW BLD-RTO: 13.4 % (ref 12.4–15.4)
PERFORMED ON: ABNORMAL
PERFORMED ON: NORMAL
PLATELET # BLD: 183 K/UL (ref 135–450)
PMV BLD AUTO: 8.3 FL (ref 5–10.5)
POTASSIUM REFLEX MAGNESIUM: 4.2 MMOL/L (ref 3.5–5.1)
PRO-BNP: 100 PG/ML (ref 0–449)
PROCALCITONIN: 0.09 NG/ML (ref 0–0.15)
RBC # BLD: 4.85 M/UL (ref 4.2–5.9)
SARS-COV-2 RNA, RT PCR: DETECTED
SODIUM BLD-SCNC: 134 MMOL/L (ref 136–145)
TOTAL PROTEIN: 8 G/DL (ref 6.4–8.2)
TROPONIN: <0.01 NG/ML
WBC # BLD: 5.2 K/UL (ref 4–11)

## 2022-01-21 PROCEDURE — 2580000003 HC RX 258: Performed by: STUDENT IN AN ORGANIZED HEALTH CARE EDUCATION/TRAINING PROGRAM

## 2022-01-21 PROCEDURE — 96361 HYDRATE IV INFUSION ADD-ON: CPT

## 2022-01-21 PROCEDURE — 83605 ASSAY OF LACTIC ACID: CPT

## 2022-01-21 PROCEDURE — 85025 COMPLETE CBC W/AUTO DIFF WBC: CPT

## 2022-01-21 PROCEDURE — 99283 EMERGENCY DEPT VISIT LOW MDM: CPT

## 2022-01-21 PROCEDURE — 84484 ASSAY OF TROPONIN QUANT: CPT

## 2022-01-21 PROCEDURE — 87040 BLOOD CULTURE FOR BACTERIA: CPT

## 2022-01-21 PROCEDURE — 84145 PROCALCITONIN (PCT): CPT

## 2022-01-21 PROCEDURE — 96374 THER/PROPH/DIAG INJ IV PUSH: CPT

## 2022-01-21 PROCEDURE — 2580000003 HC RX 258: Performed by: NURSE PRACTITIONER

## 2022-01-21 PROCEDURE — 87636 SARSCOV2 & INF A&B AMP PRB: CPT

## 2022-01-21 PROCEDURE — 87150 DNA/RNA AMPLIFIED PROBE: CPT

## 2022-01-21 PROCEDURE — 71045 X-RAY EXAM CHEST 1 VIEW: CPT

## 2022-01-21 PROCEDURE — 6370000000 HC RX 637 (ALT 250 FOR IP): Performed by: NURSE PRACTITIONER

## 2022-01-21 PROCEDURE — 80053 COMPREHEN METABOLIC PANEL: CPT

## 2022-01-21 PROCEDURE — 83880 ASSAY OF NATRIURETIC PEPTIDE: CPT

## 2022-01-21 PROCEDURE — 93005 ELECTROCARDIOGRAM TRACING: CPT | Performed by: NURSE PRACTITIONER

## 2022-01-21 RX ORDER — DEXTROSE MONOHYDRATE 25 G/50ML
25 INJECTION, SOLUTION INTRAVENOUS ONCE
Status: DISCONTINUED | OUTPATIENT
Start: 2022-01-21 | End: 2022-01-21 | Stop reason: CLARIF

## 2022-01-21 RX ORDER — ACETAMINOPHEN 325 MG/1
650 TABLET ORAL ONCE
Status: COMPLETED | OUTPATIENT
Start: 2022-01-21 | End: 2022-01-21

## 2022-01-21 RX ORDER — SODIUM CHLORIDE 0.9 % (FLUSH) 0.9 %
5-40 SYRINGE (ML) INJECTION PRN
Status: DISCONTINUED | OUTPATIENT
Start: 2022-01-21 | End: 2022-01-22 | Stop reason: HOSPADM

## 2022-01-21 RX ORDER — SODIUM CHLORIDE 9 MG/ML
25 INJECTION, SOLUTION INTRAVENOUS PRN
Status: DISCONTINUED | OUTPATIENT
Start: 2022-01-21 | End: 2022-01-22 | Stop reason: HOSPADM

## 2022-01-21 RX ORDER — SODIUM CHLORIDE, SODIUM LACTATE, POTASSIUM CHLORIDE, AND CALCIUM CHLORIDE .6; .31; .03; .02 G/100ML; G/100ML; G/100ML; G/100ML
30 INJECTION, SOLUTION INTRAVENOUS ONCE
Status: COMPLETED | OUTPATIENT
Start: 2022-01-21 | End: 2022-01-21

## 2022-01-21 RX ORDER — SODIUM CHLORIDE 0.9 % (FLUSH) 0.9 %
5-40 SYRINGE (ML) INJECTION EVERY 12 HOURS SCHEDULED
Status: DISCONTINUED | OUTPATIENT
Start: 2022-01-21 | End: 2022-01-22 | Stop reason: HOSPADM

## 2022-01-21 RX ADMIN — ACETAMINOPHEN 650 MG: 325 TABLET ORAL at 20:35

## 2022-01-21 RX ADMIN — SODIUM CHLORIDE, POTASSIUM CHLORIDE, SODIUM LACTATE AND CALCIUM CHLORIDE 1770 ML: 600; 310; 30; 20 INJECTION, SOLUTION INTRAVENOUS at 21:13

## 2022-01-21 RX ADMIN — DEXTROSE MONOHYDRATE 125 ML: 100 INJECTION, SOLUTION INTRAVENOUS at 21:39

## 2022-01-21 ASSESSMENT — ENCOUNTER SYMPTOMS
RHINORRHEA: 0
ABDOMINAL PAIN: 0
DIARRHEA: 0
COLOR CHANGE: 0
NAUSEA: 1
VOMITING: 0
SORE THROAT: 0
SHORTNESS OF BREATH: 0

## 2022-01-21 ASSESSMENT — PAIN SCALES - GENERAL: PAINLEVEL_OUTOF10: 7

## 2022-01-21 NOTE — Clinical Note
Poli Eric was seen and treated in our emergency department on 1/21/2022. He may return to work on 01/26/2022. If you have any questions or concerns, please don't hesitate to call.       Wang Quivers, DO

## 2022-01-21 NOTE — ED PROVIDER NOTES
[address] ED  EMERGENCY DEPARTMENT ENCOUNTER        Pt Name: Antonio Leger  MRN: [id number]  Demetragfkyra [birth year]  Dateof evaluation: 1/21/2022  Provider: Yokasta Butts, APRN - CNP  PCP: Toi Phan MD  ED Attending: No att. providers found    [facility]       Chief Complaint   Patient presents with    Fatigue     pt w family, reports recent neg workup for cva in the last 2 weeks. since then increased weakness. decreased PO intake. low bs, weight loss, dry cough in triage. fever noted. denies hx of covid, no vaccine. ill appearing. HISTORY OF PRESENTILLNESS   (Location/Symptom, Timing/Onset, Context/Setting, Quality, Duration, Modifying Factors, Severity)  Note limiting factors. Antonio Leger is a 67 y.o. male for fatigue. Onset was over the last 2 weeks. Context includes family reports patient has had fatigue and decreased p.o. intake for the last 2 weeks. He denies any abdominal pain or chest pain but states he has had some nausea. Alleviating factors include nothing. Aggravating factors include nothing. Pain is 0/10. Nothing has been used for pain today. Nursing Notes were all reviewed and agreed with or any disagreements were addressed  in the HPI. REVIEW OF SYSTEMS    (2-9 systems for level 4, 10 or more for level 5)     Review of Systems   Constitutional: Positive for fatigue. Negative for fever. HENT: Negative for congestion, rhinorrhea and sore throat. Respiratory: Negative for shortness of breath. Cardiovascular: Negative for chest pain. Gastrointestinal: Positive for nausea. Negative for abdominal pain, diarrhea and vomiting. Genitourinary: Negative for decreased urine volume and difficulty urinating. Musculoskeletal: Negative for arthralgias and myalgias. Skin: Negative for color change and rash. Neurological: Negative for dizziness and light-headedness. Psychiatric/Behavioral: Negative for agitation.    All other systems reviewed and are negative. Positives and Pertinent negatives as per HPI. Except as noted above in the ROS, all other systems were reviewed and negative.        PAST MEDICAL HISTORY     Past Medical History:   Diagnosis Date    Abnormal echocardiogram 09/2016    Abnormal stress test 09/2016    Cardiomyopathy ([facility] 09/2016    EF 23%    CHF (congestive heart failure) ([facility]     Diabetes ([facility]     Hyperlipidemia     Hypertension          SURGICAL HISTORY       Past Surgical History:   Procedure Laterality Date    COLONOSCOPY  07/16/2016    transverse polyp    CORONARY ARTERY BYPASS GRAFT  09/16/2016    cabg x 3    CYSTOSCOPY  03/2018    NECK SURGERY      plate in neck     UPPER GASTROINTESTINAL ENDOSCOPY  05/22/2017    esophageal stricture         CURRENT MEDICATIONS       Discharge Medication List as of 1/21/2022 11:32 PM      CONTINUE these medications which have NOT CHANGED    Details   meclizine (ANTIVERT) 12.5 MG tablet Take 1 tablet by mouth 3 times daily as needed for Dizziness, Disp-30 tablet, R-0Normal      glipiZIDE (GLUCOTROL) 5 MG tablet Take 1 tablet by mouth 2 times daily (before meals), Disp-180 tablet, R-0Print      digoxin (LANOXIN) 125 MCG tablet Take 1 tablet by mouth daily, Disp-90 tablet, R-3Print      lisinopril (PRINIVIL;ZESTRIL) 5 MG tablet Take 2 tablets by mouth daily, Disp-180 tablet, R-0Print      finasteride (PROSCAR) 5 MG tablet Take 1 tablet by mouth daily, Disp-90 tablet, R-0Print      atorvastatin (LIPITOR) 40 MG tablet Take 1 tablet by mouth nightly, Disp-90 tablet, R-3Print      metoprolol succinate (TOPROL XL) 25 MG extended release tablet Take 1 tablet by mouth nightly, Disp-30 tablet, R-11Print      pantoprazole (PROTONIX) 40 MG tablet Take 1 tablet by mouth daily, Disp-90 tablet, R-1Print      clopidogrel (PLAVIX) 75 MG tablet Take 1 tablet by mouth daily, Disp-90 tablet, R-0Print      aspirin 81 MG tablet Take 81 mg by mouth daily Historical Med ALLERGIES     Iv dye [iodides], Persantine [dipyridamole], and Coreg [carvedilol]    FAMILY HISTORY       Family History   Problem Relation Age of Onset    Diabetes Father     Heart Disease Father     Cancer Father     High Blood Pressure Father     Diabetes Sister     Diabetes Brother     Cancer Brother         pancreatic          SOCIAL HISTORY       Social History     Socioeconomic History    Marital status:      Spouse name: None    Number of children: None    Years of education: None    Highest education level: None   Occupational History    None   Tobacco Use    Smoking status: Never Smoker    Smokeless tobacco: Never Used   Substance and Sexual Activity    Alcohol use: No    Drug use: No    Sexual activity: Not Currently   Other Topics Concern    None   Social History Narrative    None     Social Determinants of Health     Financial Resource Strain:     Difficulty of Paying Living Expenses: Not on file   Food Insecurity:     Worried About Running Out of Food in the Last Year: Not on file    Cornelius of Food in the Last Year: Not on file   Transportation Needs:     Lack of Transportation (Medical): Not on file    Lack of Transportation (Non-Medical):  Not on file   Physical Activity:     Days of Exercise per Week: Not on file    Minutes of Exercise per Session: Not on file   Stress:     Feeling of Stress : Not on file   Social Connections:     Frequency of Communication with Friends and Family: Not on file    Frequency of Social Gatherings with Friends and Family: Not on file    Attends Yarsanism Services: Not on file    Active Member of Clubs or Organizations: Not on file    Attends Club or Organization Meetings: Not on file    Marital Status: Not on file   Intimate Partner Violence:     Fear of Current or Ex-Partner: Not on file    Emotionally Abused: Not on file    Physically Abused: Not on file    Sexually Abused: Not on file   Housing Stability:     Unable to Pay for Housing in the Last Year: Not on file    Number of Places Lived in the Last Year: Not on file    Unstable Housing in the Last Year: Not on file       SCREENINGS             PHYSICAL EXAM  (up to 7 for level 4, 8 or more for level 5)     ED Triage Vitals [01/21/22 1740]   BP Temp Temp src Pulse Resp SpO2 Height Weight   128/75 101.2 °F (38.4 °C) -- 104 20 96 % -- 130 lb (59 kg)       Physical Exam  Constitutional:       Appearance: He is well-developed. HENT:      Head: Normocephalic and atraumatic. Cardiovascular:      Rate and Rhythm: Tachycardia present. Pulmonary:      Effort: Pulmonary effort is normal. No respiratory distress. Abdominal:      General: There is no distension. Palpations: Abdomen is soft. Tenderness: There is no abdominal tenderness. Musculoskeletal:         General: Normal range of motion. Cervical back: Normal range of motion. Skin:     General: Skin is warm and dry. Neurological:      Mental Status: He is alert and oriented to person, place, and time. DIAGNOSTIC RESULTS   LABS:    Labs Reviewed   CULTURE, BLOOD 2 - Abnormal; Notable for the following components:       Result Value    Organism Staphylococcus coagulase negative DNA Detected (*)     Organism Staphylococcus coagulase-negative (*)     All other components within normal limits    Narrative:     ORDER#: [id number]                          ORDERED BY: [address] [place] [address]  SOURCE: Blood Antecubital-Rig              COLLECTED:  01/21/22 19:20  ANTIBIOTICS AT PIERRE.:                      RECEIVED :  01/22/22 02:15  CALL  [place]  SC[facility] tel. [phone],  Microbiology results called to and read back by AYAN Martinez. , 01/23/2022  00:59, by Cecile Mccarthy  If child <=2 yrs old please draw pediatric bottle. ~Blood Culture #2  Performed at:  [facility]  [address], [facility] [place] [address]   Phone [phone]   COVID-19 & INFLUENZA COMBO - Abnormal; Notable for the following components:    SARS-CoV-2 RNA, RT PCR DETECTED (*)     All other components within normal limits    Narrative:     Virgil Sheths  SCETROY tel. [phone],  Microbiology results called to and read back by Janina yoon RN,  01/21/2022 20:16, by [facility]  Performed at:  [facility]  [address],  [place], [place]   Phone [phone]   CBC WITH AUTO DIFFERENTIAL - Abnormal; Notable for the following components:    Lymphocytes Absolute 0.4 (*)     All other components within normal limits    Narrative:     Performed at:  [facility]  [address],  [place], [place]   Phone [phone]   COMPREHENSIVE METABOLIC PANEL W/ REFLEX TO MG FOR LOW K - Abnormal; Notable for the following components:    Sodium 134 (*)     Chloride 97 (*)     BUN 24 (*)     GFR Non-African American 54 (*)     Albumin/Globulin Ratio 1.0 (*)     All other components within normal limits    Narrative:     Performed at:  [facility]  [address],  [place], [place]   Phone [phone]   POCT GLUCOSE - Abnormal; Notable for the following components:    POC Glucose 57 (*)     All other components within normal limits    Narrative:     Performed at:  [facility]  [address],  [place], [place]   Phone [phone]   POCT GLUCOSE - Abnormal; Notable for the following components:    POC Glucose 111 (*)     All other components within normal limits    Narrative:     Performed at:  [facility]  [address],  [place], [place]   Phone [phone]   POCT GLUCOSE - Abnormal; Notable for the following components:    POC Glucose 127 (*)     All other components within normal limits    Narrative:     Performed at:  [facility]  [address],  [place], [place]   Phone [phone]   POCT GLUCOSE - Normal    Narrative:     Performed at:  [facility],  [place], [facility]   Phone [phone]   CULTURE, BLOOD 1    Narrative:     ORDER#: [id number]                          ORDERED BY: Leyla George  SOURCE: Blood l forearm                    COLLECTED:  01/21/22 18:30  ANTIBIOTICS AT PIERRE.:                      RECEIVED :  01/22/22 02:15  If child <=2 yrs old please draw pediatric bottle. ~Blood Culture 1  Performed at:  [facility]  [address], [place] [facility] [place] [address]   Phone [phone]   CULTURE, BLOOD, PCR ID PANEL RESULTS REPORT    Narrative:     Rm Herndon  MILTON tel. [phone],  Microbiology results called to and read back by AYAN Choi , 01/23/2022  00:59, by Napoleon Elizabeth  Referred out by:  [facility]  [address] [facility] [employer] [address]   Phone [phone]   PROCALCITONIN    Narrative:     Performed at:  [facility],  [place], [facility]   Phone [phone]   LACTATE, SEPSIS    Narrative:     Performed at:  [facility],  [place], [facility]   Phone [phone]   TROPONIN    Narrative:     Performed at:  [facility],  [place], [facility]   Phone [phone]   BRAIN NATRIURETIC PEPTIDE    Narrative:     Performed at:  [facility],  [place], [facility]   Phone [phone]   LACTATE, SEPSIS   URINE RT REFLEX TO CULTURE       All other labs werewithin normal range or not returned as of this dictation. EKG: All EKG's are interpreted by the Emergency Department Physician who either signs or Co-signs this chart in the absence of a cardiologist.  Please see their note for interpretation of EKG.       RADIOLOGY:     Chest x-ray interpreted by radiologist for no acute process. Interpretation per the Radiologist below, if available at the time of this note:    XR CHEST 1 VIEW   Final Result   No acute process. No results found. PROCEDURES   Unless otherwise noted below, none     Procedures     CRITICAL CARE TIME   N/A    CONSULTS:  None      EMERGENCYDEPARTMENT COURSE and DIFFERENTIAL DIAGNOSIS/MDM:   Vitals:    Vitals:    01/21/22 2327 01/21/22 2328 01/21/22 2329 01/21/22 2330   BP:       Pulse: 77 77 77 84   Resp: 19 19 18 23   Temp:       TempSrc:       SpO2: 95% 95% 95% 96%   Weight:           Patient was given the following medications:  Medications   lactated ringers bolus (0 mLs IntraVENous Stopped 1/21/22 2331)   acetaminophen (TYLENOL) tablet 650 mg (650 mg Oral Given 1/21/22 2035)       Patient was seen evaluated by myself and Dr Larry Peck. Patient here for concerns for fatigue. Family reports that he has recently been seen in evaluated for CVA. They report that in the last 2 weeks has had decreased oral intake and increased fatigue. Eyes any chest pain or shortness of breath. Patient does not have any abdominal pain. He reports some nausea. Patient denies any fevers however he was febrile here. Patient did have a heart rate of 104 and a temperature of 101.2 sepsis protocol initiated on arrival with IV fluids and lab work. Patient did test positive for COVID. Patient was ambulated in the ED. Dr. Larry Peck to follow-up on ambulation and final disposition of the patient. The patient tolerated their visit well. I have evaluated this patient. My supervising physician was available for consultation. The patient and / or the family were informed of the results of any tests, a time was given to answer questions, a plan was proposed and they agreed with plan. FINAL IMPRESSION      1. COVID-19    2.  SIRS (systemic inflammatory response syndrome) ([facility]          DISPOSITION/PLAN   DISPOSITION        PATIENT REFERRED TO:  Lucia Liriano Kimberlee YoderTony [facility]  [facility]  [phone]    Call in 3 days  As needed      DISCHARGE MEDICATIONS:  Discharge Medication List as of 1/21/2022 11:32 PM          DISCONTINUED MEDICATIONS:  Discharge Medication List as of 1/21/2022 11:32 PM                 (Please note that portions of this note were completed with a voice recognition program.  Efforts were made to edit the dictations but occasionally words are mis-transcribed.)    AUGUSTO Shaw CNP (electronically signed)         AUGUSTO Shaw CNP  01/28/22 160

## 2022-01-22 LAB
EKG ATRIAL RATE: 93 BPM
EKG DIAGNOSIS: NORMAL
EKG P AXIS: 62 DEGREES
EKG P-R INTERVAL: 162 MS
EKG Q-T INTERVAL: 380 MS
EKG QRS DURATION: 126 MS
EKG QTC CALCULATION (BAZETT): 472 MS
EKG R AXIS: 135 DEGREES
EKG T AXIS: -12 DEGREES
EKG VENTRICULAR RATE: 93 BPM

## 2022-01-22 PROCEDURE — 93010 ELECTROCARDIOGRAM REPORT: CPT | Performed by: INTERNAL MEDICINE

## 2022-01-22 NOTE — ED PROVIDER NOTES
I independently examined and evaluated Heidi Wooten. I personally saw the patient and performed a substantive portion of the visit including all aspects of the medical decision making. In brief, this 76-year-old male is presenting with fatigue and weakness for the last weeks. He has been feeling significantly worse over the last few days and has been eating very little. He denies any chest pain, abdominal pain, nausea, vomiting, shortness of breath. Focused exam revealed   General: Alert, no acute distress, patient resting comfortably   Skin: warm, intact, no pallor noted   Head: Normocephalic, atraumatic   Eye: Normal conjunctiva   Cardiac: Normal peripheral perfusion  Respiratory: No acute distress   Musculoskeletal: No deformity, full ROM. Neurological: alert and oriented, normal sensory and motor observed. Psychiatric: Cooperative    ED course: Patient appeared very ill upon arrival, however he was noted to be febrile. Fevers treated with Tylenol. He also was hypoglycemic and was given IV dextrose with improvement. During my evaluation the patient is afebrile and states that he is feeling significantly improved. We discussed his lack of p.o. intake recently and he states that he is has not been feeling like eating his been feeling poorly. I asked if he felt that he could eat now, which he stated he felt well enough to eat at this point. He was ambulated in the ED without desaturation. Glucose was repeated twice, both uptrending in the low 100s. At this point I believe he is stable for discharge with COVID quarantine instructions. He will be using Tylenol and ibuprofen as needed for pain and fevers. He is encouraged increase clear fluid intake. He is given return precautions for chest pain, severe shortness of breath, nausea and vomiting. He and his daughter verbalized understanding and agreed with this plan.     All diagnostic, treatment, and disposition decisions were made by myself in conjunction with the advanced practice provider. For all further details of the patient's emergency department visit, please see the advanced practice provider's documentation. Comment: Please note this report has been produced using speech recognition software and may contain errors related to that system including errors in grammar, punctuation, and spelling, as well as words and phrases that may be inappropriate. If there are any questions or concerns please feel free to contact the dictating provider for clarification. ECG    The Ekg interpreted by me shows Paced rhythm with a rate of 93 bpm.  Right axis deviation. No acute injury pattern. , QTc 472.        Terressa Dario, DO  01/21/22 2047       Terressa Dario, DO  01/21/22 2340

## 2022-01-22 NOTE — ED NOTES
Pt ambulated w/ pulse ox on RA. Pt O2 sat remained 95% for duration of ambulation and upon return to room. Bailey Blanchard NP, and Dr. Larry Peck notified.       Jaydon Rodarte RN  01/21/22 7084

## 2022-01-23 LAB — REPORT: NORMAL

## 2022-01-24 ENCOUNTER — CARE COORDINATION (OUTPATIENT)
Dept: CARE COORDINATION | Age: 77
End: 2022-01-24

## 2022-01-24 ENCOUNTER — TELEPHONE (OUTPATIENT)
Dept: INTERNAL MEDICINE CLINIC | Age: 77
End: 2022-01-24

## 2022-01-24 NOTE — TELEPHONE ENCOUNTER
----- Message from Violetta Hassan MD sent at 1/24/2022 12:26 PM EST -----  Contact: anna [phone] Daughter  The new strain is causing more fatigue than lung symptoms for any person  Encourage gatorade for hydration   Hold sugar meds until improved  If unable to eat or drink needs IV fluids at ER    Quarantine for one more week   Check oxygen saturations couple times a day , should maintain spo2 above 90 %    ----- Message -----  From: Baron Paul  Sent: 1/24/2022  10:01 AM EST  To: Violetta Hassan MD      ----- Message -----  From: Austin Hawley MA  Sent: 1/24/2022   9:43 AM EST  To: Violetta Hassan MD    Pt tested positive for covid on Friday was seen in the ED and was sent home he can not eat or drink he has lost about 4 lbs since you seen him last on 1-18-22 is sugar has been low so he stopped taking the glipiZIDE (GLUCOTROL) 5 MG tablet his o2 has been between 93-94 he is very fatigued and not getting out of bed daughter is very concerned and would like to know what they can do?

## 2022-01-24 NOTE — CARE COORDINATION
Ambulatory Care Coordination ED COVID Follow up Call    Challenges to be reviewed by the provider   Additional needs identified to be addressed with provider: Yes  labs-BS, continued symptoms, f/u appt. Encounter was routed to provider for escalation. Method of communication with provider: chart routing    Discussed COVID-19 related testing which was: available at this time. Test results were: positive. Patient informed of results, if available? Yes. Current Symptoms: fatigue, pain or aching joints, cough and decrease in PO intake, decrease in BS, weight loss    Reviewed New or Changed Meds: no medication changes    Do you have what you need at home?  Durable Medical Equipment ordered at discharge: None   Home Health/Outpatient orders at discharge: none   Was patient discharged with a pulse oximeter? Yes Discussed and confirmed pulse oximeter discharge instructions and when to notify provider or seek emergency care. Patient education provided: Reviewed appropriate site of care based on symptoms and resources available to patient including: PCP, When to call 911 and s/s of when to return to the ED, pulse ox instructions, po intake. Follow up appointment recommended: yes. If no appointment scheduled, scheduling offered: Pt daughter has a call out to pt PCP. She is currently waiting to hear back from them. Future Appointments   Date Time Provider Arleen Raza   4/11/2022  8:40 AM SCHEDULE, [facility] REMOTE TRANSMISSION [facility]   4/28/2022  2:10 PM MD [facility] None       Interventions: Obtained and reviewed discharge summary and/or continuity of care documents  Education of patient/family/caregiver/guardian to support self-management-Reviewed BS values. Pt daughter stated they are ranging in the 70-80, with some dipping into the 30-40. Reviewed discharge instructions, medical action plan and red flags with family who verbalized understanding.     Plan for follow-up call in 1-2 days based on severity of symptoms and risk factors. Plan for next call: symptom management-improvement vs. getting worse  self management-BS values  follow up appointment-scheduled? Provided contact information for future needs.     Ayse Nicolas RN

## 2022-01-24 NOTE — ED NOTES
This RN called pt with no answer. This RN told the voicemail to come into ED if not feeling any better.      Sandra Webster RN  01/23/22 1924

## 2022-01-25 LAB
BLOOD CULTURE, ROUTINE: NORMAL
CULTURE, BLOOD 2: ABNORMAL
CULTURE, BLOOD 2: ABNORMAL
ORGANISM: ABNORMAL
ORGANISM: ABNORMAL

## 2022-02-01 ENCOUNTER — CARE COORDINATION (OUTPATIENT)
Dept: CARE COORDINATION | Age: 77
End: 2022-02-01

## 2022-02-14 DIAGNOSIS — I50.42 CHRONIC COMBINED SYSTOLIC AND DIASTOLIC CONGESTIVE HEART FAILURE (HCC): Chronic | ICD-10-CM

## 2022-02-14 RX ORDER — FINASTERIDE 5 MG/1
5 TABLET, FILM COATED ORAL DAILY
Qty: 90 TABLET | Refills: 3 | Status: SHIPPED | OUTPATIENT
Start: 2022-02-14

## 2022-02-14 RX ORDER — FLASH GLUCOSE SCANNING READER
EACH MISCELLANEOUS
Qty: 1 EACH | Refills: 5 | Status: SHIPPED | OUTPATIENT
Start: 2022-02-14 | End: 2022-03-02 | Stop reason: SDUPTHER

## 2022-02-14 NOTE — TELEPHONE ENCOUNTER
----- Message from Delano Houston sent at 2/14/2022 10:37 AM EST -----  Contact: [phone] (H)  [id number], [facility] [address] [phone] [place] [facility] [phone]  ----- Message -----  From: Virgen Mascorro  Sent: 2/14/2022  10:29 AM EST  To: [address] [facility]?  ----- Message -----  From: Delano Houston  Sent: 2/14/2022   9:50 AM EST  To: Chaim Banks MD    Pt is wanting a prescription for a Free Style Leda 2. He will only need the reader, not the sensors. He tried this monitoring system and likes it better than the current one he is using.     Thank you

## 2022-02-14 NOTE — TELEPHONE ENCOUNTER
----- Message from Joann Bland sent at 2/14/2022  9:47 AM EST -----  Contact: [phone] (H)  Pt called and stated they need refill(s) on:  finasteride (PROSCAR) 5 MG tablet    They will be picking up a paper copy of the request, as they will be getting it through the [place].     Thank you

## 2022-03-02 ENCOUNTER — TELEPHONE (OUTPATIENT)
Dept: INTERNAL MEDICINE CLINIC | Age: 77
End: 2022-03-02

## 2022-03-02 RX ORDER — FLASH GLUCOSE SENSOR
KIT MISCELLANEOUS
Qty: 2 EACH | Refills: 0 | Status: SHIPPED | OUTPATIENT
Start: 2022-03-02 | End: 2022-04-28 | Stop reason: SDUPTHER

## 2022-03-02 RX ORDER — FLASH GLUCOSE SCANNING READER
EACH MISCELLANEOUS
Qty: 2 EACH | Refills: 0 | Status: SHIPPED | OUTPATIENT
Start: 2022-03-02

## 2022-03-02 NOTE — TELEPHONE ENCOUNTER
----- Message from Antonio Carrizales, [address] sent at 3/2/2022 11:21 AM EST -----  Contact: [phone] (H)  Pt needs Free style Leda 2 sensor called in please :)      [id number], [address] [place], [place] [id number] - F [phone] (Ph: [phone])

## 2022-04-11 ENCOUNTER — NURSE ONLY (OUTPATIENT)
Dept: CARDIOLOGY CLINIC | Age: 77
End: 2022-04-11
Payer: MEDICARE

## 2022-04-11 DIAGNOSIS — I42.0 DILATED CARDIOMYOPATHY (HCC): ICD-10-CM

## 2022-04-11 DIAGNOSIS — Z95.810 CARDIAC RESYNCHRONIZATION THERAPY DEFIBRILLATOR (CRT-D) IN PLACE: ICD-10-CM

## 2022-04-11 DIAGNOSIS — I50.42 CHRONIC COMBINED SYSTOLIC AND DIASTOLIC CONGESTIVE HEART FAILURE (HCC): ICD-10-CM

## 2022-04-12 NOTE — PROGRESS NOTES
Remote transmission received for patients CRT-D. Transmission shows normal sensing and pacing function. EP physician will review. See interrogation under the cardiology tab in the [address] [place] [address] [address] field for more details. Will continue to monitor remotely. Pacing (% of Time Since 10-Cole-2022)  Total * 98.4% (MVP Off)  Effective 98.2%  2 NSVT (toprol xl)    Possible OptiVol fluid accumulation: 13-Mar-2022 -- ongoing, trending down and TI trending up and above reference.

## 2022-04-14 PROCEDURE — 93297 REM INTERROG DEV EVAL ICPMS: CPT | Performed by: INTERNAL MEDICINE

## 2022-04-14 PROCEDURE — 93295 DEV INTERROG REMOTE 1/2/MLT: CPT | Performed by: INTERNAL MEDICINE

## 2022-04-14 PROCEDURE — 93296 REM INTERROG EVL PM/IDS: CPT | Performed by: INTERNAL MEDICINE

## 2022-04-28 ENCOUNTER — OFFICE VISIT (OUTPATIENT)
Dept: INTERNAL MEDICINE CLINIC | Age: 77
End: 2022-04-28

## 2022-04-28 VITALS
RESPIRATION RATE: 18 BRPM | SYSTOLIC BLOOD PRESSURE: 125 MMHG | WEIGHT: 125 LBS | DIASTOLIC BLOOD PRESSURE: 75 MMHG | HEART RATE: 70 BPM | HEIGHT: 65 IN | BODY MASS INDEX: 20.83 KG/M2

## 2022-04-28 DIAGNOSIS — G45.9 TIA (TRANSIENT ISCHEMIC ATTACK): ICD-10-CM

## 2022-04-28 DIAGNOSIS — Z95.1 S/P CABG X 3: ICD-10-CM

## 2022-04-28 DIAGNOSIS — I70.209 DIABETES MELLITUS TYPE 2 WITH ATHEROSCLEROSIS OF ARTERIES OF EXTREMITIES (HCC): ICD-10-CM

## 2022-04-28 DIAGNOSIS — I25.10 CORONARY ARTERY DISEASE INVOLVING NATIVE CORONARY ARTERY OF NATIVE HEART WITHOUT ANGINA PECTORIS: ICD-10-CM

## 2022-04-28 DIAGNOSIS — E11.51 DIABETES MELLITUS TYPE 2 WITH ATHEROSCLEROSIS OF ARTERIES OF EXTREMITIES (HCC): ICD-10-CM

## 2022-04-28 DIAGNOSIS — I10 ESSENTIAL HYPERTENSION: Primary | ICD-10-CM

## 2022-04-28 DIAGNOSIS — I42.0 DILATED CARDIOMYOPATHY (HCC): ICD-10-CM

## 2022-04-28 PROCEDURE — G8420 CALC BMI NORM PARAMETERS: HCPCS | Performed by: INTERNAL MEDICINE

## 2022-04-28 PROCEDURE — 1123F ACP DISCUSS/DSCN MKR DOCD: CPT | Performed by: INTERNAL MEDICINE

## 2022-04-28 PROCEDURE — 1036F TOBACCO NON-USER: CPT | Performed by: INTERNAL MEDICINE

## 2022-04-28 PROCEDURE — 99213 OFFICE O/P EST LOW 20 MIN: CPT | Performed by: INTERNAL MEDICINE

## 2022-04-28 PROCEDURE — 3044F HG A1C LEVEL LT 7.0%: CPT | Performed by: INTERNAL MEDICINE

## 2022-04-28 PROCEDURE — 4040F PNEUMOC VAC/ADMIN/RCVD: CPT | Performed by: INTERNAL MEDICINE

## 2022-04-28 PROCEDURE — G8427 DOCREV CUR MEDS BY ELIG CLIN: HCPCS | Performed by: INTERNAL MEDICINE

## 2022-04-28 RX ORDER — FLASH GLUCOSE SENSOR
KIT MISCELLANEOUS
Qty: 2 EACH | Refills: 0 | Status: SHIPPED | OUTPATIENT
Start: 2022-04-28

## 2022-04-28 NOTE — PROGRESS NOTES
Nisa Zuniga (:  1945) is a 67 y.o. male,Established patient, here for evaluation of the following chief complaint(s):  Follow-up           HPI       67 y.o. male with hx of  DM - 2,  Ischemic cardiomyopathy, s.p CABG, AICD , HTn, CVA , prostate enlargement here for regular f.w       Since last time, pt reports he had covid infection 2 months ago, felt bad with low appetite which did not improve     Weight loss noted, no lung symptoms. No chest pain or sob    No new dizziness   No falls    Still with occasional dysphagia but being cautious      Hx of CAD with ischemic CM, low EF, s.p CABG in 2016    had AICD placed in 2018  . EF slightly improved on follow up echo   Currently remains compliant with meds and fluid intake   Denies any active chest pain or sob  Remains active at home  No recent pedal edema        Admission to  [facility]  for right MCA stroke ( mulitple ) with left UE , left facial weakness and dysphagia with slurred speech     Workup with MRI confirmed right MCA multiple strokes. No Afibb noted in TELE. ECHO with no thrombus  Added plavix to asa , changed to dysphagia diet   No issues since then      DM- 2   Compliant with meds but does not monitor sugars  Does not follow diabetic diet  , A1c at 6.7 recently   Off metformin for swallowing issues and Now on glipizide   Has seen EYE MD  No falls  No tingling ,numbness in feet    Independent of ADL , IADL   No depression   Lives with wife    Reports he plays video games a lot     Parts of this note is copied from my previous notes and checked thoroughly and updated appropriately to reflect today's exam , findings and treatment selam      Allergies   Allergen Reactions    Iv Dye [Iodides] Anaphylaxis    Persantine [Dipyridamole]     Coreg [Carvedilol] Nausea And Vomiting     Current Outpatient Medications   Medication Sig Dispense Refill    Continuous Blood Gluc  (FREESTYLE AMRITA 2 READER) JOANNA Use to test sugars daily.  DX: E11.9 2 each 0    Continuous Blood Gluc Sensor (FREESTYLE AMRITA 2 SENSOR) [facility] Use to test sugars. DX: E11.9 2 each 0    finasteride (PROSCAR) 5 MG tablet Take 1 tablet by mouth daily 90 tablet 3    glipiZIDE (GLUCOTROL) 5 MG tablet Take 1 tablet by mouth 2 times daily (before meals) 180 tablet 0    digoxin (LANOXIN) 125 MCG tablet Take 1 tablet by mouth daily 90 tablet 3    lisinopril (PRINIVIL;ZESTRIL) 5 MG tablet Take 2 tablets by mouth daily 180 tablet 0    atorvastatin (LIPITOR) 40 MG tablet Take 1 tablet by mouth nightly 90 tablet 3    metoprolol succinate (TOPROL XL) 25 MG extended release tablet Take 1 tablet by mouth nightly 30 tablet 11    pantoprazole (PROTONIX) 40 MG tablet Take 1 tablet by mouth daily 90 tablet 1    clopidogrel (PLAVIX) 75 MG tablet Take 1 tablet by mouth daily 90 tablet 0    aspirin 81 MG tablet Take 81 mg by mouth daily        No current facility-administered medications for this visit. Review of Systems   as above    Objective   Physical Exam    There were no vitals filed for this visit. General:  Awake, alert and oriented. Appears to be not in any distress  Mucous Membranes:  Pink , anicteric  Neck: No JVD, no carotid bruit, no thyromegaly  Chest:  Clear to auscultation bilaterally, no added sounds  Cardiovascular:  RRR S1S2 heard, no murmurs or gallops  Abdomen:  Soft, undistended, non tender, no organomegaly, BS present  Extremities: No edema or cyanosis. Distal pulses well felt  Neurological : grossly normal          General:  eldelry male,  Thin built  Awake, alert and oriented. Appears to be not in any distress  Mucous Membranes:  Pink , anicteric  Neck: No JVD, no carotid bruit, no thyromegaly  Chest:  Clear to auscultation bilaterally, no added sounds, left AICD   Cardiovascular:  RRR S1S2 heard, no murmurs or gallops  Abdomen:  Soft, undistended, non tender, no organomegaly, BS present  Extremities: No edema or cyanosis.  Distal pulses well felt  Neurological : grossly normal  CN 2 to 12 intact, coordination normal  Hearing def noted  Non focal       MUGA      Abnormal resting left ventricular function with inferior, inferolateral, and    septal wall hypokinesis and EF= 20% .            MRI   Multiple small acute infarcts in right MCA territory.       Slow flow versus occlusion suggested in right middle cerebral artery M3   division.       Remote cerebral and cerebellar infarcts.          Carotid doppler          1. There is moderate plaque seen in the right internal carotid artery with    an estimated diameter reduction of <50%.    2. There is minimal plaque seen in the left internal carotid artery with an    estimated diameter reduction of <50%.    3. The vertebral arteries are patent with antegrade flow bilaterally.          Swallow eval  Aspiration observed with thin barium and nectar.       Marked anterior endplate spurring at R7-8 likely impairs swallowing.         ECHO 2018      Summary   The left ventricular systolic function is moderately reduced with an   ejection fraction of 40%.  Mild concentric left ventricular hypertrophy.   The apex and apical segments appear hypokinetic.   Left ventricular cavity size is normal.   Grade I diastolic dysfunction with normal filing pressure.   Mild mitral regurgitation.   Systolic pulmonary artery pressure (SPAP) is normal and estimated at 27 mmHg   (RA pressure 3 mmHg).   Pacer / ICD wire is visualized in the right ventricle.   Last echo on 6/28/2017 showed EF 30-35%. ECHO 3/20      Left ventricular systolic function is reduced with ejection fraction   estimated at 35 -40 %. Anteroseptal wall hypokinesis. Left ventricle size is normal.   Normal left ventricular wall thickness. Grade I diastolic dysfunction with normal filling pressure. Mild posterior mitral annular calcification is present. Mild mitral regurgitation. Aortic valve appears sclerotic but opens adequately.    Trivial aortic regurgitation is present. Right ventricular systolic function is moderately reduced . Mild tricuspid regurgitation. Normal systolic pulmonary artery pressure (SPAP) estimated at 30 mmHg (RA   pressure 3 mmHg). Lab Results   Component Value Date    LABA1C 6.7 01/08/2022     Lab Results   Component Value Date    .6 01/08/2022           CTA head and neck    There is 60-70% stenosis in the origin of right ICA.       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       No evidence of intracranial large vessel occlusion.         Ct head    Chronic ischemic changes as above with no acute intracranial abnormality.       Generalized atrophy.         cxr    No acute process. Assessment:       Diagnosis Orders   1. Essential hypertension     2. Dilated cardiomyopathy ([address]     3. 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 TIA (transient ischemic attack)     5. CAD s/p CABGx3 (2016)     6. Coronary artery disease involving native coronary artery of native heart without angina pectoris               Plan:         DM- 2 - stopped  Metformin as he did not tolerate with abd cramps  Now on glipizide 5 mg daily- not sure if compliant    Advice low carb diet. stop soft drinks Continue same meds  monitor once daily   -last  A1 c at 6.7 - repeat labs  already on CEI and statins  Had eye exam    CAD - multivessel - s/p  CABG in 9/16      Continue Toprol xl  -25 mg in am  conitnue   ACEI and digoxin  Continue  statins      Cardiomyopathy - ischemic   well compensated. continue BB, ACEI   No reason for lasix. . S/p AICD  Improved to 40 % on recent ECHO    Hx of CVA - right MCA territory   - on ASA,statins      Dysphagia -s.p EGD with dilatation.  No more swallow issues  On ppi       Hyperlipidemia - reports being compliant with statins    Prostate issues- s /p Cysto with no issues- on proscar     Need fit test-refuses   Had vaccines at [facility] [address]  covid vaccine and flu shot   Need living will - reports wife or daughter will be the POA         An electronic signature was used to authenticate this note.     --Darian Beck MD

## 2022-04-29 LAB
A/G RATIO: 1.5 (ref 1.1–2.2)
ALBUMIN SERPL-MCNC: 4.6 G/DL (ref 3.4–5)
ALP BLD-CCNC: 84 U/L (ref 40–129)
ALT SERPL-CCNC: 10 U/L (ref 10–40)
ANION GAP SERPL CALCULATED.3IONS-SCNC: 18 MMOL/L (ref 3–16)
AST SERPL-CCNC: 19 U/L (ref 15–37)
BILIRUB SERPL-MCNC: 0.7 MG/DL (ref 0–1)
BUN BLDV-MCNC: 7 MG/DL (ref 7–20)
CALCIUM SERPL-MCNC: 9.9 MG/DL (ref 8.3–10.6)
CHLORIDE BLD-SCNC: 99 MMOL/L (ref 99–110)
CO2: 23 MMOL/L (ref 21–32)
CREAT SERPL-MCNC: 1.1 MG/DL (ref 0.8–1.3)
ESTIMATED AVERAGE GLUCOSE: 137 MG/DL
GFR AFRICAN AMERICAN: >60
GFR NON-AFRICAN AMERICAN: >60
GLUCOSE BLD-MCNC: 253 MG/DL (ref 70–99)
HBA1C MFR BLD: 6.4 %
POTASSIUM SERPL-SCNC: 4.4 MMOL/L (ref 3.5–5.1)
SODIUM BLD-SCNC: 140 MMOL/L (ref 136–145)
TOTAL PROTEIN: 7.7 G/DL (ref 6.4–8.2)

## 2022-06-14 ENCOUNTER — OFFICE VISIT (OUTPATIENT)
Dept: INTERNAL MEDICINE CLINIC | Age: 77
End: 2022-06-14

## 2022-06-14 VITALS
SYSTOLIC BLOOD PRESSURE: 110 MMHG | DIASTOLIC BLOOD PRESSURE: 75 MMHG | WEIGHT: 124 LBS | BODY MASS INDEX: 20.66 KG/M2 | HEIGHT: 65 IN | RESPIRATION RATE: 18 BRPM | HEART RATE: 70 BPM

## 2022-06-14 DIAGNOSIS — S40.861A TICK BITE OF RIGHT UPPER ARM, INITIAL ENCOUNTER: Primary | ICD-10-CM
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
## 2022-06-14 PROCEDURE — 1036F TOBACCO NON-USER: CPT | Performed by: INTERNAL MEDICINE

## 2022-06-14 PROCEDURE — G8420 CALC BMI NORM PARAMETERS: HCPCS | Performed by: INTERNAL MEDICINE

## 2022-06-14 PROCEDURE — G8427 DOCREV CUR MEDS BY ELIG CLIN: HCPCS | Performed by: INTERNAL MEDICINE

## 2022-06-14 PROCEDURE — 99212 OFFICE O/P EST SF 10 MIN: CPT | Performed by: INTERNAL MEDICINE

## 2022-06-14 PROCEDURE — 1123F ACP DISCUSS/DSCN MKR DOCD: CPT | Performed by: INTERNAL MEDICINE

## 2022-06-14 RX ORDER — DOXYCYCLINE HYCLATE 100 MG
100 TABLET ORAL 2 TIMES DAILY
Qty: 10 TABLET | Refills: 0 | Status: SHIPPED | OUTPATIENT
Start: 2022-06-14 | End: 2022-06-19

## 2022-06-14 NOTE — PROGRESS NOTES
Liset Chew (:  1945) is a 67 y.o. male,Established patient, here for evaluation of the following chief complaint(s):  Tick Removal           HPI      67 y.o. male with hx of  DM - 2,  Ischemic cardiomyopathy, s.p CABG, AICD , HTn, CVA , prostate enlargement here for tick bite and redness to right upper arm     Reports he saw a tick attached to right arm lateral aspect ealier this week and removed it. Skin around it became red and is worried and wishes to get checked  No fevers. No other symptoms      Allergies   Allergen Reactions    Iv Dye [Iodides] Anaphylaxis    Persantine [Dipyridamole]     Coreg [Carvedilol] Nausea And Vomiting     Current Outpatient Medications   Medication Sig Dispense Refill    doxycycline hyclate (VIBRA-TABS) 100 MG tablet Take 1 tablet by mouth 2 times daily for 5 days 10 tablet 0    Continuous Blood Gluc Sensor (FREESTYLE AMRITA 2 SENSOR) MISC Use to test sugars. DX: E11.9 2 each 0    Continuous Blood Gluc  (FREESTYLE AMRITA 2 READER) JOANNA Use to test sugars daily. DX: E11.9 2 each 0    finasteride (PROSCAR) 5 MG tablet Take 1 tablet by mouth daily 90 tablet 3    glipiZIDE (GLUCOTROL) 5 MG tablet Take 1 tablet by mouth 2 times daily (before meals) 180 tablet 0    digoxin (LANOXIN) 125 MCG tablet Take 1 tablet by mouth daily 90 tablet 3    lisinopril (PRINIVIL;ZESTRIL) 5 MG tablet Take 2 tablets by mouth daily 180 tablet 0    atorvastatin (LIPITOR) 40 MG tablet Take 1 tablet by mouth nightly 90 tablet 3    metoprolol succinate (TOPROL XL) 25 MG extended release tablet Take 1 tablet by mouth nightly 30 tablet 11    pantoprazole (PROTONIX) 40 MG tablet Take 1 tablet by mouth daily 90 tablet 1    clopidogrel (PLAVIX) 75 MG tablet Take 1 tablet by mouth daily 90 tablet 0    aspirin 81 MG tablet Take 81 mg by mouth daily        No current facility-administered medications for this visit.        Review of Systems     As above  Objective   Physical Exam Vitals:    06/14/22 1055   BP: 110/75   Pulse: 70   Resp: 18       General:  eldelry male,  Thin built  Awake, alert and oriented. Appears to be not in any distress  Mucous Membranes:  Pink , anicteric  Neck: No JVD, no carotid bruit, no thyromegaly  Chest:  Clear to auscultation bilaterally, no added sounds, left AICD   Cardiovascular:  RRR S1S2 heard, no murmurs or gallops  Abdomen:  Soft, undistended, non tender, no organomegaly, BS present  Extremities: No edema or cyanosis. Distal pulses well felt  Right arm with halo sign of redness and central clear spot   Neurological : grossly normal        Vitals:    06/14/22 1055   BP: 110/75   Pulse: 70   Resp: 18     Wt Readings from Last 3 Encounters:   06/14/22 124 lb (56.2 kg)   04/28/22 125 lb (56.7 kg)   01/21/22 130 lb (59 kg)        Diagnosis Orders   1. Tick bite of right upper arm, initial encounter       Start on doxycycline 100 mg bid  Hold off on obtaining titres as pt with no symptoms    Reassured family in room    An electronic signature was used to authenticate this note.     --Darian Beck MD

## 2022-07-11 ENCOUNTER — NURSE ONLY (OUTPATIENT)
Dept: CARDIOLOGY CLINIC | Age: 77
End: 2022-07-11

## 2022-07-11 DIAGNOSIS — I42.0 DILATED CARDIOMYOPATHY (HCC): ICD-10-CM

## 2022-07-11 DIAGNOSIS — Z95.810 CARDIAC RESYNCHRONIZATION THERAPY DEFIBRILLATOR (CRT-D) IN PLACE: ICD-10-CM

## 2022-07-11 DIAGNOSIS — I50.42 CHRONIC COMBINED SYSTOLIC AND DIASTOLIC CONGESTIVE HEART FAILURE (HCC): Primary | ICD-10-CM

## 2022-07-13 NOTE — PROGRESS NOTES
End of 91-day monitoring period 7/11/22. Remote transmission received for patients CRT-D. Transmission shows normal sensing and pacing function. EP physician will review. See interrogation under the cardiology tab in the [address] [place] [address] [address] field for more details. Will continue to monitor remotely. Hx NSVT (toprol xl)     No  arrhythmias recorded. Pacing (% of Time Since 24-May-2022)  Total * 98.3% (MVP Off)  Effective 98.0%    Thoracic impedance trend stable.

## 2022-10-10 ENCOUNTER — NURSE ONLY (OUTPATIENT)
Dept: CARDIOLOGY CLINIC | Age: 77
End: 2022-10-10
Payer: MEDICARE

## 2022-10-10 DIAGNOSIS — I50.42 CHRONIC COMBINED SYSTOLIC AND DIASTOLIC CONGESTIVE HEART FAILURE (HCC): ICD-10-CM

## 2022-10-10 DIAGNOSIS — Z95.810 CARDIAC RESYNCHRONIZATION THERAPY DEFIBRILLATOR (CRT-D) IN PLACE: Primary | ICD-10-CM

## 2022-10-10 DIAGNOSIS — I42.0 DILATED CARDIOMYOPATHY (HCC): ICD-10-CM

## 2022-10-10 PROCEDURE — 93296 REM INTERROG EVL PM/IDS: CPT | Performed by: INTERNAL MEDICINE

## 2022-10-10 PROCEDURE — 93297 REM INTERROG DEV EVAL ICPMS: CPT | Performed by: INTERNAL MEDICINE

## 2022-10-10 PROCEDURE — 93295 DEV INTERROG REMOTE 1/2/MLT: CPT | Performed by: INTERNAL MEDICINE

## 2022-10-13 NOTE — PROGRESS NOTES
We received remote transmission from patient's monitor at home. Transmission shows normal sensing and pacing function. EP physician will review. See interrogation under cardiology tab in the [address] [place] [address] [address] field for more details. Optivol is within normal range. Total * 98.3% (MVP Off)  Effective 98.2%  AS-VS 1.4%  AS- 96.1%  AP-VS < 0.1%  AP- 2.4%    End of 91-day monitoring period 10-10-22.

## 2022-11-09 ENCOUNTER — OFFICE VISIT (OUTPATIENT)
Dept: INTERNAL MEDICINE CLINIC | Age: 77
End: 2022-11-09

## 2022-11-09 VITALS
SYSTOLIC BLOOD PRESSURE: 110 MMHG | WEIGHT: 122 LBS | HEIGHT: 65 IN | DIASTOLIC BLOOD PRESSURE: 70 MMHG | RESPIRATION RATE: 18 BRPM | HEART RATE: 60 BPM | BODY MASS INDEX: 20.33 KG/M2

## 2022-11-09 DIAGNOSIS — I25.10 CORONARY ARTERY DISEASE INVOLVING NATIVE CORONARY ARTERY OF NATIVE HEART WITHOUT ANGINA PECTORIS: ICD-10-CM

## 2022-11-09 DIAGNOSIS — E11.51 DIABETES MELLITUS TYPE 2 WITH ATHEROSCLEROSIS OF ARTERIES OF EXTREMITIES (HCC): ICD-10-CM

## 2022-11-09 DIAGNOSIS — I10 ESSENTIAL HYPERTENSION: ICD-10-CM

## 2022-11-09 DIAGNOSIS — Z95.1 S/P CABG X 3: ICD-10-CM

## 2022-11-09 DIAGNOSIS — I50.42 CHRONIC COMBINED SYSTOLIC AND DIASTOLIC CONGESTIVE HEART FAILURE (HCC): ICD-10-CM

## 2022-11-09 DIAGNOSIS — I70.209 DIABETES MELLITUS TYPE 2 WITH ATHEROSCLEROSIS OF ARTERIES OF EXTREMITIES (HCC): ICD-10-CM

## 2022-11-09 DIAGNOSIS — Z00.00 MEDICARE ANNUAL WELLNESS VISIT, SUBSEQUENT: Primary | ICD-10-CM

## 2022-11-09 DIAGNOSIS — I42.0 DILATED CARDIOMYOPATHY (HCC): ICD-10-CM

## 2022-11-09 LAB
A/G RATIO: 1.7 (ref 1.1–2.2)
ALBUMIN SERPL-MCNC: 4.8 G/DL (ref 3.4–5)
ALP BLD-CCNC: 77 U/L (ref 40–129)
ALT SERPL-CCNC: 9 U/L (ref 10–40)
ANION GAP SERPL CALCULATED.3IONS-SCNC: 13 MMOL/L (ref 3–16)
AST SERPL-CCNC: 13 U/L (ref 15–37)
BASOPHILS ABSOLUTE: 0 K/UL (ref 0–0.2)
BASOPHILS RELATIVE PERCENT: 0.6 %
BILIRUB SERPL-MCNC: 0.4 MG/DL (ref 0–1)
BUN BLDV-MCNC: 17 MG/DL (ref 7–20)
CALCIUM SERPL-MCNC: 10.3 MG/DL (ref 8.3–10.6)
CHLORIDE BLD-SCNC: 99 MMOL/L (ref 99–110)
CHOLESTEROL, FASTING: 145 MG/DL (ref 0–199)
CO2: 25 MMOL/L (ref 21–32)
CREAT SERPL-MCNC: 1.1 MG/DL (ref 0.8–1.3)
EOSINOPHILS ABSOLUTE: 0.2 K/UL (ref 0–0.6)
EOSINOPHILS RELATIVE PERCENT: 2.9 %
GFR SERPL CREATININE-BSD FRML MDRD: >60 ML/MIN/{1.73_M2}
GLUCOSE BLD-MCNC: 156 MG/DL (ref 70–99)
HCT VFR BLD CALC: 44.2 % (ref 40.5–52.5)
HDLC SERPL-MCNC: 33 MG/DL (ref 40–60)
HEMOGLOBIN: 14.4 G/DL (ref 13.5–17.5)
LDL CHOLESTEROL CALCULATED: 88 MG/DL
LYMPHOCYTES ABSOLUTE: 1.7 K/UL (ref 1–5.1)
LYMPHOCYTES RELATIVE PERCENT: 21.7 %
MCH RBC QN AUTO: 28.2 PG (ref 26–34)
MCHC RBC AUTO-ENTMCNC: 32.6 G/DL (ref 31–36)
MCV RBC AUTO: 86.6 FL (ref 80–100)
MONOCYTES ABSOLUTE: 0.6 K/UL (ref 0–1.3)
MONOCYTES RELATIVE PERCENT: 7.5 %
NEUTROPHILS ABSOLUTE: 5.4 K/UL (ref 1.7–7.7)
NEUTROPHILS RELATIVE PERCENT: 67.3 %
PDW BLD-RTO: 13.8 % (ref 12.4–15.4)
PLATELET # BLD: 244 K/UL (ref 135–450)
PMV BLD AUTO: 8.5 FL (ref 5–10.5)
POTASSIUM SERPL-SCNC: 5 MMOL/L (ref 3.5–5.1)
RBC # BLD: 5.11 M/UL (ref 4.2–5.9)
SODIUM BLD-SCNC: 137 MMOL/L (ref 136–145)
TOTAL PROTEIN: 7.7 G/DL (ref 6.4–8.2)
TRIGLYCERIDE, FASTING: 120 MG/DL (ref 0–150)
TSH REFLEX FT4: 2.68 UIU/ML (ref 0.27–4.2)
URIC ACID, SERUM: 6.7 MG/DL (ref 3.5–7.2)
VLDLC SERPL CALC-MCNC: 24 MG/DL
WBC # BLD: 8 K/UL (ref 4–11)

## 2022-11-09 PROCEDURE — G8484 FLU IMMUNIZE NO ADMIN: HCPCS | Performed by: INTERNAL MEDICINE

## 2022-11-09 PROCEDURE — 3078F DIAST BP <80 MM HG: CPT | Performed by: INTERNAL MEDICINE

## 2022-11-09 PROCEDURE — G0439 PPPS, SUBSEQ VISIT: HCPCS | Performed by: INTERNAL MEDICINE

## 2022-11-09 PROCEDURE — 1123F ACP DISCUSS/DSCN MKR DOCD: CPT | Performed by: INTERNAL MEDICINE

## 2022-11-09 PROCEDURE — 3044F HG A1C LEVEL LT 7.0%: CPT | Performed by: INTERNAL MEDICINE

## 2022-11-09 PROCEDURE — 3074F SYST BP LT 130 MM HG: CPT | Performed by: INTERNAL MEDICINE

## 2022-11-09 ASSESSMENT — PATIENT HEALTH QUESTIONNAIRE - PHQ9
SUM OF ALL RESPONSES TO PHQ QUESTIONS 1-9: 0
SUM OF ALL RESPONSES TO PHQ QUESTIONS 1-9: 0
1. LITTLE INTEREST OR PLEASURE IN DOING THINGS: 0
SUM OF ALL RESPONSES TO PHQ QUESTIONS 1-9: 0
SUM OF ALL RESPONSES TO PHQ QUESTIONS 1-9: 0
2. FEELING DOWN, DEPRESSED OR HOPELESS: 0
SUM OF ALL RESPONSES TO PHQ QUESTIONS 1-9: 0
2. FEELING DOWN, DEPRESSED OR HOPELESS: 0
SUM OF ALL RESPONSES TO PHQ QUESTIONS 1-9: 0
1. LITTLE INTEREST OR PLEASURE IN DOING THINGS: 0
SUM OF ALL RESPONSES TO PHQ9 QUESTIONS 1 & 2: 0
SUM OF ALL RESPONSES TO PHQ9 QUESTIONS 1 & 2: 0

## 2022-11-09 ASSESSMENT — LIFESTYLE VARIABLES
HOW MANY STANDARD DRINKS CONTAINING ALCOHOL DO YOU HAVE ON A TYPICAL DAY: PATIENT DOES NOT DRINK
HOW OFTEN DO YOU HAVE A DRINK CONTAINING ALCOHOL: NEVER

## 2022-11-09 NOTE — PATIENT INSTRUCTIONS
Personalized Preventive Plan for Marcelo Busby - 11/9/2022  Medicare offers a range of preventive health benefits. Some of the tests and screenings are paid in full while other may be subject to a deductible, co-insurance, and/or copay. Some of these benefits include a comprehensive review of your medical history including lifestyle, illnesses that may run in your family, and various assessments and screenings as appropriate. After reviewing your medical record and screening and assessments performed today your provider may have ordered immunizations, labs, imaging, and/or referrals for you. A list of these orders (if applicable) as well as your Preventive Care list are included within your After Visit Summary for your review. Other Preventive Recommendations:    A preventive eye exam performed by an eye specialist is recommended every 1-2 years to screen for glaucoma; cataracts, macular degeneration, and other eye disorders. A preventive dental visit is recommended every 6 months. Try to get at least 150 minutes of exercise per week or 10,000 steps per day on a pedometer . Order or download the FREE \"Exercise & Physical Activity: Your Everyday Guide\" from [employer]. Call 3-956.780.9944 or search [employer] online. You need 3573-9901 mg of calcium and 6988-0094 IU of vitamin D per day. It is possible to meet your calcium requirement with diet alone, but a vitamin D supplement is usually necessary to meet this goal.  When exposed to the sun, use a sunscreen that protects against both UVA and UVB radiation with an SPF of 30 or greater. Reapply every 2 to 3 hours or after sweating, drying off with a towel, or swimming. Always wear a seat belt when traveling in a car. Always wear a helmet when riding a bicycle or motorcycle.

## 2022-11-09 NOTE — PROGRESS NOTES
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 ([facility] [address]  -     CBC with Auto Differential; Future  -     Comprehensive Metabolic Panel; Future  -     Lipid, Fasting; Future  -     Hemoglobin A1C; Future  -     Uric Acid; Future  -     MICROALBUMIN / CREATININE URINE RATIO; Future  Dilated cardiomyopathy (HCC)  Essential hypertension  -     TSH with Reflex to FT4; Future  CAD s/p CABGx3 (2016)  Coronary artery disease involving native coronary artery of native heart without angina pectoris  Chronic combined systolic (EF 17%) & diastolic (grade 1 LVDD) CHF    Recommendations for Preventive Services Due: see orders and patient instructions/AVS.  Recommended screening schedule for the next 5-10 years is provided to the patient in written form: see Patient Instructions/AVS.     Return in about 4 months (around 3/9/2023) for htn dm. Subjective       67 y.o. male with hx of  DM - 2,  Ischemic cardiomyopathy, s.p CABG, AICD , HTn, CVA , prostate enlargement here for medicare wellness visit      Since last time, pt reports he has been doing ok with no new issues of chest pain or sob  Ongoing Weight loss noted, no lung symptoms. poor appetite per wife with dysphagia issues  Has hearing loss  but no memory issues    No new dizziness   No falls    Still with occasional dysphagia but being cautious      Hx of CAD with ischemic CM, low EF, s.p CABG in 2016    had AICD placed in 2018  . EF slightly improved on follow up echo   Currently remains compliant with meds and fluid intake   Denies any active chest pain or sob  Remains active at home  No recent pedal edema        Admission to  [facility] 6/2017 for right MCA stroke ( mulitple ) with left UE , left facial weakness and dysphagia with slurred speech     Workup with MRI confirmed right MCA multiple strokes.  No Afibb noted in [facility]. ECHO with no thrombus  Added plavix to asa , changed to dysphagia diet   No issues since then      DM- 2   Compliant with meds but does not monitor sugars  Does not follow diabetic diet  , A1c at 6.7 recently   Off metformin for swallowing issues and Now on glipizide   Has seen EYE MD  No falls  No tingling ,numbness in feet    Independent of ADL , IADL   No depression   Lives with wife    Reports he plays video games a lot at night and sleeps in daytime     Parts of this note is copied from my previous notes and checked thoroughly and updated appropriately to reflect today's exam , findings and treatment selam              Patient's complete Health Risk Assessment and screening values have been reviewed and are found in Flowsheets. The following problems were reviewed today and where indicated follow up appointments were made and/or referrals ordered.     Positive Risk Factor Screenings with Interventions:             General Health and ACP:  General  In general, how would you say your health is?: Fair  In the past 7 days, have you experienced any of the following: New or Increased Pain, New or Increased Fatigue, Loneliness, Social Isolation, Stress or Anger?: No  Do you get the social and emotional support that you need?: Yes  Do you have a Living Will?: (!) No    Advance Directives       Power of  Living Will ACP-Advance Directive ACP-Power of     Not on File Not on File Not on File Not on File        General Health Risk Interventions:  No Living Will: Advance Care Planning addressed with patient today    Health Habits/Nutrition:  Physical Activity: Inactive    Days of Exercise per Week: 0 days    Minutes of Exercise per Session: 0 min     Have you lost any weight without trying in the past 3 months?: (!) Yes  Body mass index: 20.3  Have you seen the dentist within the past year?: (!) No  Health Habits/Nutrition Interventions:  Inadequate physical activity:  patient is not ready to increase his/her physical activity level at this time    Hearing/Vision:  Do you or your family notice any trouble with your hearing that hasn't been managed with hearing aids?: (!) Yes  Do you have difficulty driving, watching TV, or doing any of your daily activities because of your eyesight?: No  Have you had an eye exam within the past year?: (!) No  No results found. Hearing/Vision Interventions:  Hearing concerns:  patient declines any further evaluation/treatment for hearing issues    Safety:  Do you have working smoke detectors?: Yes  Do you have any tripping hazards - loose or unsecured carpets or rugs?: No  Do you have any tripping hazards - clutter in doorways, halls, or stairs?: No  Do you have either shower bars, grab bars, non-slip mats or non-slip surfaces in your shower or bathtub?: (!) No  Do all of your stairways have a railing or banister?: Yes  Do you always fasten your seatbelt when you are in a car?: Yes  Safety Interventions:  Home safety tips provided           Objective   Vitals:    11/09/22 0909   BP: 110/70   Pulse: 60   Resp: 18   Weight: 122 lb (55.3 kg)   Height: 5' 5\" (1.651 m)      Body mass index is 20.3 kg/m². Allergies   Allergen Reactions    Iv Dye [Iodides] Anaphylaxis    Persantine [Dipyridamole]     Coreg [Carvedilol] Nausea And Vomiting     Prior to Visit Medications    Medication Sig Taking? Authorizing Provider   Continuous Blood Gluc Sensor (FREESTYLE AMRITA 2 SENSOR) [facility] Use to test sugars. DX: E11.9  Ajith Simmons MD   Continuous Blood Gluc  (FREESTYLE AMRITA 2 READER) JOANNA Use to test sugars daily.  DX: E11.9  Ajith Simmons MD   finasteride (PROSCAR) 5 MG tablet Take 1 tablet by mouth daily  Ajith Simmons MD   glipiZIDE (GLUCOTROL) 5 MG tablet Take 1 tablet by mouth 2 times daily (before meals)  Ajith Simmons MD   digoxin (LANOXIN) 125 MCG tablet Take 1 tablet by mouth daily  Aria Medeiros, APRN - DARNE[place]   lisinopril (PRINIVIL;ZESTRIL) 5 MG tablet Take 2 tablets by mouth daily  Jasbir Quinteros MD   atorvastatin (LIPITOR) 40 MG tablet Take 1 tablet by mouth nightly  Jasbir Quinteros MD   metoprolol succinate (TOPROL XL) 25 MG extended release tablet Take 1 tablet by mouth nightly  Jasbir Quinteros MD   pantoprazole (PROTONIX) 40 MG tablet Take 1 tablet by mouth daily  Jasbir Quinteros MD   aspirin 81 MG tablet Take 81 mg by mouth daily   Historical Provider, MD         General:  Ирина Cat male,  Thin built  Awake, alert and oriented. Appears to be not in any distress  Mucous Membranes:  Pink , anicteric  Neck: No JVD, no carotid bruit, no thyromegaly  Chest:  Clear to auscultation bilaterally, no added sounds, left AICD   Cardiovascular:  RRR S1S2 heard, no murmurs or gallops  Abdomen:  Soft, undistended, non tender, no organomegaly, BS present  Extremities: No edema or cyanosis. Distal pulses well felt  Neurological : grossly normal  CN 2 to 12 intact, coordination normal  Hearing def noted  Non focal       MUGA      Abnormal resting left ventricular function with inferior, inferolateral, and    septal wall hypokinesis and EF= 20% . MRI   Multiple small acute infarcts in right MCA territory. Slow flow versus occlusion suggested in right middle cerebral artery M3   division. Remote cerebral and cerebellar infarcts. Carotid doppler          1. There is moderate plaque seen in the right internal carotid artery with    an estimated diameter reduction of <50%. 2. There is minimal plaque seen in the left internal carotid artery with an    estimated diameter reduction of <50%. 3. The vertebral arteries are patent with antegrade flow bilaterally. Swallow eval  Aspiration observed with thin barium and nectar. Marked anterior endplate spurring at P2-0 likely impairs swallowing.          ECHO 2018      Summary   The left ventricular systolic function is moderately reduced with an   ejection fraction of 40%. Mild concentric left ventricular hypertrophy. The apex and apical segments appear hypokinetic. Left ventricular cavity size is normal.   Grade I diastolic dysfunction with normal filing pressure. Mild mitral regurgitation. Systolic pulmonary artery pressure (SPAP) is normal and estimated at 27 mmHg   (RA pressure 3 mmHg). Pacer / ICD wire is visualized in the right ventricle. Last echo on 6/28/2017 showed EF 30-35%. ECHO 3/20      Left ventricular systolic function is reduced with ejection fraction   estimated at 35 -40 %. Anteroseptal wall hypokinesis. Left ventricle size is normal.   Normal left ventricular wall thickness. Grade I diastolic dysfunction with normal filling pressure. Mild posterior mitral annular calcification is present. Mild mitral regurgitation. Aortic valve appears sclerotic but opens adequately. Trivial aortic regurgitation is present. Right ventricular systolic function is moderately reduced . Mild tricuspid regurgitation. Normal systolic pulmonary artery pressure (SPAP) estimated at 30 mmHg (RA   pressure 3 mmHg). Wt Readings from Last 3 Encounters:   11/09/22 122 lb (55.3 kg)   06/14/22 124 lb (56.2 kg)   04/28/22 125 lb (56.7 kg)       Hemoglobin A1C   Date Value Ref Range Status   04/28/2022 6.4 See comment % Final     Comment:     Comment:  Diagnosis of Diabetes: > or = 6.5%  Increased risk of diabetes (Prediabetes): 5.7-6.4%  Glycemic Control: Nonpregnant Adults: <7.0%                    Pregnant: <6.0%             CTA head and neck    There is 60-70% stenosis in the origin of right ICA. 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 No evidence of intracranial large vessel occlusion. Ct head    Chronic ischemic changes as above with no acute intracranial abnormality. Generalized atrophy. Assessment:       Diagnosis Orders   1.  Medicare annual wellness visit, subsequent        2. 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 Dilated cardiomyopathy ([address]        4. Essential hypertension  TSH with Reflex to FT4      5. CAD s/p CABGx3 (2016)        6. Coronary artery disease involving native coronary artery of native heart without angina pectoris        7. Chronic combined systolic (EF 23%) & diastolic (grade 1 LVDD) CHF                Plan:         DM- 2 - stopped  Metformin as he did not tolerate with abd cramps  Now on glipizide 5 mg bid - not sure if compliant    Advice low carb diet. Pt to stop coke products  Continue same meds  monitor once daily   -last  A1 c at 6.4 - repeat labs  already on CEI and statins  Need  eye exam    CAD - multivessel - s/p  CABG in 9/16      Continue Toprol xl  -25 mg in am  conitnue   ACEI , ASA   Continue  statins      Cardiomyopathy - Ischemic   well compensated. continue BB, ACEI   No reason for lasix. . S/p AICD  Improved to 40 % on recent ECHO 2020  No features of CHF   May need repeat echo     Hx of CVA - right MCA territory   - on ASA,statins      Dysphagia -s.p EGD with dilatation.  Ongoing swallowing problems  On ppi   Might need again       Hyperlipidemia - reports being compliant with statins    Prostate issues- s /p Cysto with no issues- on proscar     Need fit test-refuses   Had vaccines at [address] [place] [address] covid vaccine and flu shot   Need living will - reports wife or daughter will be the POA         CareTeam (Including outside providers/suppliers regularly involved in providing care):   Patient Care Team:  Diana Ponce MD as PCP - General  Diana Ponce MD as PCP - [facility] Isabela Staton Provider     Reviewed and updated this visit:  Tobacco  Allergies  Meds  Problems  Med Hx  Surg Hx  Soc Hx  Fam Hx

## 2022-11-10 LAB
ESTIMATED AVERAGE GLUCOSE: 142.7 MG/DL
HBA1C MFR BLD: 6.6 %

## 2023-01-09 ENCOUNTER — NURSE ONLY (OUTPATIENT)
Dept: CARDIOLOGY CLINIC | Age: 78
End: 2023-01-09

## 2023-01-09 DIAGNOSIS — Z95.810 CARDIAC RESYNCHRONIZATION THERAPY DEFIBRILLATOR (CRT-D) IN PLACE: Primary | ICD-10-CM

## 2023-01-09 DIAGNOSIS — I50.42 CHRONIC COMBINED SYSTOLIC AND DIASTOLIC CONGESTIVE HEART FAILURE (HCC): ICD-10-CM

## 2023-01-09 DIAGNOSIS — I42.0 DILATED CARDIOMYOPATHY (HCC): ICD-10-CM

## 2023-01-20 NOTE — PROGRESS NOTES
End of 91-day monitoring period 1/9/23. Pacing (% of Time Since 07-Dec-2022)  Total * 98.0% (MVP Off)  Effective 97.9%  Thoracic impedance trend stable. 1 NSVT. Remote transmission received for patients CRT-D. Transmission shows normal sensing and pacing function. EP physician will review. See interrogation under the cardiology tab in the [address] [address] [address] field for more details. Will continue to monitor remotely.    Hx NSVT (toprol xl)

## 2023-05-09 ENCOUNTER — OFFICE VISIT (OUTPATIENT)
Dept: INTERNAL MEDICINE CLINIC | Age: 78
End: 2023-05-09

## 2023-05-09 VITALS
SYSTOLIC BLOOD PRESSURE: 135 MMHG | HEART RATE: 65 BPM | HEIGHT: 65 IN | RESPIRATION RATE: 18 BRPM | BODY MASS INDEX: 21.66 KG/M2 | DIASTOLIC BLOOD PRESSURE: 75 MMHG | WEIGHT: 130 LBS

## 2023-05-09 DIAGNOSIS — E11.51 DIABETES MELLITUS TYPE 2 WITH ATHEROSCLEROSIS OF ARTERIES OF EXTREMITIES (HCC): ICD-10-CM

## 2023-05-09 DIAGNOSIS — Z95.1 S/P CABG X 3: ICD-10-CM

## 2023-05-09 DIAGNOSIS — I42.0 DILATED CARDIOMYOPATHY (HCC): ICD-10-CM

## 2023-05-09 DIAGNOSIS — I50.42 CHRONIC COMBINED SYSTOLIC AND DIASTOLIC CONGESTIVE HEART FAILURE (HCC): ICD-10-CM

## 2023-05-09 DIAGNOSIS — I25.10 CORONARY ARTERY DISEASE INVOLVING NATIVE CORONARY ARTERY OF NATIVE HEART WITHOUT ANGINA PECTORIS: ICD-10-CM

## 2023-05-09 DIAGNOSIS — I70.209 DIABETES MELLITUS TYPE 2 WITH ATHEROSCLEROSIS OF ARTERIES OF EXTREMITIES (HCC): Primary | ICD-10-CM

## 2023-05-09 DIAGNOSIS — E11.51 DIABETES MELLITUS TYPE 2 WITH ATHEROSCLEROSIS OF ARTERIES OF EXTREMITIES (HCC): Primary | ICD-10-CM

## 2023-05-09 DIAGNOSIS — I10 ESSENTIAL HYPERTENSION: ICD-10-CM

## 2023-05-09 DIAGNOSIS — N42.9 PROSTATE DISORDER: ICD-10-CM

## 2023-05-09 DIAGNOSIS — I70.209 DIABETES MELLITUS TYPE 2 WITH ATHEROSCLEROSIS OF ARTERIES OF EXTREMITIES (HCC): ICD-10-CM

## 2023-05-09 PROCEDURE — 99213 OFFICE O/P EST LOW 20 MIN: CPT | Performed by: INTERNAL MEDICINE

## 2023-05-09 PROCEDURE — G8427 DOCREV CUR MEDS BY ELIG CLIN: HCPCS | Performed by: INTERNAL MEDICINE

## 2023-05-09 PROCEDURE — 1123F ACP DISCUSS/DSCN MKR DOCD: CPT | Performed by: INTERNAL MEDICINE

## 2023-05-09 PROCEDURE — G8420 CALC BMI NORM PARAMETERS: HCPCS | Performed by: INTERNAL MEDICINE

## 2023-05-09 PROCEDURE — 1036F TOBACCO NON-USER: CPT | Performed by: INTERNAL MEDICINE

## 2023-05-09 PROCEDURE — 3078F DIAST BP <80 MM HG: CPT | Performed by: INTERNAL MEDICINE

## 2023-05-09 PROCEDURE — 3075F SYST BP GE 130 - 139MM HG: CPT | Performed by: INTERNAL MEDICINE

## 2023-05-09 RX ORDER — LISINOPRIL 5 MG/1
10 TABLET ORAL DAILY
Qty: 180 TABLET | Refills: 0 | Status: SHIPPED | OUTPATIENT
Start: 2023-05-09

## 2023-05-09 RX ORDER — GLIPIZIDE 5 MG/1
5 TABLET ORAL
Qty: 180 TABLET | Refills: 0 | Status: SHIPPED | OUTPATIENT
Start: 2023-05-09

## 2023-05-09 RX ORDER — ATORVASTATIN CALCIUM 40 MG/1
40 TABLET, FILM COATED ORAL NIGHTLY
Qty: 90 TABLET | Refills: 3 | Status: SHIPPED | OUTPATIENT
Start: 2023-05-09

## 2023-05-09 RX ORDER — METOPROLOL SUCCINATE 25 MG/1
25 TABLET, EXTENDED RELEASE ORAL NIGHTLY
Qty: 30 TABLET | Refills: 11 | Status: SHIPPED | OUTPATIENT
Start: 2023-05-09

## 2023-05-09 RX ORDER — DIGOXIN 125 MCG
125 TABLET ORAL DAILY
Qty: 90 TABLET | Refills: 3 | Status: SHIPPED | OUTPATIENT
Start: 2023-05-09

## 2023-05-09 NOTE — PROGRESS NOTES
Jacob Barnett (:  1945) is a 65 y.o. male,Established patient, here for evaluation of the following chief complaint(s):  Follow-up           HPI      65 y.o. male with hx of  DM - 2,  Ischemic cardiomyopathy, s.p CABG, AICD , HTn, CVA , prostate enlargement here for regular f.w      Since last time, pt reports he has been doing ok with no new issues of chest pain or sob  Ongoing Weight improvement  noted, no lung symptoms. poor appetite per wife with dysphagia issues and is a picky eater  Has hearing loss  but no memory issues    No new dizziness   No falls    Still with occasional dysphagia but being cautious      Hx of CAD with ischemic CM, low EF, s.p CABG in 2016    had AICD placed in 2018  . EF slightly improved on follow up echo   Currently remains compliant with meds and fluid intake   Denies any active chest pain or sob  Remains active at home  No recent pedal edema        Admission to  [facility] 2017 for right MCA stroke ( mulitple ) with left UE , left facial weakness and dysphagia with slurred speech     Workup with MRI confirmed right MCA multiple strokes.  No Afibb noted in TELE. ECHO with no thrombus  Added plavix to asa , changed to dysphagia diet   No issues since then   Has been off plavix for few yrs now and only on ASA      DM- 2   Compliant with meds but does not monitor sugars  Does not follow diabetic diet  , A1c at 6.7 recently   Off metformin for swallowing issues and Now on glipizide   Has seen EYE MD  No falls  No tingling ,numbness in feet    Independent of ADL , IADL   No depression   Lives with wife    Reports he plays video games a lot at night and sleeps in daytime   Getting refills from [address] of this note is copied from my previous notes and checked thoroughly and updated appropriately to reflect today's exam , findings and treatment selam          Allergies   Allergen Reactions    Iv Dye [Iodides] Anaphylaxis    Persantine [Dipyridamole]     Coreg [Carvedilol] Nausea And

## 2023-05-10 LAB
ALBUMIN SERPL-MCNC: 4.7 G/DL (ref 3.4–5)
ALBUMIN/GLOB SERPL: 1.5 {RATIO} (ref 1.1–2.2)
ALP SERPL-CCNC: 77 U/L (ref 40–129)
ALT SERPL-CCNC: 8 U/L (ref 10–40)
ANION GAP SERPL CALCULATED.3IONS-SCNC: 13 MMOL/L (ref 3–16)
AST SERPL-CCNC: 14 U/L (ref 15–37)
BILIRUB SERPL-MCNC: 0.4 MG/DL (ref 0–1)
BUN SERPL-MCNC: 15 MG/DL (ref 7–20)
CALCIUM SERPL-MCNC: 9.9 MG/DL (ref 8.3–10.6)
CHLORIDE SERPL-SCNC: 100 MMOL/L (ref 99–110)
CO2 SERPL-SCNC: 24 MMOL/L (ref 21–32)
CREAT SERPL-MCNC: 1.2 MG/DL (ref 0.8–1.3)
DIGOXIN SERPL-MCNC: 1.1 NG/ML (ref 0.8–2)
EST. AVERAGE GLUCOSE BLD GHB EST-MCNC: 145.6 MG/DL
GFR SERPLBLD CREATININE-BSD FMLA CKD-EPI: >60 ML/MIN/{1.73_M2}
GLUCOSE SERPL-MCNC: 149 MG/DL (ref 70–99)
HBA1C MFR BLD: 6.7 %
POTASSIUM SERPL-SCNC: 4.9 MMOL/L (ref 3.5–5.1)
PROT SERPL-MCNC: 7.9 G/DL (ref 6.4–8.2)
PSA SERPL DL<=0.01 NG/ML-MCNC: 0.65 NG/ML (ref 0–4)
SODIUM SERPL-SCNC: 137 MMOL/L (ref 136–145)

## 2023-05-26 RX ORDER — METOPROLOL SUCCINATE 25 MG/1
25 TABLET, EXTENDED RELEASE ORAL NIGHTLY
Qty: 90 TABLET | Refills: 0 | Status: SHIPPED | OUTPATIENT
Start: 2023-05-26

## 2023-07-10 ENCOUNTER — NURSE ONLY (OUTPATIENT)
Dept: CARDIOLOGY CLINIC | Age: 78
End: 2023-07-10
Payer: MEDICARE

## 2023-07-10 DIAGNOSIS — Z95.810 CARDIAC RESYNCHRONIZATION THERAPY DEFIBRILLATOR (CRT-D) IN PLACE: ICD-10-CM

## 2023-07-10 DIAGNOSIS — I42.0 DILATED CARDIOMYOPATHY (HCC): ICD-10-CM

## 2023-07-10 DIAGNOSIS — I50.42 CHRONIC COMBINED SYSTOLIC AND DIASTOLIC CONGESTIVE HEART FAILURE (HCC): Primary | ICD-10-CM

## 2023-07-10 PROCEDURE — 93296 REM INTERROG EVL PM/IDS: CPT | Performed by: INTERNAL MEDICINE

## 2023-07-10 PROCEDURE — 93295 DEV INTERROG REMOTE 1/2/MLT: CPT | Performed by: INTERNAL MEDICINE

## 2023-07-10 PROCEDURE — 93297 REM INTERROG DEV EVAL ICPMS: CPT | Performed by: INTERNAL MEDICINE

## 2023-10-10 PROCEDURE — 93297 REM INTERROG DEV EVAL ICPMS: CPT | Performed by: NURSE PRACTITIONER

## 2023-10-10 PROCEDURE — G2066 INTER DEVC REMOTE 30D: HCPCS | Performed by: NURSE PRACTITIONER

## 2023-10-19 PROCEDURE — 93296 REM INTERROG EVL PM/IDS: CPT | Performed by: INTERNAL MEDICINE

## 2023-10-19 PROCEDURE — 93295 DEV INTERROG REMOTE 1/2/MLT: CPT | Performed by: INTERNAL MEDICINE

## 2023-11-10 PROCEDURE — 93297 REM INTERROG DEV EVAL ICPMS: CPT | Performed by: NURSE PRACTITIONER

## 2023-11-10 PROCEDURE — G2066 INTER DEVC REMOTE 30D: HCPCS | Performed by: NURSE PRACTITIONER

## 2023-12-11 PROCEDURE — 93297 REM INTERROG DEV EVAL ICPMS: CPT | Performed by: NURSE PRACTITIONER

## 2023-12-11 PROCEDURE — G2066 INTER DEVC REMOTE 30D: HCPCS | Performed by: NURSE PRACTITIONER

## 2024-01-11 ENCOUNTER — OFFICE VISIT (OUTPATIENT)
Dept: INTERNAL MEDICINE CLINIC | Age: 79
End: 2024-01-11

## 2024-01-11 VITALS
DIASTOLIC BLOOD PRESSURE: 75 MMHG | HEIGHT: 65 IN | RESPIRATION RATE: 18 BRPM | SYSTOLIC BLOOD PRESSURE: 135 MMHG | BODY MASS INDEX: 22.49 KG/M2 | WEIGHT: 135 LBS | HEART RATE: 62 BPM

## 2024-01-11 DIAGNOSIS — I70.209 DIABETES MELLITUS TYPE 2 WITH ATHEROSCLEROSIS OF ARTERIES OF EXTREMITIES (HCC): ICD-10-CM

## 2024-01-11 DIAGNOSIS — I50.42 CHRONIC COMBINED SYSTOLIC AND DIASTOLIC CONGESTIVE HEART FAILURE (HCC): ICD-10-CM

## 2024-01-11 DIAGNOSIS — E11.51 DIABETES MELLITUS TYPE 2 WITH ATHEROSCLEROSIS OF ARTERIES OF EXTREMITIES (HCC): ICD-10-CM

## 2024-01-11 DIAGNOSIS — I42.0 DILATED CARDIOMYOPATHY (HCC): ICD-10-CM

## 2024-01-11 DIAGNOSIS — I25.10 CORONARY ARTERY DISEASE INVOLVING NATIVE CORONARY ARTERY OF NATIVE HEART WITHOUT ANGINA PECTORIS: ICD-10-CM

## 2024-01-11 DIAGNOSIS — Z95.1 S/P CABG X 3: ICD-10-CM

## 2024-01-11 DIAGNOSIS — N42.9 PROSTATE DISORDER: ICD-10-CM

## 2024-01-11 DIAGNOSIS — I50.42 CHRONIC COMBINED SYSTOLIC AND DIASTOLIC CONGESTIVE HEART FAILURE (HCC): Chronic | ICD-10-CM

## 2024-01-11 DIAGNOSIS — I10 ESSENTIAL HYPERTENSION: Primary | ICD-10-CM

## 2024-01-11 LAB
BASOPHILS # BLD: 0 K/UL (ref 0–0.2)
BASOPHILS NFR BLD: 0.4 %
DEPRECATED RDW RBC AUTO: 13.7 % (ref 12.4–15.4)
EOSINOPHIL # BLD: 0.3 K/UL (ref 0–0.6)
EOSINOPHIL NFR BLD: 4 %
HCT VFR BLD AUTO: 38.8 % (ref 40.5–52.5)
HGB BLD-MCNC: 12.8 G/DL (ref 13.5–17.5)
LYMPHOCYTES # BLD: 1.6 K/UL (ref 1–5.1)
LYMPHOCYTES NFR BLD: 19.3 %
MCH RBC QN AUTO: 28.2 PG (ref 26–34)
MCHC RBC AUTO-ENTMCNC: 33 G/DL (ref 31–36)
MCV RBC AUTO: 85.5 FL (ref 80–100)
MONOCYTES # BLD: 0.7 K/UL (ref 0–1.3)
MONOCYTES NFR BLD: 8.2 %
NEUTROPHILS # BLD: 5.6 K/UL (ref 1.7–7.7)
NEUTROPHILS NFR BLD: 68.1 %
PLATELET # BLD AUTO: 257 K/UL (ref 135–450)
PMV BLD AUTO: 8.9 FL (ref 5–10.5)
RBC # BLD AUTO: 4.53 M/UL (ref 4.2–5.9)
WBC # BLD AUTO: 8.2 K/UL (ref 4–11)

## 2024-01-11 PROCEDURE — G8420 CALC BMI NORM PARAMETERS: HCPCS | Performed by: INTERNAL MEDICINE

## 2024-01-11 PROCEDURE — 93297 REM INTERROG DEV EVAL ICPMS: CPT | Performed by: NURSE PRACTITIONER

## 2024-01-11 PROCEDURE — 1036F TOBACCO NON-USER: CPT | Performed by: INTERNAL MEDICINE

## 2024-01-11 PROCEDURE — G8427 DOCREV CUR MEDS BY ELIG CLIN: HCPCS | Performed by: INTERNAL MEDICINE

## 2024-01-11 PROCEDURE — 3075F SYST BP GE 130 - 139MM HG: CPT | Performed by: INTERNAL MEDICINE

## 2024-01-11 PROCEDURE — 3078F DIAST BP <80 MM HG: CPT | Performed by: INTERNAL MEDICINE

## 2024-01-11 PROCEDURE — 99213 OFFICE O/P EST LOW 20 MIN: CPT | Performed by: INTERNAL MEDICINE

## 2024-01-11 PROCEDURE — 1123F ACP DISCUSS/DSCN MKR DOCD: CPT | Performed by: INTERNAL MEDICINE

## 2024-01-11 PROCEDURE — G8484 FLU IMMUNIZE NO ADMIN: HCPCS | Performed by: INTERNAL MEDICINE

## 2024-01-11 ASSESSMENT — PATIENT HEALTH QUESTIONNAIRE - PHQ9
SUM OF ALL RESPONSES TO PHQ QUESTIONS 1-9: 0
1. LITTLE INTEREST OR PLEASURE IN DOING THINGS: 0
2. FEELING DOWN, DEPRESSED OR HOPELESS: 0
SUM OF ALL RESPONSES TO PHQ9 QUESTIONS 1 & 2: 0
SUM OF ALL RESPONSES TO PHQ QUESTIONS 1-9: 0

## 2024-01-11 NOTE — PROGRESS NOTES
Eh Hwang (:  1945) is a 78 y.o. male,Established patient, here for evaluation of the following chief complaint(s):  Follow-up           HPI      78 y.o. male with hx of  DM - 2,  Ischemic cardiomyopathy, s.p CABG, AICD , HTn, CVA , prostate enlargement here for regular f.w      Since last time, pt remains same as before ,   Does not do much activity   Plays video games a lot per wife   no new issues of chest pain or sob  Ongoing Weight improvement  noted, no lung symptoms.poor appetite per wife with dysphagia issues and is a picky eater  Has hearing loss  but no memory issues    No new dizziness   No falls      Hx of CAD with ischemic CM, low EF, s.p CABG in 2016    had AICD placed in 2018  . EF slightly improved on follow up echo   Currently remains compliant with meds and fluid intake   Denies any active chest pain or sob  Remains active at home  No recent pedal edema        Admission to  [facility] 2017 for right MCA stroke ( mulitple ) with left UE , left facial weakness and dysphagia with slurred speech     Workup with MRI confirmed right MCA multiple strokes. No Afibb noted in TELE. ECHO with no thrombus  Added plavix to asa , changed to dysphagia diet   No issues since then   Has been off plavix for few yrs now and only on ASA      DM- 2   Compliant with meds but does not monitor sugars  Does not follow diabetic diet  , A1c at 6.7 recently   Off metformin for swallowing issues and Now on glipizide   Has seen EYE MD  No falls  No tingling ,numbness in feet    Independent of ADL , IADL   No depression   Lives with wife    Reports he plays video games a lot at night and sleeps in daytime   [facility]    Parts of this note is copied from my previous notes and checked thoroughly and updated appropriately to reflect today's exam , findings and treatment selam          Allergies   Allergen Reactions    Iv Dye [Iodides] Anaphylaxis    Persantine [Dipyridamole]     Coreg [Carvedilol] Nausea And

## 2024-01-12 LAB
ALBUMIN SERPL-MCNC: 4.3 G/DL (ref 3.4–5)
ALBUMIN/GLOB SERPL: 1.7 {RATIO} (ref 1.1–2.2)
ALP SERPL-CCNC: 88 U/L (ref 40–129)
ALT SERPL-CCNC: 12 U/L (ref 10–40)
ANION GAP SERPL CALCULATED.3IONS-SCNC: 15 MMOL/L (ref 3–16)
AST SERPL-CCNC: 16 U/L (ref 15–37)
BILIRUB SERPL-MCNC: <0.2 MG/DL (ref 0–1)
BUN SERPL-MCNC: 6 MG/DL (ref 7–20)
CALCIUM SERPL-MCNC: 9.2 MG/DL (ref 8.3–10.6)
CHLORIDE SERPL-SCNC: 102 MMOL/L (ref 99–110)
CO2 SERPL-SCNC: 21 MMOL/L (ref 21–32)
CREAT SERPL-MCNC: 1 MG/DL (ref 0.8–1.3)
DIGOXIN SERPL-MCNC: 0.6 NG/ML (ref 0.8–2)
EST. AVERAGE GLUCOSE BLD GHB EST-MCNC: 197.3 MG/DL
GFR SERPLBLD CREATININE-BSD FMLA CKD-EPI: >60 ML/MIN/{1.73_M2}
GLUCOSE SERPL-MCNC: 339 MG/DL (ref 70–99)
HBA1C MFR BLD: 8.5 %
NT-PROBNP SERPL-MCNC: 231 PG/ML (ref 0–449)
POTASSIUM SERPL-SCNC: 4.3 MMOL/L (ref 3.5–5.1)
SODIUM SERPL-SCNC: 138 MMOL/L (ref 136–145)

## 2024-01-18 PROCEDURE — 93295 DEV INTERROG REMOTE 1/2/MLT: CPT | Performed by: INTERNAL MEDICINE

## 2024-01-18 PROCEDURE — 93296 REM INTERROG EVL PM/IDS: CPT | Performed by: INTERNAL MEDICINE

## 2024-01-29 ENCOUNTER — TELEPHONE (OUTPATIENT)
Dept: INTERNAL MEDICINE CLINIC | Age: 79
End: 2024-01-29

## 2024-01-29 RX ORDER — FERROUS SULFATE 325(65) MG
325 TABLET ORAL
Qty: 60 TABLET | Refills: 0 | Status: SHIPPED | OUTPATIENT
Start: 2024-01-29 | End: 2024-03-29

## 2024-02-11 PROCEDURE — 93297 REM INTERROG DEV EVAL ICPMS: CPT | Performed by: NURSE PRACTITIONER

## 2024-03-26 ENCOUNTER — OFFICE VISIT (OUTPATIENT)
Dept: INTERNAL MEDICINE CLINIC | Age: 79
End: 2024-03-26

## 2024-03-26 ENCOUNTER — HOSPITAL ENCOUNTER (OUTPATIENT)
Age: 79
Discharge: HOME | End: 2024-03-26
Payer: MEDICARE

## 2024-03-26 VITALS
RESPIRATION RATE: 14 BRPM | HEIGHT: 65 IN | DIASTOLIC BLOOD PRESSURE: 70 MMHG | SYSTOLIC BLOOD PRESSURE: 130 MMHG | BODY MASS INDEX: 21.99 KG/M2 | WEIGHT: 132 LBS | HEART RATE: 77 BPM

## 2024-03-26 DIAGNOSIS — I70.209 DIABETES MELLITUS TYPE 2 WITH ATHEROSCLEROSIS OF ARTERIES OF EXTREMITIES (HCC): ICD-10-CM

## 2024-03-26 DIAGNOSIS — E11.51 DIABETES MELLITUS TYPE 2 WITH ATHEROSCLEROSIS OF ARTERIES OF EXTREMITIES (HCC): Primary | ICD-10-CM

## 2024-03-26 DIAGNOSIS — E11.51 DIABETES MELLITUS TYPE 2 WITH ATHEROSCLEROSIS OF ARTERIES OF EXTREMITIES (HCC): ICD-10-CM

## 2024-03-26 DIAGNOSIS — I70.209 DIABETES MELLITUS TYPE 2 WITH ATHEROSCLEROSIS OF ARTERIES OF EXTREMITIES (HCC): Primary | ICD-10-CM

## 2024-03-26 LAB
ALBUMIN SERPL-MCNC: 4.4 G/DL (ref 3.4–5)
ALBUMIN/GLOB SERPL: 1.3 {RATIO} (ref 1.1–2.2)
ALP SERPL-CCNC: 70 U/L (ref 40–129)
ALT SERPL-CCNC: 14 U/L (ref 10–40)
ANION GAP SERPL CALCULATED.3IONS-SCNC: 15 MMOL/L (ref 3–16)
AST SERPL-CCNC: 17 U/L (ref 15–37)
BASOPHILS # BLD: 0.1 K/UL (ref 0–0.2)
BASOPHILS NFR BLD: 0.7 %
BILIRUB SERPL-MCNC: 0.4 MG/DL (ref 0–1)
BUN SERPL-MCNC: 10 MG/DL (ref 7–20)
CALCIUM SERPL-MCNC: 10.2 MG/DL (ref 8.3–10.6)
CHLORIDE SERPL-SCNC: 101 MMOL/L (ref 99–110)
CO2 SERPL-SCNC: 23 MMOL/L (ref 21–32)
CREAT SERPL-MCNC: 1.2 MG/DL (ref 0.8–1.3)
DEPRECATED RDW RBC AUTO: 13.9 % (ref 12.4–15.4)
EOSINOPHIL # BLD: 0.4 K/UL (ref 0–0.6)
EOSINOPHIL NFR BLD: 4.1 %
GFR SERPLBLD CREATININE-BSD FMLA CKD-EPI: 61 ML/MIN/{1.73_M2}
GLUCOSE SERPL-MCNC: 227 MG/DL (ref 70–99)
HCT VFR BLD AUTO: 39.2 % (ref 40.5–52.5)
HGB BLD-MCNC: 13.5 G/DL (ref 13.5–17.5)
LYMPHOCYTES # BLD: 2 K/UL (ref 1–5.1)
LYMPHOCYTES NFR BLD: 20.3 %
MCH RBC QN AUTO: 29.4 PG (ref 26–34)
MCHC RBC AUTO-ENTMCNC: 34.3 G/DL (ref 31–36)
MCV RBC AUTO: 85.7 FL (ref 80–100)
MONOCYTES # BLD: 0.8 K/UL (ref 0–1.3)
MONOCYTES NFR BLD: 7.9 %
NEUTROPHILS # BLD: 6.7 K/UL (ref 1.7–7.7)
NEUTROPHILS NFR BLD: 67 %
PLATELET # BLD AUTO: 237 K/UL (ref 135–450)
PMV BLD AUTO: 8.6 FL (ref 5–10.5)
POTASSIUM SERPL-SCNC: 4.4 MMOL/L (ref 3.5–5.1)
PROT SERPL-MCNC: 7.7 G/DL (ref 6.4–8.2)
RBC # BLD AUTO: 4.58 M/UL (ref 4.2–5.9)
SODIUM SERPL-SCNC: 139 MMOL/L (ref 136–145)
WBC # BLD AUTO: 9.9 K/UL (ref 4–11)

## 2024-03-26 PROCEDURE — 83036 HEMOGLOBIN GLYCOSYLATED A1C: CPT

## 2024-03-26 PROCEDURE — 85025 COMPLETE CBC W/AUTO DIFF WBC: CPT

## 2024-03-26 PROCEDURE — G8484 FLU IMMUNIZE NO ADMIN: HCPCS | Performed by: NURSE PRACTITIONER

## 2024-03-26 PROCEDURE — 99213 OFFICE O/P EST LOW 20 MIN: CPT | Performed by: NURSE PRACTITIONER

## 2024-03-26 PROCEDURE — 3052F HG A1C>EQUAL 8.0%<EQUAL 9.0%: CPT | Performed by: NURSE PRACTITIONER

## 2024-03-26 PROCEDURE — G8427 DOCREV CUR MEDS BY ELIG CLIN: HCPCS | Performed by: NURSE PRACTITIONER

## 2024-03-26 PROCEDURE — 1036F TOBACCO NON-USER: CPT | Performed by: NURSE PRACTITIONER

## 2024-03-26 PROCEDURE — 80053 COMPREHEN METABOLIC PANEL: CPT

## 2024-03-26 PROCEDURE — 3078F DIAST BP <80 MM HG: CPT | Performed by: NURSE PRACTITIONER

## 2024-03-26 PROCEDURE — G8420 CALC BMI NORM PARAMETERS: HCPCS | Performed by: NURSE PRACTITIONER

## 2024-03-26 PROCEDURE — 1123F ACP DISCUSS/DSCN MKR DOCD: CPT | Performed by: NURSE PRACTITIONER

## 2024-03-26 PROCEDURE — 3075F SYST BP GE 130 - 139MM HG: CPT | Performed by: NURSE PRACTITIONER

## 2024-03-26 PROCEDURE — 36415 COLL VENOUS BLD VENIPUNCTURE: CPT

## 2024-03-26 RX ORDER — ACYCLOVIR 400 MG/1
TABLET ORAL
Qty: 2 EACH | Refills: 1 | Status: SHIPPED | OUTPATIENT
Start: 2024-03-26

## 2024-03-26 ASSESSMENT — ENCOUNTER SYMPTOMS
CONSTIPATION: 1
DIARRHEA: 1
SHORTNESS OF BREATH: 0

## 2024-03-26 NOTE — PROGRESS NOTES
Chief Complaint:   Eh Hwang is a 78 y.o. male who presents for   Chief Complaint   Patient presents with    Other       HPI    Patient presents to the office with c/o no energy and not feeling good.   Not taking Glipizide.   He doesn't eat much.  He snacks.  Has trouble swallowing.   His sugars have been around 300-400.     Also having issues with diarrhea and constipation.       Review of Systems  Review of Systems   Constitutional:  Positive for fatigue.   Respiratory:  Negative for shortness of breath.    Cardiovascular:  Negative for chest pain.   Gastrointestinal:  Positive for constipation and diarrhea.     See HPI    Allergies  Iv dye [iodides], Persantine [dipyridamole], and Coreg [carvedilol]      Vitals  /70 (Site: Right Upper Arm, Position: Sitting)   Pulse 77   Resp 14   Ht 1.651 m (5' 5\")   Wt 59.9 kg (132 lb)   BMI 21.97 kg/m²     Current Medications  Current Outpatient Medications   Medication Sig Dispense Refill    ferrous sulfate (IRON 325) 325 (65 Fe) MG tablet Take 1 tablet by mouth daily (with breakfast) 60 tablet 0    metoprolol succinate (TOPROL XL) 25 MG extended release tablet Take 1 tablet by mouth nightly 90 tablet 0    glipiZIDE (GLUCOTROL) 5 MG tablet Take 1 tablet by mouth 2 times daily (before meals) 180 tablet 0    digoxin (LANOXIN) 125 MCG tablet Take 1 tablet by mouth daily 90 tablet 3    lisinopril (PRINIVIL;ZESTRIL) 5 MG tablet Take 2 tablets by mouth daily 180 tablet 0    atorvastatin (LIPITOR) 40 MG tablet Take 1 tablet by mouth nightly 90 tablet 3    finasteride (PROSCAR) 5 MG tablet Take 1 tablet by mouth daily 90 tablet 3    pantoprazole (PROTONIX) 40 MG tablet Take 1 tablet by mouth daily 90 tablet 1    aspirin 81 MG tablet Take 81 mg by mouth daily        No current facility-administered medications for this visit.       Past Medical History  Past Medical History:   Diagnosis Date    Abnormal echocardiogram 09/2016    Abnormal stress test 09/2016

## 2024-03-27 LAB
EST. AVERAGE GLUCOSE BLD GHB EST-MCNC: 200.1 MG/DL
HBA1C MFR BLD: 8.6 %

## 2024-03-29 RX ORDER — METFORMIN HYDROCHLORIDE 500 MG/1
500 TABLET, EXTENDED RELEASE ORAL
Qty: 90 TABLET | Refills: 0 | Status: SHIPPED | OUTPATIENT
Start: 2024-03-29

## 2024-04-16 ENCOUNTER — TELEPHONE (OUTPATIENT)
Dept: INTERNAL MEDICINE CLINIC | Age: 79
End: 2024-04-16

## 2024-04-16 RX ORDER — GLIPIZIDE 2.5 MG/1
2.5 TABLET, EXTENDED RELEASE ORAL DAILY
Qty: 90 TABLET | Refills: 0 | Status: SHIPPED | OUTPATIENT
Start: 2024-04-16

## 2024-04-16 NOTE — TELEPHONE ENCOUNTER
----- Message from Blake Abdul MD sent at 4/16/2024  2:35 PM EDT -----  Contact: [phone] Sarah  No I gave extended release one    ----- Message -----  From: Dora Murray  Sent: 4/16/2024   1:44 PM EDT  To: Blaek Abdul MD    Daughter states that patient can't take glipizide because it's fast acting and he doesn't eat full meals, only snacks and it causes his sugars to drop to low. She is requesting something long acting.  Please advise.   ----- Message -----  From: Blake Abdul MD  Sent: 4/16/2024  12:44 PM EDT  To: Dora Trevor    Stop metformin  Start on glipizide 2.5 mg xr daily #90    ----- Message -----  From: Dora Murray  Sent: 4/15/2024   4:32 PM EDT  To: Blake Abdul MD    Daughter states that even cutting it in half he is choking on it. Please advise.   ----- Message -----  From: Blake Abdul MD  Sent: 4/15/2024   4:06 PM EDT  To: Emely Corbett MA    Can cut to half and can take morning , noon, evening    ----- Message -----  From: Jennifer Hernandez MA  Sent: 4/15/2024   2:15 PM EDT  To: Blake Abdul MD    Patients daughter, Sarah, states that the patient has been having trouble taking his Metformin. The pill is so large that he chokes on it when he tries to take it. They want to know if there is a smaller pill that he can take. Please advise.       [facility] in [place]

## 2024-05-16 ENCOUNTER — OFFICE VISIT (OUTPATIENT)
Dept: INTERNAL MEDICINE CLINIC | Age: 79
End: 2024-05-16

## 2024-05-16 VITALS
BODY MASS INDEX: 22.16 KG/M2 | HEART RATE: 60 BPM | SYSTOLIC BLOOD PRESSURE: 130 MMHG | WEIGHT: 133 LBS | RESPIRATION RATE: 18 BRPM | HEIGHT: 65 IN | DIASTOLIC BLOOD PRESSURE: 75 MMHG

## 2024-05-16 DIAGNOSIS — I10 ESSENTIAL HYPERTENSION: ICD-10-CM

## 2024-05-16 DIAGNOSIS — N42.9 PROSTATE DISORDER: ICD-10-CM

## 2024-05-16 DIAGNOSIS — Z86.73 HISTORY OF STROKE: ICD-10-CM

## 2024-05-16 DIAGNOSIS — Z95.1 S/P CABG X 3: ICD-10-CM

## 2024-05-16 DIAGNOSIS — I25.10 CORONARY ARTERY DISEASE INVOLVING NATIVE CORONARY ARTERY OF NATIVE HEART WITHOUT ANGINA PECTORIS: ICD-10-CM

## 2024-05-16 DIAGNOSIS — I70.209 DIABETES MELLITUS TYPE 2 WITH ATHEROSCLEROSIS OF ARTERIES OF EXTREMITIES (HCC): ICD-10-CM

## 2024-05-16 DIAGNOSIS — I50.42 CHRONIC COMBINED SYSTOLIC AND DIASTOLIC CONGESTIVE HEART FAILURE (HCC): ICD-10-CM

## 2024-05-16 DIAGNOSIS — Z00.00 MEDICARE ANNUAL WELLNESS VISIT, SUBSEQUENT: Primary | ICD-10-CM

## 2024-05-16 DIAGNOSIS — E11.51 DIABETES MELLITUS TYPE 2 WITH ATHEROSCLEROSIS OF ARTERIES OF EXTREMITIES (HCC): ICD-10-CM

## 2024-05-16 LAB
ALBUMIN SERPL-MCNC: 4.3 G/DL (ref 3.4–5)
ALBUMIN/GLOB SERPL: 1.4 {RATIO} (ref 1.1–2.2)
ALP SERPL-CCNC: 72 U/L (ref 40–129)
ALT SERPL-CCNC: 11 U/L (ref 10–40)
ANION GAP SERPL CALCULATED.3IONS-SCNC: 12 MMOL/L (ref 3–16)
AST SERPL-CCNC: 19 U/L (ref 15–37)
BILIRUB SERPL-MCNC: 0.3 MG/DL (ref 0–1)
BILIRUBIN, POC: NORMAL
BLOOD URINE, POC: NORMAL
BUN SERPL-MCNC: 9 MG/DL (ref 7–20)
CALCIUM SERPL-MCNC: 10 MG/DL (ref 8.3–10.6)
CHLORIDE SERPL-SCNC: 101 MMOL/L (ref 99–110)
CHOLEST SERPL-MCNC: 141 MG/DL (ref 0–199)
CLARITY, POC: NORMAL
CO2 SERPL-SCNC: 27 MMOL/L (ref 21–32)
COLOR, POC: NORMAL
CREAT SERPL-MCNC: 1.1 MG/DL (ref 0.8–1.3)
CREAT UR-MCNC: 48.4 MG/DL (ref 39–259)
GFR SERPLBLD CREATININE-BSD FMLA CKD-EPI: 68 ML/MIN/{1.73_M2}
GLUCOSE SERPL-MCNC: 155 MG/DL (ref 70–99)
GLUCOSE URINE, POC: NORMAL
HDLC SERPL-MCNC: 30 MG/DL (ref 40–60)
KETONES, POC: NORMAL
LDLC SERPL CALC-MCNC: 60 MG/DL
LEUKOCYTE EST, POC: NORMAL
MICROALBUMIN UR DL<=1MG/L-MCNC: <1.2 MG/DL
MICROALBUMIN/CREAT UR: NORMAL MG/G (ref 0–30)
NITRITE, POC: NORMAL
PH, POC: NORMAL
POTASSIUM SERPL-SCNC: 4.5 MMOL/L (ref 3.5–5.1)
PROT SERPL-MCNC: 7.3 G/DL (ref 6.4–8.2)
PROTEIN, POC: NORMAL
PSA SERPL DL<=0.01 NG/ML-MCNC: 0.64 NG/ML (ref 0–4)
SODIUM SERPL-SCNC: 140 MMOL/L (ref 136–145)
SPECIFIC GRAVITY, POC: NORMAL
TRIGL SERPL-MCNC: 255 MG/DL (ref 0–150)
TSH SERPL DL<=0.005 MIU/L-ACNC: 2.61 UIU/ML (ref 0.27–4.2)
URATE SERPL-MCNC: 6.9 MG/DL (ref 3.5–7.2)
UROBILINOGEN, POC: NORMAL
VLDLC SERPL CALC-MCNC: 51 MG/DL

## 2024-05-16 PROCEDURE — 3052F HG A1C>EQUAL 8.0%<EQUAL 9.0%: CPT | Performed by: INTERNAL MEDICINE

## 2024-05-16 PROCEDURE — 81002 URINALYSIS NONAUTO W/O SCOPE: CPT | Performed by: INTERNAL MEDICINE

## 2024-05-16 PROCEDURE — 3075F SYST BP GE 130 - 139MM HG: CPT | Performed by: INTERNAL MEDICINE

## 2024-05-16 PROCEDURE — G0439 PPPS, SUBSEQ VISIT: HCPCS | Performed by: INTERNAL MEDICINE

## 2024-05-16 PROCEDURE — 3078F DIAST BP <80 MM HG: CPT | Performed by: INTERNAL MEDICINE

## 2024-05-16 PROCEDURE — 1123F ACP DISCUSS/DSCN MKR DOCD: CPT | Performed by: INTERNAL MEDICINE

## 2024-05-16 RX ORDER — ACYCLOVIR 400 MG/1
TABLET ORAL
Qty: 3 EACH | Refills: 5 | Status: SHIPPED | OUTPATIENT
Start: 2024-05-16

## 2024-05-16 RX ORDER — ACYCLOVIR 400 MG/1
TABLET ORAL
Qty: 3 EACH | Refills: 5 | Status: SHIPPED | OUTPATIENT
Start: 2024-05-16 | End: 2024-05-16 | Stop reason: SDUPTHER

## 2024-05-16 ASSESSMENT — PATIENT HEALTH QUESTIONNAIRE - PHQ9
1. LITTLE INTEREST OR PLEASURE IN DOING THINGS: MORE THAN HALF THE DAYS
SUM OF ALL RESPONSES TO PHQ QUESTIONS 1-9: 2
SUM OF ALL RESPONSES TO PHQ9 QUESTIONS 1 & 2: 2
2. FEELING DOWN, DEPRESSED OR HOPELESS: NOT AT ALL
SUM OF ALL RESPONSES TO PHQ QUESTIONS 1-9: 2

## 2024-05-16 NOTE — PROGRESS NOTES
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  Recommended screening schedule for the next 5-10 years is provided to the patient in written form: see Patient Instructions/AVS.     No follow-ups on file.     Subjective         78 y.o. male with hx of  DM - 2,  Ischemic cardiomyopathy, s.p CABG, AICD , HTn, CVA , prostate enlargement here for medicare wellness visit    Since last time, pt remains same as before ,     Does not do much activity   Plays video games a lot per wife   no new issues of chest pain or sob  Ongoing Weight improvement  noted, no lung symptoms.poor appetite per wife with dysphagia issues and is a picky eater  Has hearing loss  but no memory issues    No new dizziness   No falls      Hx of CAD with ischemic CM, low EF, s.p CABG in 2016    had AICD placed in 2018  . EF slightly improved on follow up echo   Currently remains compliant with meds and fluid intake   Denies any active chest pain or sob  Remains active at home  No recent pedal edema        Admission to  [facility] 6/2017 for

## 2024-05-17 LAB
EST. AVERAGE GLUCOSE BLD GHB EST-MCNC: 171.4 MG/DL
HBA1C MFR BLD: 7.6 %

## 2024-07-15 PROCEDURE — 93297 REM INTERROG DEV EVAL ICPMS: CPT | Performed by: NURSE PRACTITIONER

## 2024-07-15 RX ORDER — GLIPIZIDE 2.5 MG/1
2.5 TABLET, EXTENDED RELEASE ORAL DAILY
Qty: 90 TABLET | Refills: 0 | Status: SHIPPED | OUTPATIENT
Start: 2024-07-15

## 2024-08-14 DIAGNOSIS — E11.51 DIABETES MELLITUS TYPE 2 WITH ATHEROSCLEROSIS OF ARTERIES OF EXTREMITIES (HCC): ICD-10-CM

## 2024-08-14 DIAGNOSIS — I70.209 DIABETES MELLITUS TYPE 2 WITH ATHEROSCLEROSIS OF ARTERIES OF EXTREMITIES (HCC): ICD-10-CM

## 2024-08-14 RX ORDER — GLIPIZIDE 2.5 MG/1
2.5 TABLET, EXTENDED RELEASE ORAL DAILY
Qty: 90 TABLET | Refills: 0 | Status: SHIPPED | OUTPATIENT
Start: 2024-08-14

## 2024-08-14 RX ORDER — ACYCLOVIR 400 MG/1
TABLET ORAL
Qty: 3 EACH | Refills: 5 | Status: SHIPPED | OUTPATIENT
Start: 2024-08-14

## 2024-10-17 PROCEDURE — 93296 REM INTERROG EVL PM/IDS: CPT | Performed by: INTERNAL MEDICINE

## 2024-10-17 PROCEDURE — 93295 DEV INTERROG REMOTE 1/2/MLT: CPT | Performed by: INTERNAL MEDICINE

## 2024-11-11 RX ORDER — GLIPIZIDE 2.5 MG/1
2.5 TABLET, EXTENDED RELEASE ORAL DAILY
Qty: 90 TABLET | Refills: 0 | Status: SHIPPED | OUTPATIENT
Start: 2024-11-11

## 2024-11-16 PROCEDURE — 93297 REM INTERROG DEV EVAL ICPMS: CPT | Performed by: NURSE PRACTITIONER

## 2024-11-18 ENCOUNTER — OFFICE VISIT (OUTPATIENT)
Dept: INTERNAL MEDICINE CLINIC | Age: 79
End: 2024-11-18

## 2024-11-18 ENCOUNTER — HOSPITAL ENCOUNTER (OUTPATIENT)
Age: 79
Discharge: HOME | End: 2024-11-18
Payer: MEDICARE

## 2024-11-18 VITALS
RESPIRATION RATE: 18 BRPM | DIASTOLIC BLOOD PRESSURE: 75 MMHG | WEIGHT: 127 LBS | HEIGHT: 65 IN | BODY MASS INDEX: 21.16 KG/M2 | SYSTOLIC BLOOD PRESSURE: 110 MMHG | HEART RATE: 65 BPM

## 2024-11-18 DIAGNOSIS — Z86.73 HISTORY OF STROKE: ICD-10-CM

## 2024-11-18 DIAGNOSIS — I50.42 CHRONIC COMBINED SYSTOLIC AND DIASTOLIC CONGESTIVE HEART FAILURE (HCC): ICD-10-CM

## 2024-11-18 DIAGNOSIS — Z95.1 S/P CABG X 3: ICD-10-CM

## 2024-11-18 DIAGNOSIS — E11.51 DIABETES MELLITUS TYPE 2 WITH ATHEROSCLEROSIS OF ARTERIES OF EXTREMITIES (HCC): ICD-10-CM

## 2024-11-18 DIAGNOSIS — I10 ESSENTIAL HYPERTENSION: Primary | ICD-10-CM

## 2024-11-18 DIAGNOSIS — I70.209 DIABETES MELLITUS TYPE 2 WITH ATHEROSCLEROSIS OF ARTERIES OF EXTREMITIES (HCC): ICD-10-CM

## 2024-11-18 DIAGNOSIS — N42.9 PROSTATE DISORDER: ICD-10-CM

## 2024-11-18 DIAGNOSIS — I25.10 CORONARY ARTERY DISEASE INVOLVING NATIVE CORONARY ARTERY OF NATIVE HEART WITHOUT ANGINA PECTORIS: ICD-10-CM

## 2024-11-18 DIAGNOSIS — I10 ESSENTIAL HYPERTENSION: ICD-10-CM

## 2024-11-18 LAB
ALBUMIN SERPL-MCNC: 4.2 G/DL (ref 3.4–5)
ALBUMIN/GLOB SERPL: 1.3 {RATIO} (ref 1.1–2.2)
ALP SERPL-CCNC: 62 U/L (ref 40–129)
ALT SERPL-CCNC: 7 U/L (ref 10–40)
ANION GAP SERPL CALCULATED.3IONS-SCNC: 11 MMOL/L (ref 3–16)
AST SERPL-CCNC: 21 U/L (ref 15–37)
BASOPHILS # BLD: 0.1 K/UL (ref 0–0.2)
BASOPHILS NFR BLD: 0.8 %
BILIRUB SERPL-MCNC: 0.5 MG/DL (ref 0–1)
BUN SERPL-MCNC: 9 MG/DL (ref 7–20)
CALCIUM SERPL-MCNC: 9.3 MG/DL (ref 8.3–10.6)
CHLORIDE SERPL-SCNC: 104 MMOL/L (ref 99–110)
CO2 SERPL-SCNC: 26 MMOL/L (ref 21–32)
CREAT SERPL-MCNC: 1.2 MG/DL (ref 0.8–1.3)
DEPRECATED RDW RBC AUTO: 13.8 % (ref 12.4–15.4)
EOSINOPHIL # BLD: 0.3 K/UL (ref 0–0.6)
EOSINOPHIL NFR BLD: 2.4 %
EST. AVERAGE GLUCOSE BLD GHB EST-MCNC: 125.5 MG/DL
GFR SERPLBLD CREATININE-BSD FMLA CKD-EPI: 61 ML/MIN/{1.73_M2}
GLUCOSE SERPL-MCNC: 130 MG/DL (ref 70–99)
HBA1C MFR BLD: 6 %
HCT VFR BLD AUTO: 39.7 % (ref 40.5–52.5)
HGB BLD-MCNC: 13.3 G/DL (ref 13.5–17.5)
LYMPHOCYTES # BLD: 1.7 K/UL (ref 1–5.1)
LYMPHOCYTES NFR BLD: 15.8 %
MCH RBC QN AUTO: 29.1 PG (ref 26–34)
MCHC RBC AUTO-ENTMCNC: 33.5 G/DL (ref 31–36)
MCV RBC AUTO: 87 FL (ref 80–100)
MONOCYTES # BLD: 0.8 K/UL (ref 0–1.3)
MONOCYTES NFR BLD: 7.1 %
NEUTROPHILS # BLD: 8 K/UL (ref 1.7–7.7)
NEUTROPHILS NFR BLD: 73.9 %
PLATELET # BLD AUTO: 256 K/UL (ref 135–450)
PMV BLD AUTO: 8.2 FL (ref 5–10.5)
POTASSIUM SERPL-SCNC: 4.5 MMOL/L (ref 3.5–5.1)
PROT SERPL-MCNC: 7.5 G/DL (ref 6.4–8.2)
RBC # BLD AUTO: 4.56 M/UL (ref 4.2–5.9)
SODIUM SERPL-SCNC: 141 MMOL/L (ref 136–145)
WBC # BLD AUTO: 10.8 K/UL (ref 4–11)

## 2024-11-18 PROCEDURE — 3074F SYST BP LT 130 MM HG: CPT | Performed by: INTERNAL MEDICINE

## 2024-11-18 PROCEDURE — 1160F RVW MEDS BY RX/DR IN RCRD: CPT | Performed by: INTERNAL MEDICINE

## 2024-11-18 PROCEDURE — 85025 COMPLETE CBC W/AUTO DIFF WBC: CPT

## 2024-11-18 PROCEDURE — 3078F DIAST BP <80 MM HG: CPT | Performed by: INTERNAL MEDICINE

## 2024-11-18 PROCEDURE — G8484 FLU IMMUNIZE NO ADMIN: HCPCS | Performed by: INTERNAL MEDICINE

## 2024-11-18 PROCEDURE — 1159F MED LIST DOCD IN RCRD: CPT | Performed by: INTERNAL MEDICINE

## 2024-11-18 PROCEDURE — 83036 HEMOGLOBIN GLYCOSYLATED A1C: CPT

## 2024-11-18 PROCEDURE — G8420 CALC BMI NORM PARAMETERS: HCPCS | Performed by: INTERNAL MEDICINE

## 2024-11-18 PROCEDURE — 1123F ACP DISCUSS/DSCN MKR DOCD: CPT | Performed by: INTERNAL MEDICINE

## 2024-11-18 PROCEDURE — 99213 OFFICE O/P EST LOW 20 MIN: CPT | Performed by: INTERNAL MEDICINE

## 2024-11-18 PROCEDURE — G8427 DOCREV CUR MEDS BY ELIG CLIN: HCPCS | Performed by: INTERNAL MEDICINE

## 2024-11-18 PROCEDURE — 1036F TOBACCO NON-USER: CPT | Performed by: INTERNAL MEDICINE

## 2024-11-18 PROCEDURE — 80053 COMPREHEN METABOLIC PANEL: CPT

## 2024-11-18 PROCEDURE — 3051F HG A1C>EQUAL 7.0%<8.0%: CPT | Performed by: INTERNAL MEDICINE

## 2024-11-18 PROCEDURE — 36415 COLL VENOUS BLD VENIPUNCTURE: CPT

## 2024-11-18 NOTE — PROGRESS NOTES
Eh Hwang (:  1945) is a 79 y.o. male,Established patient, here for evaluation of the following chief complaint(s):  Follow-up      HPI      79 y.o. male with hx of  DM - 2,  Ischemic cardiomyopathy, s.p CABG, AICD , HTn, CVA , prostate enlargement here for regular f.w      Since last time, pt remains same as before ,   Does not do much activity   Plays video games a lot per daughter    no new issues of chest pain or sob      Recent EGD with esophagitis and esophageal stricture s.p dilatation and placed on PPI   Repeat EGD coming up this month    Continues to lose weight with poor oral intake despite recommendations. Drinks pop during daytime with high sugars and does not take much food per daughter and drops sugars in night time    poor appetite per  family with dysphagia issues and is a picky eater  Has hearing loss  but no memory issues    No new dizziness   No falls      Hx of CAD with ischemic CM, low EF, s.p CABG in 2016    had AICD placed in 2018  . EF slightly improved on follow up echo   Currently remains compliant with meds and fluid intake   Denies any active chest pain or sob  Remains active at home  No recent pedal edema        Admission to  [facility] 2017 for right MCA stroke ( mulitple ) with left UE , left facial weakness and dysphagia with slurred speech     Workup with MRI confirmed right MCA multiple strokes. No Afibb noted in TELE. ECHO with no thrombus  Added plavix to asa , changed to dysphagia diet   No issues since then   Has been off plavix for few yrs now and only on ASA      DM- 2   Compliant with meds now monitors with CGM  Does not follow diabetic diet  , A1c at 7.6  recently   Off metformin for swallowing issues and Now on glipizide  See above   Has seen EYE MD  No falls  No tingling ,numbness in feet    Independent of ADL , IADL   No depression   Lives with wife    Reports he plays video games a lot at night and sleeps in daytime   [facility]    Parts of this

## 2024-12-17 PROCEDURE — 93297 REM INTERROG DEV EVAL ICPMS: CPT | Performed by: NURSE PRACTITIONER

## 2025-02-17 PROCEDURE — 93297 REM INTERROG DEV EVAL ICPMS: CPT | Performed by: NURSE PRACTITIONER

## 2025-04-17 RX ORDER — GLIPIZIDE 2.5 MG/1
2.5 TABLET, EXTENDED RELEASE ORAL DAILY
Qty: 90 TABLET | Refills: 0 | Status: SHIPPED | OUTPATIENT
Start: 2025-04-17

## 2025-04-28 ENCOUNTER — OFFICE VISIT (OUTPATIENT)
Dept: INTERNAL MEDICINE CLINIC | Age: 80
End: 2025-04-28

## 2025-04-28 VITALS
HEART RATE: 60 BPM | WEIGHT: 126 LBS | HEIGHT: 65 IN | SYSTOLIC BLOOD PRESSURE: 110 MMHG | BODY MASS INDEX: 20.99 KG/M2 | RESPIRATION RATE: 18 BRPM | DIASTOLIC BLOOD PRESSURE: 75 MMHG

## 2025-04-28 DIAGNOSIS — I42.0 DILATED CARDIOMYOPATHY (HCC): ICD-10-CM

## 2025-04-28 DIAGNOSIS — I10 ESSENTIAL HYPERTENSION: ICD-10-CM

## 2025-04-28 DIAGNOSIS — Z86.73 HISTORY OF STROKE: ICD-10-CM

## 2025-04-28 DIAGNOSIS — I50.42 CHRONIC COMBINED SYSTOLIC AND DIASTOLIC CONGESTIVE HEART FAILURE (HCC): ICD-10-CM

## 2025-04-28 DIAGNOSIS — Z95.1 S/P CABG X 3: ICD-10-CM

## 2025-04-28 DIAGNOSIS — I70.209 DIABETES MELLITUS TYPE 2 WITH ATHEROSCLEROSIS OF ARTERIES OF EXTREMITIES (HCC): Primary | ICD-10-CM

## 2025-04-28 DIAGNOSIS — I25.10 CORONARY ARTERY DISEASE INVOLVING NATIVE CORONARY ARTERY OF NATIVE HEART WITHOUT ANGINA PECTORIS: ICD-10-CM

## 2025-04-28 DIAGNOSIS — E11.51 DIABETES MELLITUS TYPE 2 WITH ATHEROSCLEROSIS OF ARTERIES OF EXTREMITIES (HCC): Primary | ICD-10-CM

## 2025-04-28 DIAGNOSIS — E11.51 DIABETES MELLITUS TYPE 2 WITH ATHEROSCLEROSIS OF ARTERIES OF EXTREMITIES (HCC): ICD-10-CM

## 2025-04-28 DIAGNOSIS — I70.209 DIABETES MELLITUS TYPE 2 WITH ATHEROSCLEROSIS OF ARTERIES OF EXTREMITIES (HCC): ICD-10-CM

## 2025-04-28 DIAGNOSIS — N42.9 PROSTATE DISORDER: ICD-10-CM

## 2025-04-28 LAB
ALBUMIN SERPL-MCNC: 4.2 G/DL (ref 3.4–5)
ALBUMIN/GLOB SERPL: 1.6 {RATIO} (ref 1.1–2.2)
ALP SERPL-CCNC: 65 U/L (ref 40–129)
ALT SERPL-CCNC: 9 U/L (ref 10–40)
ANION GAP SERPL CALCULATED.3IONS-SCNC: 9 MMOL/L (ref 3–16)
AST SERPL-CCNC: 16 U/L (ref 15–37)
BASOPHILS # BLD: 0 K/UL (ref 0–0.2)
BASOPHILS NFR BLD: 0.4 %
BILIRUB SERPL-MCNC: 0.4 MG/DL (ref 0–1)
BUN SERPL-MCNC: 17 MG/DL (ref 7–20)
CALCIUM SERPL-MCNC: 9.6 MG/DL (ref 8.3–10.6)
CHLORIDE SERPL-SCNC: 102 MMOL/L (ref 99–110)
CO2 SERPL-SCNC: 26 MMOL/L (ref 21–32)
CREAT SERPL-MCNC: 1.1 MG/DL (ref 0.8–1.3)
DEPRECATED RDW RBC AUTO: 14.2 % (ref 12.4–15.4)
EOSINOPHIL # BLD: 0.2 K/UL (ref 0–0.6)
EOSINOPHIL NFR BLD: 2.1 %
GFR SERPLBLD CREATININE-BSD FMLA CKD-EPI: 68 ML/MIN/{1.73_M2}
GLUCOSE SERPL-MCNC: 91 MG/DL (ref 70–99)
HCT VFR BLD AUTO: 36.9 % (ref 40.5–52.5)
HGB BLD-MCNC: 12.5 G/DL (ref 13.5–17.5)
LYMPHOCYTES # BLD: 1.5 K/UL (ref 1–5.1)
LYMPHOCYTES NFR BLD: 13.7 %
MCH RBC QN AUTO: 28.5 PG (ref 26–34)
MCHC RBC AUTO-ENTMCNC: 33.9 G/DL (ref 31–36)
MCV RBC AUTO: 83.9 FL (ref 80–100)
MONOCYTES # BLD: 0.8 K/UL (ref 0–1.3)
MONOCYTES NFR BLD: 6.8 %
NEUTROPHILS # BLD: 8.6 K/UL (ref 1.7–7.7)
NEUTROPHILS NFR BLD: 77 %
PLATELET # BLD AUTO: 235 K/UL (ref 135–450)
PMV BLD AUTO: 9.1 FL (ref 5–10.5)
POTASSIUM SERPL-SCNC: 5.1 MMOL/L (ref 3.5–5.1)
PROT SERPL-MCNC: 6.9 G/DL (ref 6.4–8.2)
RBC # BLD AUTO: 4.4 M/UL (ref 4.2–5.9)
SODIUM SERPL-SCNC: 137 MMOL/L (ref 136–145)
WBC # BLD AUTO: 11.2 K/UL (ref 4–11)

## 2025-04-28 PROCEDURE — 3074F SYST BP LT 130 MM HG: CPT | Performed by: INTERNAL MEDICINE

## 2025-04-28 PROCEDURE — 3078F DIAST BP <80 MM HG: CPT | Performed by: INTERNAL MEDICINE

## 2025-04-28 PROCEDURE — G8427 DOCREV CUR MEDS BY ELIG CLIN: HCPCS | Performed by: INTERNAL MEDICINE

## 2025-04-28 PROCEDURE — 1159F MED LIST DOCD IN RCRD: CPT | Performed by: INTERNAL MEDICINE

## 2025-04-28 PROCEDURE — 99213 OFFICE O/P EST LOW 20 MIN: CPT | Performed by: INTERNAL MEDICINE

## 2025-04-28 PROCEDURE — 1160F RVW MEDS BY RX/DR IN RCRD: CPT | Performed by: INTERNAL MEDICINE

## 2025-04-28 PROCEDURE — 1123F ACP DISCUSS/DSCN MKR DOCD: CPT | Performed by: INTERNAL MEDICINE

## 2025-04-28 PROCEDURE — 1036F TOBACCO NON-USER: CPT | Performed by: INTERNAL MEDICINE

## 2025-04-28 PROCEDURE — G8420 CALC BMI NORM PARAMETERS: HCPCS | Performed by: INTERNAL MEDICINE

## 2025-04-28 RX ORDER — PANTOPRAZOLE SODIUM 40 MG/1
40 TABLET, DELAYED RELEASE ORAL DAILY
Qty: 90 TABLET | Refills: 1 | Status: SHIPPED | OUTPATIENT
Start: 2025-04-28

## 2025-04-28 RX ORDER — METOPROLOL SUCCINATE 25 MG/1
25 TABLET, EXTENDED RELEASE ORAL NIGHTLY
Qty: 90 TABLET | Refills: 2 | Status: SHIPPED | OUTPATIENT
Start: 2025-04-28

## 2025-04-28 NOTE — PROGRESS NOTES
Eh Hwang (:  1945) is a 79 y.o. male,Established patient, here for evaluation of the following chief complaint(s):  Follow-up      HPI      79 y.o. male with hx of  DM - 2,  Ischemic cardiomyopathy, s.p CABG, AICD , HTn, CVA , prostate enlargement here for regular f.w      Since last time, pt remains same as before ,   Does not do much activity per daughter   Plays video games a lot   no new issues of chest pain or sob      Recent EGD with esophagitis and esophageal stricture s.p dilatation and placed on PPI   Still with swallowing issues per daughter and she is not sure if he takes ppi any more     Weight loss has been stable but has poor oral intake despite recommendations. Drinks pop during daytime with high sugars and does not take much food per daughter and drops sugars in night time    poor appetite per  family with dysphagia issues and is a picky eater  Has hearing loss  but no memory issues    No new dizziness   No falls      Hx of CAD with ischemic CM, low EF, s.p CABG in 2016    had AICD placed in 2018  . EF slightly improved on follow up echo   Currently remains compliant with meds and fluid intake   Denies any active chest pain or sob  Remains active at home  No recent pedal edema        Admission to  [facility] 2017 for right MCA stroke ( mulitple ) with left UE , left facial weakness and dysphagia with slurred speech     Workup with MRI confirmed right MCA multiple strokes. No Afibb noted in TELE. ECHO with no thrombus  Added plavix to asa , changed to dysphagia diet   No issues since then   Has been off plavix for few yrs now and only on ASA      DM- 2   Compliant with meds now monitors with CGM  Does not follow diabetic diet  , A1c at 7.6  recently   Off metformin for swallowing issues and Now on glipizide  See above   Has seen EYE MD  No falls  No tingling ,numbness in feet    Independent of ADL , IADL   No depression   Lives with wife    Reports he plays video games a lot at night and

## 2025-04-29 ENCOUNTER — RESULTS FOLLOW-UP (OUTPATIENT)
Dept: INTERNAL MEDICINE CLINIC | Age: 80
End: 2025-04-29

## 2025-04-29 ENCOUNTER — TELEPHONE (OUTPATIENT)
Dept: INTERNAL MEDICINE CLINIC | Age: 80
End: 2025-04-29

## 2025-04-29 LAB
EST. AVERAGE GLUCOSE BLD GHB EST-MCNC: 119.8 MG/DL
HBA1C MFR BLD: 5.8 %

## 2025-04-29 RX ORDER — DIMETHICONE 13 MG/ML
LOTION TOPICAL
Qty: 200 EACH | Refills: 1 | Status: SHIPPED | OUTPATIENT
Start: 2025-04-29

## 2025-04-29 RX ORDER — BLOOD-GLUCOSE METER
KIT MISCELLANEOUS
Qty: 1 KIT | Refills: 0 | Status: SHIPPED | OUTPATIENT
Start: 2025-04-29

## 2025-04-29 RX ORDER — GLUCOSAMINE HCL/CHONDROITIN SU 500-400 MG
CAPSULE ORAL
Qty: 200 STRIP | Refills: 1 | Status: SHIPPED | OUTPATIENT
Start: 2025-04-29

## 2025-04-29 NOTE — TELEPHONE ENCOUNTER
----- Message from Dr. Blake Abdul MD sent at 4/29/2025  8:34 AM EDT -----  Renal liver good  Cbc with elevated wbc and touch anemia  Any signs of infection    Start Multivitamin daily     Send supplies for glucose monitoring - glucometer , test strips and lancets. Check BID

## 2025-04-29 NOTE — TELEPHONE ENCOUNTER
----- Message from Dr. Blake Abdul MD sent at 4/29/2025  1:33 PM EDT -----  Nothing to do for now unless has any fever  ----- Message -----  From: Emely Corbett MA  Sent: 4/29/2025   9:53 AM EDT  To: Blake Abdul MD    Daughter states no sign of infection. Patient has been experiencing some constipation. Please advise.    [facility]

## 2025-06-05 ENCOUNTER — OFFICE VISIT (OUTPATIENT)
Dept: INTERNAL MEDICINE CLINIC | Age: 80
End: 2025-06-05

## 2025-06-05 VITALS
DIASTOLIC BLOOD PRESSURE: 75 MMHG | HEIGHT: 65 IN | BODY MASS INDEX: 19.49 KG/M2 | SYSTOLIC BLOOD PRESSURE: 126 MMHG | HEART RATE: 65 BPM | WEIGHT: 117 LBS | RESPIRATION RATE: 18 BRPM

## 2025-06-05 DIAGNOSIS — I70.209 DIABETES MELLITUS TYPE 2 WITH ATHEROSCLEROSIS OF ARTERIES OF EXTREMITIES (HCC): ICD-10-CM

## 2025-06-05 DIAGNOSIS — I70.209 DIABETES MELLITUS TYPE 2 WITH ATHEROSCLEROSIS OF ARTERIES OF EXTREMITIES (HCC): Primary | ICD-10-CM

## 2025-06-05 DIAGNOSIS — Z86.73 HISTORY OF STROKE: ICD-10-CM

## 2025-06-05 DIAGNOSIS — I50.42 CHRONIC COMBINED SYSTOLIC AND DIASTOLIC CONGESTIVE HEART FAILURE (HCC): ICD-10-CM

## 2025-06-05 DIAGNOSIS — E78.2 MIXED HYPERLIPIDEMIA: Chronic | ICD-10-CM

## 2025-06-05 DIAGNOSIS — K22.2 ESOPHAGEAL STRICTURE: Chronic | ICD-10-CM

## 2025-06-05 DIAGNOSIS — E11.51 DIABETES MELLITUS TYPE 2 WITH ATHEROSCLEROSIS OF ARTERIES OF EXTREMITIES (HCC): ICD-10-CM

## 2025-06-05 DIAGNOSIS — Z95.1 S/P CABG X 3: ICD-10-CM

## 2025-06-05 DIAGNOSIS — E11.51 DIABETES MELLITUS TYPE 2 WITH ATHEROSCLEROSIS OF ARTERIES OF EXTREMITIES (HCC): Primary | ICD-10-CM

## 2025-06-05 DIAGNOSIS — I25.10 CORONARY ARTERY DISEASE INVOLVING NATIVE CORONARY ARTERY OF NATIVE HEART WITHOUT ANGINA PECTORIS: ICD-10-CM

## 2025-06-05 DIAGNOSIS — I10 ESSENTIAL HYPERTENSION: ICD-10-CM

## 2025-06-05 DIAGNOSIS — R63.4 WEIGHT LOSS: ICD-10-CM

## 2025-06-05 PROCEDURE — G8420 CALC BMI NORM PARAMETERS: HCPCS | Performed by: INTERNAL MEDICINE

## 2025-06-05 PROCEDURE — 3078F DIAST BP <80 MM HG: CPT | Performed by: INTERNAL MEDICINE

## 2025-06-05 PROCEDURE — 1160F RVW MEDS BY RX/DR IN RCRD: CPT | Performed by: INTERNAL MEDICINE

## 2025-06-05 PROCEDURE — 1036F TOBACCO NON-USER: CPT | Performed by: INTERNAL MEDICINE

## 2025-06-05 PROCEDURE — 1159F MED LIST DOCD IN RCRD: CPT | Performed by: INTERNAL MEDICINE

## 2025-06-05 PROCEDURE — 1123F ACP DISCUSS/DSCN MKR DOCD: CPT | Performed by: INTERNAL MEDICINE

## 2025-06-05 PROCEDURE — 3044F HG A1C LEVEL LT 7.0%: CPT | Performed by: INTERNAL MEDICINE

## 2025-06-05 PROCEDURE — 3074F SYST BP LT 130 MM HG: CPT | Performed by: INTERNAL MEDICINE

## 2025-06-05 PROCEDURE — G8427 DOCREV CUR MEDS BY ELIG CLIN: HCPCS | Performed by: INTERNAL MEDICINE

## 2025-06-05 PROCEDURE — 99213 OFFICE O/P EST LOW 20 MIN: CPT | Performed by: INTERNAL MEDICINE

## 2025-06-05 ASSESSMENT — PATIENT HEALTH QUESTIONNAIRE - PHQ9
SUM OF ALL RESPONSES TO PHQ QUESTIONS 1-9: 0
SUM OF ALL RESPONSES TO PHQ QUESTIONS 1-9: 0
2. FEELING DOWN, DEPRESSED OR HOPELESS: NOT AT ALL
1. LITTLE INTEREST OR PLEASURE IN DOING THINGS: NOT AT ALL
SUM OF ALL RESPONSES TO PHQ QUESTIONS 1-9: 0
SUM OF ALL RESPONSES TO PHQ QUESTIONS 1-9: 0

## 2025-06-05 NOTE — PROGRESS NOTES
Eh Hwang (:  1945) is a 79 y.o. male,Established patient, here for evaluation of the following chief complaint(s):  Follow-up      HPI      79 y.o. male with hx of  DM - 2,  Ischemic cardiomyopathy, s.p CABG, AICD , HTn, CVA , prostate enlargement here for regular f.w      Since last time, pt remains same as before ,   Does not do much activity per  wife - Plays video games a lot per family   no new issues of chest pain or sob but has some weight loss with poor appetite and some memory issues      Last  EGD with esophagitis and esophageal stricture s.p dilatation and placed on PPI   Still with swallowing issues per wife  and she is not sure if he takes ppi any more     Weight loss has been ongoing  but has poor oral intake despite recommendations. Drinks pop during daytime with high sugars and does not take much food per daughter and drops sugars in night time    poor appetite per  family with dysphagia issues and is a picky eater  Has hearing loss  but no memory issues    No new dizziness   No falls      Hx of CAD with ischemic CM, low EF, s.p CABG in 2016    had AICD placed in 2018  . EF slightly improved on follow up echo   Currently remains compliant with meds and fluid intake   Denies any active chest pain or sob  Remains active at home  No recent pedal edema        Admission to  [facility] 2017 for right MCA stroke ( mulitple ) with left UE , left facial weakness and dysphagia with slurred speech     Workup with MRI confirmed right MCA multiple strokes. No Afibb noted in TELE. ECHO with no thrombus  Added plavix to asa , changed to dysphagia diet   No issues since then   Has been off plavix for few yrs now and only on ASA      DM- 2   Compliant with meds now monitors with CGM  Does not follow diabetic diet  , A1c at 7.6  recently   Off metformin for swallowing issues and Now on glipizide  See above   Has seen EYE MD  No falls  No tingling ,numbness in feet    Independent of ADL , IADL   No

## 2025-06-06 DIAGNOSIS — I10 ESSENTIAL HYPERTENSION: ICD-10-CM

## 2025-06-06 DIAGNOSIS — E11.51 DIABETES MELLITUS TYPE 2 WITH ATHEROSCLEROSIS OF ARTERIES OF EXTREMITIES (HCC): ICD-10-CM

## 2025-06-06 DIAGNOSIS — I70.209 DIABETES MELLITUS TYPE 2 WITH ATHEROSCLEROSIS OF ARTERIES OF EXTREMITIES (HCC): ICD-10-CM

## 2025-06-06 LAB
CREAT UR-MCNC: 121 MG/DL (ref 39–259)
DEPRECATED RDW RBC AUTO: 13.8 % (ref 12.4–15.4)
HCT VFR BLD AUTO: 38.5 % (ref 40.5–52.5)
HGB BLD-MCNC: 12.9 G/DL (ref 13.5–17.5)
MCH RBC QN AUTO: 28.5 PG (ref 26–34)
MCHC RBC AUTO-ENTMCNC: 33.6 G/DL (ref 31–36)
MCV RBC AUTO: 85 FL (ref 80–100)
MICROALBUMIN UR DL<=1MG/L-MCNC: <1.2 MG/DL
MICROALBUMIN/CREAT UR: NORMAL MG/G (ref 0–30)
PLATELET # BLD AUTO: 243 K/UL (ref 135–450)
PMV BLD AUTO: 10.5 FL (ref 5–10.5)
RBC # BLD AUTO: 4.53 M/UL (ref 4.2–5.9)
WBC # BLD AUTO: 7.8 K/UL (ref 4–11)

## 2025-06-09 ENCOUNTER — RESULTS FOLLOW-UP (OUTPATIENT)
Dept: INTERNAL MEDICINE CLINIC | Age: 80
End: 2025-06-09

## 2025-06-09 DIAGNOSIS — K22.2 ESOPHAGEAL STRICTURE: Primary | ICD-10-CM

## 2025-06-11 ENCOUNTER — OFFICE VISIT (OUTPATIENT)
Dept: CARDIOLOGY CLINIC | Age: 80
End: 2025-06-11
Payer: MEDICARE

## 2025-06-11 ENCOUNTER — CLINICAL SUPPORT (OUTPATIENT)
Dept: CARDIOLOGY CLINIC | Age: 80
End: 2025-06-11

## 2025-06-11 VITALS
WEIGHT: 119.27 LBS | OXYGEN SATURATION: 98 % | HEART RATE: 59 BPM | DIASTOLIC BLOOD PRESSURE: 66 MMHG | SYSTOLIC BLOOD PRESSURE: 128 MMHG | BODY MASS INDEX: 19.87 KG/M2 | HEIGHT: 65 IN

## 2025-06-11 DIAGNOSIS — I42.0 DILATED CARDIOMYOPATHY (HCC): ICD-10-CM

## 2025-06-11 DIAGNOSIS — I44.7 LBBB (LEFT BUNDLE BRANCH BLOCK): Primary | ICD-10-CM

## 2025-06-11 DIAGNOSIS — E78.2 MIXED HYPERLIPIDEMIA: Chronic | ICD-10-CM

## 2025-06-11 DIAGNOSIS — Z95.810 CARDIAC RESYNCHRONIZATION THERAPY DEFIBRILLATOR (CRT-D) IN PLACE: ICD-10-CM

## 2025-06-11 DIAGNOSIS — Z95.1 S/P CABG X 3: ICD-10-CM

## 2025-06-11 DIAGNOSIS — Z86.73 HISTORY OF CVA (CEREBROVASCULAR ACCIDENT) WITHOUT RESIDUAL DEFICITS: ICD-10-CM

## 2025-06-11 DIAGNOSIS — G45.9 TIA (TRANSIENT ISCHEMIC ATTACK): ICD-10-CM

## 2025-06-11 DIAGNOSIS — I25.10 CORONARY ARTERY DISEASE INVOLVING NATIVE CORONARY ARTERY OF NATIVE HEART WITHOUT ANGINA PECTORIS: ICD-10-CM

## 2025-06-11 DIAGNOSIS — Z95.810 CARDIAC RESYNCHRONIZATION THERAPY DEFIBRILLATOR (CRT-D) IN PLACE: Primary | ICD-10-CM

## 2025-06-11 PROCEDURE — 1123F ACP DISCUSS/DSCN MKR DOCD: CPT | Performed by: INTERNAL MEDICINE

## 2025-06-11 PROCEDURE — 3078F DIAST BP <80 MM HG: CPT | Performed by: INTERNAL MEDICINE

## 2025-06-11 PROCEDURE — 99204 OFFICE O/P NEW MOD 45 MIN: CPT | Performed by: INTERNAL MEDICINE

## 2025-06-11 PROCEDURE — G8427 DOCREV CUR MEDS BY ELIG CLIN: HCPCS | Performed by: INTERNAL MEDICINE

## 2025-06-11 PROCEDURE — 1159F MED LIST DOCD IN RCRD: CPT | Performed by: INTERNAL MEDICINE

## 2025-06-11 PROCEDURE — 93000 ELECTROCARDIOGRAM COMPLETE: CPT | Performed by: INTERNAL MEDICINE

## 2025-06-11 PROCEDURE — G8420 CALC BMI NORM PARAMETERS: HCPCS | Performed by: INTERNAL MEDICINE

## 2025-06-11 PROCEDURE — 3074F SYST BP LT 130 MM HG: CPT | Performed by: INTERNAL MEDICINE

## 2025-06-11 PROCEDURE — 1036F TOBACCO NON-USER: CPT | Performed by: INTERNAL MEDICINE

## 2025-06-11 NOTE — PROGRESS NOTES
CARDIAC ELECTROPHYSIOLOGY CONSULT NOTE  Date: 6/11/25  Patient Name: Eh Hwang  YOB: 1945    Primary Care Physician: Blake Abdul MD    Referring Physician:     CHIEF COMPLAINT:   Chief Complaint   Patient presents with    New Patient    Device Check    Other     LBBB  TIA         Eh Hwang was seen today on 6/11/2025 to re establish care.    Patient is a 79 y.o. male with PMH of CAD s/p CABG x3 on 9/16/16, CHF, HLD, HTN, CM, CVA and NSVT. Who is presenting today to re establish care.     In 2016 patient had MRI that demonstrated EF of 23%. He wore a NOELLE 6/29-7/12/17, average heart rate was 79 (), NSVT. EF 9/28/17 was 20%, he was asymptomatic and he underwent BiV ICD placement 11/2/2017. He previously followed with Dr. Wheeler, last office visit 7/12/21.He followed with PCP 6/5/25 and had no cardiac complaints.      Today, 6/11/25, he states he is doing well. Reviewed device check today. Patient denies current edema, chest pain, shortness of breath, palpitations, dizziness or syncope. Patient is taking all cardiac medications as prescribed and tolerates them well.       ALLERGIES:   Allergies   Allergen Reactions    Iv Dye [Iodides] Anaphylaxis    Persantine [Dipyridamole]     Coreg [Carvedilol] Nausea And Vomiting        MEDICATIONS:   Current Outpatient Medications   Medication Sig Dispense Refill    blood glucose monitor strips USE TWICE DAILY TO TEST BLOOD SUGARS DX:E11.9 200 strip 1    glucose monitoring kit USE TWICE DAILY TO MONITOR BLOOD SUGAR READINGS DX:E11.9 1 kit 0    [employer] Lancets Original MISC USE TWICE DAILY TO TEST BLOOD SUGARS DX:E11.9 200 each 1    metoprolol succinate (TOPROL XL) 25 MG extended release tablet Take 1 tablet by mouth nightly 90 tablet 2    pantoprazole (PROTONIX) 40 MG tablet Take 1 tablet by mouth daily 90 tablet 1    glipiZIDE (GLUCOTROL XL) 2.5 MG extended release tablet Take 1 tablet by mouth daily 90 tablet 0    Continuous Glucose

## 2025-06-11 NOTE — PATIENT INSTRUCTIONS
PLAN:   -Discussed procedure for battery replacement when needed.   - No medication changes today.   - Continue with device checks as scheduled.   - Follow up with me in 1 year.

## 2025-06-15 DIAGNOSIS — I70.209 DIABETES MELLITUS TYPE 2 WITH ATHEROSCLEROSIS OF ARTERIES OF EXTREMITIES (HCC): ICD-10-CM

## 2025-06-15 DIAGNOSIS — E11.51 DIABETES MELLITUS TYPE 2 WITH ATHEROSCLEROSIS OF ARTERIES OF EXTREMITIES (HCC): ICD-10-CM

## 2025-06-16 RX ORDER — ACYCLOVIR 400 MG/1
TABLET ORAL
Qty: 9 EACH | Refills: 0 | Status: SHIPPED | OUTPATIENT
Start: 2025-06-16

## 2025-07-02 ENCOUNTER — TELEPHONE (OUTPATIENT)
Dept: INTERNAL MEDICINE CLINIC | Age: 80
End: 2025-07-02

## 2025-07-02 NOTE — TELEPHONE ENCOUNTER
----- Message from Dr. Blake Abdul MD sent at 7/1/2025  6:27 PM EDT -----  No need  ----- Message -----  From: Dora Murray  Sent: 7/1/2025   2:27 PM EDT  To: Blake Abdul MD    Should patient schedule an appointment?  ----- Message -----  From: Blake Abdul MD  Sent: 7/1/2025   1:43 PM EDT  To: Dora Murray    Not sure   Likely hearing issue  ----- Message -----  From: Dora Murray  Sent: 7/1/2025  10:26 AM EDT  To: Blake Abdul MD    This is Sarah Navarro (daughter)  Dad called me in the middle of the night saying he has a very loud ringing/buzzing sound in his ear and wanted me to reach out to the dr for his opinion on what it could be from or what to do about it.   I told him I could make him an appt to get checked but he said to just send a message to Dr MELISSA nunez.

## 2025-07-22 ENCOUNTER — APPOINTMENT (OUTPATIENT)
Age: 80
End: 2025-07-22
Payer: MEDICARE

## 2025-07-22 ENCOUNTER — HOSPITAL ENCOUNTER (OUTPATIENT)
Age: 80
Setting detail: OBSERVATION
Discharge: HOME | End: 2025-07-23
Attending: EMERGENCY MEDICINE | Admitting: INTERNAL MEDICINE
Payer: MEDICARE

## 2025-07-22 DIAGNOSIS — R63.4 WEIGHT LOSS: ICD-10-CM

## 2025-07-22 DIAGNOSIS — R13.10 DYSPHAGIA, UNSPECIFIED TYPE: ICD-10-CM

## 2025-07-22 DIAGNOSIS — R07.89 ATYPICAL CHEST PAIN: ICD-10-CM

## 2025-07-22 DIAGNOSIS — I42.9 CARDIOMYOPATHY, UNSPECIFIED TYPE (HCC): ICD-10-CM

## 2025-07-22 DIAGNOSIS — R07.9 CHEST PAIN, UNSPECIFIED TYPE: Primary | ICD-10-CM

## 2025-07-22 DIAGNOSIS — R06.09 DYSPNEA ON EXERTION: ICD-10-CM

## 2025-07-22 LAB
ALBUMIN SERPL-MCNC: 4.6 G/DL (ref 3.4–5)
ALBUMIN/GLOB SERPL: 1.4 {RATIO}
ALP SERPL-CCNC: 75 U/L (ref 40–129)
ALT SERPL-CCNC: 6 U/L (ref 10–40)
ANION GAP SERPL CALCULATED.3IONS-SCNC: 15 MMOL/L (ref 3–16)
AST SERPL-CCNC: 19 U/L (ref 15–37)
BASOPHILS # BLD: 0.04 K/UL (ref 0–0.2)
BASOPHILS NFR BLD: 0 %
BILIRUB SERPL-MCNC: 0.4 MG/DL (ref 0–1)
BILIRUB UR QL STRIP: NEGATIVE
BNP SERPL-MCNC: 245 PG/ML (ref 0–449)
BUN SERPL-MCNC: 13 MG/DL (ref 7–20)
CALCIUM SERPL-MCNC: 10.2 MG/DL (ref 8.3–10.6)
CASTS #/AREA URNS LPF: ABNORMAL /LPF
CASTS #/AREA URNS LPF: ABNORMAL /LPF
CHARACTER UR: ABNORMAL
CHLORIDE SERPL-SCNC: 99 MMOL/L (ref 99–110)
CLARITY UR: CLEAR
CO2 SERPL-SCNC: 26 MMOL/L (ref 21–32)
COLOR UR: YELLOW
CREAT SERPL-MCNC: 1.3 MG/DL (ref 0.8–1.3)
D DIMER PPP FEU-MCNC: 0.39 UG/ML FEU (ref 0–0.6)
DATE LAST DOSE: ABNORMAL
DIGOXIN DOSE TIME: ABNORMAL
DIGOXIN DOSE: ABNORMAL MG
DIGOXIN SERPL-MCNC: 0.4 NG/ML (ref 0.8–2)
EKG ATRIAL RATE: 94 BPM
EKG DIAGNOSIS: NORMAL
EKG P AXIS: 88 DEGREES
EKG P-R INTERVAL: 164 MS
EKG Q-T INTERVAL: 382 MS
EKG QRS DURATION: 134 MS
EKG QTC CALCULATION (BAZETT): 477 MS
EKG R AXIS: 124 DEGREES
EKG T AXIS: -37 DEGREES
EKG VENTRICULAR RATE: 94 BPM
EOSINOPHIL # BLD: 0.14 K/UL (ref 0–0.6)
EOSINOPHILS RELATIVE PERCENT: 1 %
EPI CELLS #/AREA URNS HPF: ABNORMAL /HPF
ERYTHROCYTE [DISTWIDTH] IN BLOOD BY AUTOMATED COUNT: 13 % (ref 12.4–15.4)
GFR, ESTIMATED: 55 ML/MIN/1.73M2
GLUCOSE BLD-MCNC: 111 MG/DL (ref 70–99)
GLUCOSE BLD-MCNC: 112 MG/DL (ref 70–99)
GLUCOSE SERPL-MCNC: 192 MG/DL (ref 70–99)
GLUCOSE UR STRIP-MCNC: NEGATIVE MG/DL
HCT VFR BLD AUTO: 43.4 % (ref 40.5–52.5)
HGB BLD-MCNC: 14.1 G/DL (ref 13.5–17.5)
HGB UR QL STRIP.AUTO: NEGATIVE
IMM GRANULOCYTES # BLD AUTO: 0.07 K/UL (ref 0–0.5)
IMM GRANULOCYTES NFR BLD: 1 %
KETONES UR STRIP-MCNC: NEGATIVE MG/DL
LEUKOCYTE ESTERASE UR QL STRIP: NEGATIVE
LYMPHOCYTES NFR BLD: 1.38 K/UL (ref 1–5.1)
LYMPHOCYTES RELATIVE PERCENT: 12 %
MCH RBC QN AUTO: 27.8 PG (ref 26–34)
MCHC RBC AUTO-ENTMCNC: 32.5 G/DL (ref 31–36)
MCV RBC AUTO: 85.4 FL (ref 80–100)
MONOCYTES NFR BLD: 0.73 K/UL (ref 0–1.3)
MONOCYTES NFR BLD: 6 %
MUCOUS THREADS URNS QL MICRO: PRESENT
NEUTROPHILS NFR BLD: 80 %
NEUTS SEG NFR BLD: 9.13 K/UL (ref 1.7–7.7)
NITRITE UR QL STRIP: NEGATIVE
PH UR STRIP: 6 [PH] (ref 5–8)
PLATELET # BLD AUTO: 312 K/UL (ref 135–450)
PMV BLD AUTO: 9.8 FL (ref 9.4–12.4)
POTASSIUM SERPL-SCNC: 4.3 MMOL/L (ref 3.5–5.1)
PROT SERPL-MCNC: 8 G/DL (ref 6.4–8.2)
PROT UR STRIP-MCNC: ABNORMAL MG/DL
RBC # BLD AUTO: 5.08 M/UL (ref 4.2–5.9)
RBC #/AREA URNS HPF: ABNORMAL /HPF
SODIUM SERPL-SCNC: 140 MMOL/L (ref 136–145)
SP GR UR STRIP: 1.02 (ref 1–1.03)
TROPONIN I SERPL HS-MCNC: 18 NG/L (ref 0–22)
TROPONIN I SERPL HS-MCNC: 19 NG/L (ref 0–22)
TROPONIN I SERPL HS-MCNC: 20 NG/L (ref 0–22)
TROPONIN I SERPL HS-MCNC: 21 NG/L (ref 0–22)
UROBILINOGEN UR STRIP-ACNC: 1 EU/DL (ref 0–1)
WBC #/AREA URNS HPF: ABNORMAL /HPF
WBC OTHER # BLD: 11.5 K/UL (ref 4–11)

## 2025-07-22 PROCEDURE — 2580000003 HC RX 258: Performed by: INTERNAL MEDICINE

## 2025-07-22 PROCEDURE — 6370000000 HC RX 637 (ALT 250 FOR IP): Performed by: INTERNAL MEDICINE

## 2025-07-22 PROCEDURE — 81001 URINALYSIS AUTO W/SCOPE: CPT

## 2025-07-22 PROCEDURE — 96372 THER/PROPH/DIAG INJ SC/IM: CPT

## 2025-07-22 PROCEDURE — 99285 EMERGENCY DEPT VISIT HI MDM: CPT

## 2025-07-22 PROCEDURE — 83880 ASSAY OF NATRIURETIC PEPTIDE: CPT

## 2025-07-22 PROCEDURE — 93297 REM INTERROG DEV EVAL ICPMS: CPT | Performed by: INTERNAL MEDICINE

## 2025-07-22 PROCEDURE — 71046 X-RAY EXAM CHEST 2 VIEWS: CPT

## 2025-07-22 PROCEDURE — 82962 GLUCOSE BLOOD TEST: CPT

## 2025-07-22 PROCEDURE — 6360000002 HC RX W HCPCS: Performed by: INTERNAL MEDICINE

## 2025-07-22 PROCEDURE — 93010 ELECTROCARDIOGRAM REPORT: CPT | Performed by: INTERNAL MEDICINE

## 2025-07-22 PROCEDURE — 80162 ASSAY OF DIGOXIN TOTAL: CPT

## 2025-07-22 PROCEDURE — 93005 ELECTROCARDIOGRAM TRACING: CPT | Performed by: EMERGENCY MEDICINE

## 2025-07-22 PROCEDURE — 80053 COMPREHEN METABOLIC PANEL: CPT

## 2025-07-22 PROCEDURE — 2500000003 HC RX 250 WO HCPCS: Performed by: INTERNAL MEDICINE

## 2025-07-22 PROCEDURE — 85025 COMPLETE CBC W/AUTO DIFF WBC: CPT

## 2025-07-22 PROCEDURE — 85379 FIBRIN DEGRADATION QUANT: CPT

## 2025-07-22 PROCEDURE — 99222 1ST HOSP IP/OBS MODERATE 55: CPT | Performed by: INTERNAL MEDICINE

## 2025-07-22 PROCEDURE — 6370000000 HC RX 637 (ALT 250 FOR IP): Performed by: EMERGENCY MEDICINE

## 2025-07-22 PROCEDURE — 36415 COLL VENOUS BLD VENIPUNCTURE: CPT

## 2025-07-22 PROCEDURE — 84484 ASSAY OF TROPONIN QUANT: CPT

## 2025-07-22 PROCEDURE — 96360 HYDRATION IV INFUSION INIT: CPT

## 2025-07-22 PROCEDURE — G0378 HOSPITAL OBSERVATION PER HR: HCPCS

## 2025-07-22 PROCEDURE — 96361 HYDRATE IV INFUSION ADD-ON: CPT

## 2025-07-22 RX ORDER — ACETAMINOPHEN 325 MG/1
650 TABLET ORAL EVERY 6 HOURS PRN
Status: DISCONTINUED | OUTPATIENT
Start: 2025-07-22 | End: 2025-07-23 | Stop reason: HOSPADM

## 2025-07-22 RX ORDER — ASPIRIN 81 MG/1
81 TABLET ORAL DAILY
Status: DISCONTINUED | OUTPATIENT
Start: 2025-07-23 | End: 2025-07-23 | Stop reason: HOSPADM

## 2025-07-22 RX ORDER — POTASSIUM CHLORIDE 7.45 MG/ML
10 INJECTION INTRAVENOUS PRN
Status: DISCONTINUED | OUTPATIENT
Start: 2025-07-22 | End: 2025-07-23 | Stop reason: HOSPADM

## 2025-07-22 RX ORDER — ACETAMINOPHEN 650 MG/1
650 SUPPOSITORY RECTAL EVERY 6 HOURS PRN
Status: DISCONTINUED | OUTPATIENT
Start: 2025-07-22 | End: 2025-07-23 | Stop reason: HOSPADM

## 2025-07-22 RX ORDER — SODIUM CHLORIDE 9 MG/ML
INJECTION, SOLUTION INTRAVENOUS CONTINUOUS
Status: DISCONTINUED | OUTPATIENT
Start: 2025-07-22 | End: 2025-07-23

## 2025-07-22 RX ORDER — MAGNESIUM SULFATE IN WATER 40 MG/ML
2000 INJECTION, SOLUTION INTRAVENOUS PRN
Status: DISCONTINUED | OUTPATIENT
Start: 2025-07-22 | End: 2025-07-23 | Stop reason: HOSPADM

## 2025-07-22 RX ORDER — PANTOPRAZOLE SODIUM 40 MG/1
40 TABLET, DELAYED RELEASE ORAL DAILY
Status: DISCONTINUED | OUTPATIENT
Start: 2025-07-22 | End: 2025-07-23

## 2025-07-22 RX ORDER — SODIUM CHLORIDE 9 MG/ML
INJECTION, SOLUTION INTRAVENOUS PRN
Status: DISCONTINUED | OUTPATIENT
Start: 2025-07-22 | End: 2025-07-23 | Stop reason: HOSPADM

## 2025-07-22 RX ORDER — GLUCAGON 1 MG/ML
1 KIT INJECTION PRN
Status: DISCONTINUED | OUTPATIENT
Start: 2025-07-22 | End: 2025-07-23 | Stop reason: HOSPADM

## 2025-07-22 RX ORDER — LISINOPRIL 5 MG/1
5 TABLET ORAL DAILY
Status: DISCONTINUED | OUTPATIENT
Start: 2025-07-22 | End: 2025-07-23 | Stop reason: HOSPADM

## 2025-07-22 RX ORDER — ENOXAPARIN SODIUM 100 MG/ML
40 INJECTION SUBCUTANEOUS DAILY
Status: DISCONTINUED | OUTPATIENT
Start: 2025-07-22 | End: 2025-07-23 | Stop reason: HOSPADM

## 2025-07-22 RX ORDER — INSULIN LISPRO 100 [IU]/ML
0-8 INJECTION, SOLUTION INTRAVENOUS; SUBCUTANEOUS
Status: DISCONTINUED | OUTPATIENT
Start: 2025-07-22 | End: 2025-07-23 | Stop reason: HOSPADM

## 2025-07-22 RX ORDER — ONDANSETRON 4 MG/1
4 TABLET, ORALLY DISINTEGRATING ORAL EVERY 8 HOURS PRN
Status: DISCONTINUED | OUTPATIENT
Start: 2025-07-22 | End: 2025-07-23 | Stop reason: HOSPADM

## 2025-07-22 RX ORDER — SODIUM CHLORIDE 0.9 % (FLUSH) 0.9 %
5-40 SYRINGE (ML) INJECTION EVERY 12 HOURS SCHEDULED
Status: DISCONTINUED | OUTPATIENT
Start: 2025-07-22 | End: 2025-07-23 | Stop reason: HOSPADM

## 2025-07-22 RX ORDER — ONDANSETRON 2 MG/ML
4 INJECTION INTRAMUSCULAR; INTRAVENOUS EVERY 6 HOURS PRN
Status: DISCONTINUED | OUTPATIENT
Start: 2025-07-22 | End: 2025-07-23 | Stop reason: HOSPADM

## 2025-07-22 RX ORDER — FINASTERIDE 5 MG/1
5 TABLET, FILM COATED ORAL DAILY
Status: DISCONTINUED | OUTPATIENT
Start: 2025-07-22 | End: 2025-07-23 | Stop reason: HOSPADM

## 2025-07-22 RX ORDER — ATORVASTATIN CALCIUM 40 MG/1
40 TABLET, FILM COATED ORAL NIGHTLY
Status: DISCONTINUED | OUTPATIENT
Start: 2025-07-22 | End: 2025-07-23 | Stop reason: HOSPADM

## 2025-07-22 RX ORDER — LISINOPRIL 5 MG/1
5 TABLET ORAL DAILY
COMMUNITY

## 2025-07-22 RX ORDER — DEXTROSE MONOHYDRATE 100 MG/ML
INJECTION, SOLUTION INTRAVENOUS CONTINUOUS PRN
Status: DISCONTINUED | OUTPATIENT
Start: 2025-07-22 | End: 2025-07-23 | Stop reason: HOSPADM

## 2025-07-22 RX ORDER — ASPIRIN 81 MG/1
324 TABLET, CHEWABLE ORAL ONCE
Status: COMPLETED | OUTPATIENT
Start: 2025-07-22 | End: 2025-07-22

## 2025-07-22 RX ORDER — POTASSIUM CHLORIDE 1500 MG/1
40 TABLET, EXTENDED RELEASE ORAL PRN
Status: DISCONTINUED | OUTPATIENT
Start: 2025-07-22 | End: 2025-07-23 | Stop reason: HOSPADM

## 2025-07-22 RX ORDER — POLYETHYLENE GLYCOL 3350 17 G/17G
17 POWDER, FOR SOLUTION ORAL DAILY PRN
Status: DISCONTINUED | OUTPATIENT
Start: 2025-07-22 | End: 2025-07-23 | Stop reason: HOSPADM

## 2025-07-22 RX ORDER — SODIUM CHLORIDE 0.9 % (FLUSH) 0.9 %
5-40 SYRINGE (ML) INJECTION PRN
Status: DISCONTINUED | OUTPATIENT
Start: 2025-07-22 | End: 2025-07-23 | Stop reason: HOSPADM

## 2025-07-22 RX ORDER — METOPROLOL SUCCINATE 25 MG/1
25 TABLET, EXTENDED RELEASE ORAL NIGHTLY
Status: DISCONTINUED | OUTPATIENT
Start: 2025-07-22 | End: 2025-07-23 | Stop reason: HOSPADM

## 2025-07-22 RX ADMIN — METOPROLOL SUCCINATE 25 MG: 25 TABLET, EXTENDED RELEASE ORAL at 20:30

## 2025-07-22 RX ADMIN — FINASTERIDE 5 MG: 5 TABLET, FILM COATED ORAL at 15:08

## 2025-07-22 RX ADMIN — Medication 10 ML: at 20:08

## 2025-07-22 RX ADMIN — ENOXAPARIN SODIUM 40 MG: 100 INJECTION SUBCUTANEOUS at 15:08

## 2025-07-22 RX ADMIN — ATORVASTATIN CALCIUM 40 MG: 40 TABLET, FILM COATED ORAL at 20:30

## 2025-07-22 RX ADMIN — ASPIRIN 324 MG: 81 TABLET, CHEWABLE ORAL at 12:40

## 2025-07-22 RX ADMIN — SODIUM CHLORIDE: 0.9 INJECTION, SOLUTION INTRAVENOUS at 15:24

## 2025-07-22 RX ADMIN — PANTOPRAZOLE SODIUM 40 MG: 40 TABLET, DELAYED RELEASE ORAL at 15:08

## 2025-07-22 RX ADMIN — LISINOPRIL 5 MG: 5 TABLET ORAL at 15:08

## 2025-07-22 ASSESSMENT — PAIN SCALES - GENERAL
PAINLEVEL_OUTOF10: 0
PAINLEVEL_OUTOF10: 4
PAINLEVEL_OUTOF10: 0

## 2025-07-22 ASSESSMENT — HEART SCORE: ECG: NON-SPECIFC REPOLARIZATION DISTURBANCE/LBTB/PM

## 2025-07-22 ASSESSMENT — PAIN DESCRIPTION - DESCRIPTORS: DESCRIPTORS: PRESSURE

## 2025-07-22 ASSESSMENT — PAIN - FUNCTIONAL ASSESSMENT: PAIN_FUNCTIONAL_ASSESSMENT: 0-10

## 2025-07-22 ASSESSMENT — PAIN DESCRIPTION - LOCATION: LOCATION: CHEST

## 2025-07-22 ASSESSMENT — PAIN SCALES - WONG BAKER: WONGBAKER_NUMERICALRESPONSE: NO HURT

## 2025-07-22 NOTE — ED PROVIDER NOTES
[facility] EMERGENCY DEPARTMENT  EMERGENCY DEPARTMENT ENCOUNTER      Pt Name: Eh Hwang  MRN: [id number]  Birthdate 1945  Date of evaluation: 7/22/2025  Provider: TOYIN LIU DO    CHIEF COMPLAINT       Chief Complaint   Patient presents with    Chest Pain     Chest tightness over past couple of weeks         HISTORY OF PRESENT ILLNESS   (Location/Symptom, Timing/Onset, Context/Setting, Quality, Duration, Modifying Factors, Severity)  Note limiting factors.   Eh Hwang is a 79 y.o. male who presents to the emergency department with a complaint of chest tightness that began 2 weeks ago.  He states that it only occurs when he gets up and walks around.  It is relieved with rest.  He denies any shortness of breath but family reports that he has been getting more winded with walking which improves with rest.  He denies any associated diaphoresis.    He does admit to a slight nonproductive cough for the last few weeks.  No sputum.  No fever or chills.  He denies any current chest pain at the time of exam.    He does have a defibrillator/pacemaker.  He denies receiving any defibrillator shocks.  No syncope or palpitations    Family is at the bedside.  His daughter reports that they are concerned because he is lost approximately 15 pounds in the last 2 months.  He does have a history of esophageal stricture and food occasionally sticks in his esophagus.  He denies any current esophageal obstruction or discomfort.  He is scheduled to have his esophagus dilated again in August.    The patient states that food just does not taste good and he has no appetite.  Family reports that he takes a few bites of food only at a time.  He reports normal urine output.  He denies any dysuria hematuria frequency or urgency.  He denies any melena or hematochezia.  He denies any abdominal pain back pain or flank pain.    His daughter is also concerned that he has not had any type of cardiac workup in the

## 2025-07-22 NOTE — ED NOTES
ED to Inpatient Handoff SBAR    Patient Name: Eh Hwang   :  1945  79 y.o.   MRN:  [id number]  Preferred Name    ED Room #:    Family/Caregiver Present no     Chief Complaint Chest Pain (Chest tightness over past couple of weeks)       Restraints no   Sitter no   Sepsis Risk Score   Isolation No active isolations   Fall Risk Assessment Presents to emergency department  because of falls (Syncope, seizure, or loss of consciousness): No, Age > 70: Yes, Altered Mental Status, Intoxication with alcohol or substance confusion (Disorientation, impaired judgment, poor safety awaremess, or inability to follow instructions): No, Impaired Mobility: Ambulates or transfers with assistive devices or assistance; Unable to ambulate or transer.: No, Nursing Judgement: No     Situation  Code Status: Prior No additional code details.    Allergies: Iv dye [iodides], Persantine [dipyridamole], and Coreg [carvedilol]  Weight: Patient Vitals for the past 96 hrs (Last 3 readings):   Weight   25 1212 50.6 kg (111 lb 8 oz)     Arrived from: home  Hospital Problem/Diagnosis:  Principal Problem:    Chest pain  Resolved Problems:    * No resolved hospital problems. *    Imaging:   XR CHEST (2 VW)   Final Result      No acute findings are seen.      Electronically signed by Familia Brown        Abnormal labs:   Abnormal Labs Reviewed   CBC WITH AUTO DIFFERENTIAL - Abnormal; Notable for the following components:       Result Value    WBC 11.5 (*)     Immature Granulocytes % 1 (*)     Neutrophils Absolute 9.13 (*)     All other components within normal limits   COMPREHENSIVE METABOLIC PANEL W/ REFLEX TO MG FOR LOW K - Abnormal; Notable for the following components:    Glucose 192 (*)     Est, Glom Filt Rate 55 (*)     ALT 6 (*)     All other components within normal limits   DIGOXIN LEVEL - Abnormal; Notable for the following components:    Digoxin Lvl 0.4 (*)     All other components within normal limits   URINALYSIS WITH

## 2025-07-22 NOTE — CONSULTS
CARDIOLOGY CONSULTATION        Patient Name: Eh Hwang  Date of admission: 7/22/2025 12:04 PM  Admission Dx: Chest pain [R07.9]  Dyspnea on exertion [R06.09]  Weight loss [R63.4]  Chest pain, unspecified type [R07.9]  Requesting Physician: Kyle Sigala MD  Primary Care physician: Blake Abdul MD    Reason for Consultation/Chief Complaint: chest pain     History of Present Illness:     Eh Hwang is a 79 y.o. patient with Pmh of CAD s/p CABG, ischemic cardiomyopathy, BiV ICD, Htn, Hld, CVA, esophageal strictures. Patient presented to the hospital with complaints of chest tightness on exertion for 2 weeks, family concerned of weight loss (approx 15 lbs / 2 months).  He has not had any cardiac work up recently, therefore, cardiology is consulted for further evaluation.     Patient resting in bed reports he is just not feeling well. His wife is also admitted to another unit currently, he thinks she got him sick with cough and just feeling weak. He denies any chest pain.       Past Medical History:   has a past medical history of Abnormal echocardiogram, Abnormal stress test, CAD (coronary artery disease), Cardiomyopathy (HCC), CHF (congestive heart failure) (HCC), Diabetes (HCC), Hyperlipidemia, Hypertension, and Weight loss.    Surgical History:   has a past surgical history that includes Neck surgery; Colonoscopy (07/16/2016); Coronary artery bypass graft (09/16/2016); Upper gastrointestinal endoscopy (05/22/2017); and Cystoscopy (03/2018).     Social History:   reports that he has never smoked. He has never used smokeless tobacco. He reports that he does not drink alcohol and does not use drugs.     Family History:  family history includes Cancer in his brother and father; Diabetes in his brother, father, and sister; Heart Disease in his father; High Blood Pressure in his father.      Home Medications:  Were reviewed and are listed in nursing record and/or below  Prior to Admission

## 2025-07-22 NOTE — H&P
History and Physical      Name:  Eh Hwang /Age/Sex: 1945  (79 y.o. male)   MRN & CSN:  [id number] & [id number] Admission Date/Time: 2025 12:04 PM   Location:  3205/3205-01 PCP: Blake Abdul MD       Hospital Day: 1    This note was generated using [facility] ([employer]) software. If any parties were recorded to generate this note outside of the software user, their consent was obtained prior to recording their voices. All portions of this note were verified by the clinician prior to inclusion in the medical record.    Impression   Eh Hwang is a 79 y.o.  male with CAD s/p CABG, ischemic cardiomyopathy s/p AICD, and hypertension, presented with chest tightness and 15-pound weight loss over 2 months. He reported exertional chest pain (2-3/10), dysphagia, poor appetite, and weakness. Initial troponin was negative with paced rhythm on ECG. He was admitted for chest pain workup with plans for cardiology consultation, echocardiogram, GI consultation for dysphagia, and nutritional assessment for weight loss.    Assessment   # Chest pain, with typical and atypical features. Chest pain is exertional, might be musculoskeletal related to pacemaker versus anginal. Troponin negative x 2.  EKG with no acute changes, paced rhythm.  # Dysphagia, appears to be both oropharyngeal and esophageal. Patient has history of esophageal strictures requiring stretching in the past.  # Weight loss. More than 15 pounds over the last 2 months. Secondary to poor oral intake.  # Severe malnutrition, due to low caloric intake, Body mass index is 18.55 kg/m².  # Physical deconditioning.  # Mild renal insufficiency, creatinine 1.3    Comorbidities:   # CAD status post CABG on aspirin  # Ischemic cardiomyopathy, low EF  # Left bundle branch block  # NSVT status post AICD  # Essential hypertension on lisinopril and metoprolol  # History of CVA without residual deficit  # Hyperlipidemia on Lipitor  # Diabetes

## 2025-07-23 ENCOUNTER — ANESTHESIA (OUTPATIENT)
Age: 80
End: 2025-07-23
Payer: MEDICARE

## 2025-07-23 ENCOUNTER — ANESTHESIA EVENT (OUTPATIENT)
Age: 80
End: 2025-07-23
Payer: MEDICARE

## 2025-07-23 ENCOUNTER — APPOINTMENT (OUTPATIENT)
Age: 80
End: 2025-07-23
Attending: INTERNAL MEDICINE
Payer: MEDICARE

## 2025-07-23 VITALS
OXYGEN SATURATION: 99 % | HEIGHT: 65 IN | RESPIRATION RATE: 12 BRPM | BODY MASS INDEX: 18.99 KG/M2 | WEIGHT: 114 LBS | TEMPERATURE: 98.1 F | DIASTOLIC BLOOD PRESSURE: 54 MMHG | HEART RATE: 63 BPM | SYSTOLIC BLOOD PRESSURE: 130 MMHG

## 2025-07-23 PROBLEM — E43 SEVERE MALNUTRITION: Chronic | Status: ACTIVE | Noted: 2025-07-23

## 2025-07-23 LAB
ANION GAP SERPL CALCULATED.3IONS-SCNC: 9 MMOL/L (ref 3–16)
BUN SERPL-MCNC: 11 MG/DL (ref 7–20)
CALCIUM SERPL-MCNC: 9 MG/DL (ref 8.3–10.6)
CHLORIDE SERPL-SCNC: 104 MMOL/L (ref 99–110)
CO2 SERPL-SCNC: 25 MMOL/L (ref 21–32)
CREAT SERPL-MCNC: 1.2 MG/DL (ref 0.8–1.3)
ECHO AO ROOT DIAM: 3.9 CM
ECHO AO ROOT INDEX: 2.5 CM/M2
ECHO AV AREA PEAK VELOCITY: 2.6 CM2
ECHO AV AREA VTI: 2.7 CM2
ECHO AV AREA/BSA PEAK VELOCITY: 1.7 CM2/M2
ECHO AV AREA/BSA VTI: 1.7 CM2/M2
ECHO AV MEAN GRADIENT: 1 MMHG
ECHO AV MEAN VELOCITY: 0.5 M/S
ECHO AV PEAK GRADIENT: 4 MMHG
ECHO AV PEAK VELOCITY: 1 M/S
ECHO AV VELOCITY RATIO: 0.8
ECHO AV VTI: 18 CM
ECHO BSA: 1.54 M2
ECHO EST RA PRESSURE: 3 MMHG
ECHO LV E' LATERAL VELOCITY: 3.25 CM/S
ECHO LV E' SEPTAL VELOCITY: 4.5 CM/S
ECHO LV EF PHYSICIAN: 45 %
ECHO LVOT AREA: 3.1 CM2
ECHO LVOT AV VTI INDEX: 0.87
ECHO LVOT DIAM: 2 CM
ECHO LVOT MEAN GRADIENT: 1 MMHG
ECHO LVOT PEAK GRADIENT: 3 MMHG
ECHO LVOT PEAK VELOCITY: 0.8 M/S
ECHO LVOT STROKE VOLUME INDEX: 31.6 ML/M2
ECHO LVOT SV: 49.3 ML
ECHO LVOT VTI: 15.7 CM
ECHO MV A VELOCITY: 1.21 M/S
ECHO MV AREA VTI: 1.2 CM2
ECHO MV E DECELERATION TIME (DT): 359 MS
ECHO MV E VELOCITY: 0.81 M/S
ECHO MV E/A RATIO: 0.67
ECHO MV E/E' LATERAL: 24.92
ECHO MV E/E' RATIO (AVERAGED): 21.46
ECHO MV E/E' SEPTAL: 18
ECHO MV LVOT VTI INDEX: 2.71
ECHO MV MAX VELOCITY: 1.3 M/S
ECHO MV MEAN GRADIENT: 2 MMHG
ECHO MV MEAN VELOCITY: 0.6 M/S
ECHO MV PEAK GRADIENT: 7 MMHG
ECHO MV VTI: 42.5 CM
ECHO PVEIN A DURATION: 127 MS
ECHO PVEIN A VELOCITY: 0.2 M/S
ECHO PVEIN PEAK D VELOCITY: 0.3 M/S
ECHO PVEIN PEAK S VELOCITY: 0.4 M/S
ECHO PVEIN S/D RATIO: 1.3 NO UNITS
ECHO RV FREE WALL PEAK S': 9.1 CM/S
ECHO RV TAPSE: 1.3 CM (ref 1.7–?)
ERYTHROCYTE [DISTWIDTH] IN BLOOD BY AUTOMATED COUNT: 13.1 % (ref 12.4–15.4)
EST. AVERAGE GLUCOSE BLD GHB EST-MCNC: 131 MG/DL
GFR, ESTIMATED: 63 ML/MIN/1.73M2
GLUCOSE BLD-MCNC: 111 MG/DL (ref 70–99)
GLUCOSE BLD-MCNC: 118 MG/DL (ref 70–99)
GLUCOSE BLD-MCNC: 99 MG/DL (ref 70–99)
GLUCOSE SERPL-MCNC: 106 MG/DL (ref 70–99)
HBA1C MFR BLD: 6.2 %
HCT VFR BLD AUTO: 37.6 % (ref 40.5–52.5)
HGB BLD-MCNC: 12.1 G/DL (ref 13.5–17.5)
MCH RBC QN AUTO: 28 PG (ref 26–34)
MCHC RBC AUTO-ENTMCNC: 32.2 G/DL (ref 31–36)
MCV RBC AUTO: 87 FL (ref 80–100)
PLATELET # BLD AUTO: 242 K/UL (ref 135–450)
PMV BLD AUTO: 10 FL
POTASSIUM SERPL-SCNC: 4.9 MMOL/L (ref 3.5–5.1)
RBC # BLD AUTO: 4.32 M/UL (ref 4.2–5.9)
SODIUM SERPL-SCNC: 138 MMOL/L (ref 136–145)
WBC OTHER # BLD: 9 K/UL (ref 4–11)

## 2025-07-23 PROCEDURE — 99232 SBSQ HOSP IP/OBS MODERATE 35: CPT | Performed by: INTERNAL MEDICINE

## 2025-07-23 PROCEDURE — 93306 TTE W/DOPPLER COMPLETE: CPT | Performed by: INTERNAL MEDICINE

## 2025-07-23 PROCEDURE — 85027 COMPLETE CBC AUTOMATED: CPT

## 2025-07-23 PROCEDURE — 80048 BASIC METABOLIC PNL TOTAL CA: CPT

## 2025-07-23 PROCEDURE — G0378 HOSPITAL OBSERVATION PER HR: HCPCS

## 2025-07-23 PROCEDURE — 2709999900 HC NON-CHARGEABLE SUPPLY: Performed by: INTERNAL MEDICINE

## 2025-07-23 PROCEDURE — 96372 THER/PROPH/DIAG INJ SC/IM: CPT

## 2025-07-23 PROCEDURE — 3609015900 HC ESOPHAGOSCOPY DILATION BALLOON: Performed by: INTERNAL MEDICINE

## 2025-07-23 PROCEDURE — 36415 COLL VENOUS BLD VENIPUNCTURE: CPT

## 2025-07-23 PROCEDURE — 7100000011 HC PHASE II RECOVERY - ADDTL 15 MIN: Performed by: INTERNAL MEDICINE

## 2025-07-23 PROCEDURE — 6360000002 HC RX W HCPCS: Performed by: NURSE ANESTHETIST, CERTIFIED REGISTERED

## 2025-07-23 PROCEDURE — 6370000000 HC RX 637 (ALT 250 FOR IP): Performed by: INTERNAL MEDICINE

## 2025-07-23 PROCEDURE — 3700000000 HC ANESTHESIA ATTENDED CARE: Performed by: INTERNAL MEDICINE

## 2025-07-23 PROCEDURE — 92610 EVALUATE SWALLOWING FUNCTION: CPT

## 2025-07-23 PROCEDURE — 7100000010 HC PHASE II RECOVERY - FIRST 15 MIN: Performed by: INTERNAL MEDICINE

## 2025-07-23 PROCEDURE — 82962 GLUCOSE BLOOD TEST: CPT

## 2025-07-23 PROCEDURE — 88305 TISSUE EXAM BY PATHOLOGIST: CPT

## 2025-07-23 PROCEDURE — 6360000004 HC RX CONTRAST MEDICATION: Performed by: INTERNAL MEDICINE

## 2025-07-23 PROCEDURE — C8929 TTE W OR WO FOL WCON,DOPPLER: HCPCS

## 2025-07-23 PROCEDURE — 83036 HEMOGLOBIN GLYCOSYLATED A1C: CPT

## 2025-07-23 PROCEDURE — 96361 HYDRATE IV INFUSION ADD-ON: CPT

## 2025-07-23 PROCEDURE — 97165 OT EVAL LOW COMPLEX 30 MIN: CPT

## 2025-07-23 PROCEDURE — 2580000003 HC RX 258: Performed by: INTERNAL MEDICINE

## 2025-07-23 PROCEDURE — 6360000002 HC RX W HCPCS: Performed by: INTERNAL MEDICINE

## 2025-07-23 PROCEDURE — 2720000010 HC SURG SUPPLY STERILE: Performed by: INTERNAL MEDICINE

## 2025-07-23 PROCEDURE — 3609012400 HC EGD TRANSORAL BIOPSY SINGLE/MULTIPLE: Performed by: INTERNAL MEDICINE

## 2025-07-23 RX ORDER — PROPOFOL 10 MG/ML
INJECTION, EMULSION INTRAVENOUS
Status: DISCONTINUED | OUTPATIENT
Start: 2025-07-23 | End: 2025-07-23 | Stop reason: SDUPTHER

## 2025-07-23 RX ORDER — PANTOPRAZOLE SODIUM 40 MG/1
40 TABLET, DELAYED RELEASE ORAL
Status: DISCONTINUED | OUTPATIENT
Start: 2025-07-23 | End: 2025-07-23 | Stop reason: HOSPADM

## 2025-07-23 RX ORDER — SODIUM CHLORIDE 9 MG/ML
INJECTION, SOLUTION INTRAVENOUS PRN
Status: DISCONTINUED | OUTPATIENT
Start: 2025-07-23 | End: 2025-07-23 | Stop reason: HOSPADM

## 2025-07-23 RX ORDER — SODIUM CHLORIDE 0.9 % (FLUSH) 0.9 %
5-40 SYRINGE (ML) INJECTION EVERY 12 HOURS SCHEDULED
Status: DISCONTINUED | OUTPATIENT
Start: 2025-07-23 | End: 2025-07-23 | Stop reason: HOSPADM

## 2025-07-23 RX ORDER — SODIUM CHLORIDE 0.9 % (FLUSH) 0.9 %
5-40 SYRINGE (ML) INJECTION PRN
Status: DISCONTINUED | OUTPATIENT
Start: 2025-07-23 | End: 2025-07-23 | Stop reason: HOSPADM

## 2025-07-23 RX ORDER — LIDOCAINE HYDROCHLORIDE 20 MG/ML
INJECTION, SOLUTION EPIDURAL; INFILTRATION; INTRACAUDAL; PERINEURAL
Status: DISCONTINUED | OUTPATIENT
Start: 2025-07-23 | End: 2025-07-23 | Stop reason: SDUPTHER

## 2025-07-23 RX ORDER — ONDANSETRON 2 MG/ML
4 INJECTION INTRAMUSCULAR; INTRAVENOUS
Status: DISCONTINUED | OUTPATIENT
Start: 2025-07-23 | End: 2025-07-23 | Stop reason: HOSPADM

## 2025-07-23 RX ADMIN — ASPIRIN 81 MG: 81 TABLET, COATED ORAL at 10:45

## 2025-07-23 RX ADMIN — PROPOFOL 140 MCG/KG/MIN: 10 INJECTION, EMULSION INTRAVENOUS at 14:14

## 2025-07-23 RX ADMIN — SULFUR HEXAFLUORIDE 2 ML: 60.7; .19; .19 INJECTION, POWDER, LYOPHILIZED, FOR SUSPENSION INTRAVENOUS; INTRAVESICAL at 10:55

## 2025-07-23 RX ADMIN — SODIUM CHLORIDE: 0.9 INJECTION, SOLUTION INTRAVENOUS at 13:32

## 2025-07-23 RX ADMIN — FINASTERIDE 5 MG: 5 TABLET, FILM COATED ORAL at 10:45

## 2025-07-23 RX ADMIN — PANTOPRAZOLE SODIUM 40 MG: 40 TABLET, DELAYED RELEASE ORAL at 10:45

## 2025-07-23 RX ADMIN — PROPOFOL 50 MG: 10 INJECTION, EMULSION INTRAVENOUS at 14:10

## 2025-07-23 RX ADMIN — LIDOCAINE HYDROCHLORIDE 60 MG: 20 INJECTION, SOLUTION EPIDURAL; INFILTRATION; INTRACAUDAL; PERINEURAL at 14:10

## 2025-07-23 RX ADMIN — SODIUM CHLORIDE: 0.9 INJECTION, SOLUTION INTRAVENOUS at 04:52

## 2025-07-23 RX ADMIN — PROPOFOL 20 MG: 10 INJECTION, EMULSION INTRAVENOUS at 14:12

## 2025-07-23 RX ADMIN — PANTOPRAZOLE SODIUM 40 MG: 40 TABLET, DELAYED RELEASE ORAL at 17:04

## 2025-07-23 RX ADMIN — LISINOPRIL 5 MG: 5 TABLET ORAL at 10:45

## 2025-07-23 RX ADMIN — ENOXAPARIN SODIUM 40 MG: 100 INJECTION SUBCUTANEOUS at 10:45

## 2025-07-23 ASSESSMENT — PAIN SCALES - WONG BAKER
WONGBAKER_NUMERICALRESPONSE: NO HURT

## 2025-07-23 ASSESSMENT — PAIN SCALES - GENERAL
PAINLEVEL_OUTOF10: 0

## 2025-07-23 NOTE — H&P
Gastroenterology Note             Pre-operative History and Physical    Patient: Eh Hwang  : 1945  CSN:     History Obtained From:  patient and/or guardian.     HISTORY OF PRESENT ILLNESS:    The patient is a 79 y.o. male  here for EGD  This very pleasant 79-year-old male who was in the hospital he came in for chest pain we have gotten cardiology clearance for doing a upper endoscopy to evaluate his chest pain and his difficulty swallowing    Past Medical History:    Past Medical History:   Diagnosis Date    Abnormal echocardiogram 2016    Abnormal stress test 2016    CAD (coronary artery disease)     Cardiomyopathy (HCC) 2016    EF 23%    CHF (congestive heart failure) (HCC)     Diabetes (HCC)     Hyperlipidemia     Hypertension     Weight loss      Past Surgical History:    Past Surgical History:   Procedure Laterality Date    COLONOSCOPY  2016    transverse polyp    CORONARY ARTERY BYPASS GRAFT  2016    cabg x 3    CYSTOSCOPY  2018    NECK SURGERY      plate in neck     UPPER GASTROINTESTINAL ENDOSCOPY  2017    esophageal stricture     Medications Prior to Admission:   No current facility-administered medications on file prior to encounter.     Current Outpatient Medications on File Prior to Encounter   Medication Sig Dispense Refill    lisinopril (PRINIVIL;ZESTRIL) 5 MG tablet Take 1 tablet by mouth daily      metoprolol succinate (TOPROL XL) 25 MG extended release tablet Take 1 tablet by mouth nightly 90 tablet 2    pantoprazole (PROTONIX) 40 MG tablet Take 1 tablet by mouth daily 90 tablet 1    digoxin (LANOXIN) 125 MCG tablet Take 1 tablet by mouth daily (Patient taking differently: Take 1 tablet by mouth daily PRN) 90 tablet 3    atorvastatin (LIPITOR) 40 MG tablet Take 1 tablet by mouth nightly 90 tablet 3    finasteride (PROSCAR) 5 MG tablet Take 1 tablet by mouth daily 90 tablet 3    aspirin 81 MG tablet Take 1 tablet by mouth daily      Continuous

## 2025-07-23 NOTE — CARE COORDINATION
Review of chart for any potential discharge needs.   No needs identified for discharge intervention at this time.   MD and bedside RN, if needs arise please consult case management for discharge intervention.  CM will follow as needed      Pt from home with spouse. Pt IPTA. No d/c needs at this time. Pt in observation status.

## 2025-07-23 NOTE — CONSULTS
Gastrointestinal Consult Note    Patient: Eh Hwang CSN: [id number]     YOB: 1945  Age: 79 y.o.  Sex: male    Unit: [address] Room/Bed: 3205/3205-01 Location: [facility]     Admitting Physician: GERARDO THOMAS    Date of  Admission: 7/22/2025   Admission type: Emergency  Primary Care Physician: Blake Abdul MD          Referring Physician: [unfilled]    Chief Complaint: Difficulty swallowing  Consult Date: 7/23/2025     Subjective:     History of Present Illness: Eh Hwang is a 79 y.o. male who is seen at the request of GERARDO THOMAS for difficulty swallowing.    This a very pleasant 79-year-old male who was brought to the hospital because he was having problems with chest pain    The patient does have a history of having ischemic cardiomyopathy  He has a AICD  He has also had problems with the previous esophageal stricture    Patient has had 2 weeks of chest    He has had an a significant weight loss of more than 15 pounds in last  2 months    So we were asked to see and evaluate him for this difficulty swallowing issue    Past Medical History:   Diagnosis Date    Abnormal echocardiogram 09/2016    Abnormal stress test 09/2016    CAD (coronary artery disease)     Cardiomyopathy (HCC) 09/2016    EF 23%    CHF (congestive heart failure) (HCC)     Diabetes (HCC)     Hyperlipidemia     Hypertension     Weight loss      Past Surgical History:   Procedure Laterality Date    COLONOSCOPY  07/16/2016    transverse polyp    CORONARY ARTERY BYPASS GRAFT  09/16/2016    cabg x 3    CYSTOSCOPY  03/2018    NECK SURGERY      plate in neck     UPPER GASTROINTESTINAL ENDOSCOPY  05/22/2017    esophageal stricture      Family History   Problem Relation Age of Onset    Diabetes Father     Heart Disease Father     Cancer Father     High Blood Pressure Father     Diabetes Sister     Diabetes Brother     Cancer Brother         pancreatic     Social History     Tobacco Use

## 2025-07-23 NOTE — PLAN OF CARE
Problem: Chronic Conditions and Co-morbidities  Goal: Patient's chronic conditions and co-morbidity symptoms are monitored and maintained or improved  7/22/2025 2351 by Sarah Rios, RN  Outcome: Progressing  Flowsheets (Taken 7/22/2025 2030)  Care Plan - Patient's Chronic Conditions and Co-Morbidity Symptoms are Monitored and Maintained or Improved:   Monitor and assess patient's chronic conditions and comorbid symptoms for stability, deterioration, or improvement   Collaborate with multidisciplinary team to address chronic and comorbid conditions and prevent exacerbation or deterioration   Update acute care plan with appropriate goals if chronic or comorbid symptoms are exacerbated and prevent overall improvement and discharge  7/22/2025 2351 by Sarah Rios, RN  Outcome: Progressing  Flowsheets (Taken 7/22/2025 2030)  Care Plan - Patient's Chronic Conditions and Co-Morbidity Symptoms are Monitored and Maintained or Improved:   Monitor and assess patient's chronic conditions and comorbid symptoms for stability, deterioration, or improvement   Collaborate with multidisciplinary team to address chronic and comorbid conditions and prevent exacerbation or deterioration   Update acute care plan with appropriate goals if chronic or comorbid symptoms are exacerbated and prevent overall improvement and discharge  7/22/2025 1655 by Maia Astudillo RN  Outcome: Progressing     Problem: Discharge Planning  Goal: Discharge to home or other facility with appropriate resources  7/22/2025 2351 by Sarah Rios RN  Outcome: Progressing  Flowsheets (Taken 7/22/2025 2030)  Discharge to home or other facility with appropriate resources:   Identify barriers to discharge with patient and caregiver   Arrange for needed discharge resources and transportation as appropriate   Identify discharge learning needs (meds, wound care, etc)   Arrange for interpreters to assist at discharge as needed   Refer

## 2025-07-23 NOTE — CONSULTS
Comprehensive Nutrition Assessment    Type and Reason for Visit:  Positive nutrition screen    Nutrition Recommendations/Plan:   Diet: NPO- needs softer diet when diet advances d/t no teeth  ONS: Once diet advances, start Ensure TID (any flavor)  Nutrition support: If within GOC, may need to consider alternative nutrition.  Monitor: Diet advancement, labs, BMs, chewing/swallowing       Malnutrition Assessment:  Malnutrition Status:  Severe malnutrition (07/23/25 1047)    Context:  Chronic Illness     Findings of the 6 clinical characteristics of malnutrition:  Energy Intake:  75% or less estimated energy requirements for 1 month or longer  Weight Loss:  7.5% over 3 months     Body Fat Loss:  Severe body fat loss Orbital, Buccal region   Muscle Mass Loss:  Severe muscle mass loss Hand (interosseous), Clavicles (pectoralis & deltoids), Temples (temporalis)  Fluid Accumulation:  No fluid accumulation     Strength:  Not Performed    Nutrition Assessment:    + nutrition screen. Pt with PMH of CAD s/p CABG and HTN, who presented with chest tightness and 15# wt loss over the past 2-3 months. Has hx of esophageal stricture with dilation. Pt reports appetite has been poor d/t not interested in food and doesn't have any teeth so needs softer foods. Mostly eats mashed potatos, apple sauce, occasionally eggs but does drink many things, including nesquick, tea, and juice. Pt drinks more than he eats. Occasionally does drink supplements at home. Encourage oral intakes and protein intake first and 3 Ensure supplements daily. Pt has lost 9.5% wt loss over the past 3 months and has notable fat and muscle wasting. Pt is currently NPO for EGD today. WIll start Ensure TID when diet advances.    Nutrition Related Findings:    No BM or edema noted; labs reviewed Wound Type: None       Current Nutrition Intake & Therapies:    Average Meal Intake: NPO  Average Supplements Intake: NPO  Diet NPO    Anthropometric Measures:  Height: 165.1

## 2025-07-23 NOTE — PROGRESS NOTES
CARDIOLOGY PROGRESS NOTE        Patient Name: Eh Hwang  Date of admission: 7/22/2025 12:04 PM  Admission Dx: Chest pain [R07.9]  Dyspnea on exertion [R06.09]  Weight loss [R63.4]  Chest pain, unspecified type [R07.9]  Requesting Physician: Kyle Sigala MD  Primary Care physician: Blake Abdul MD    Reason for Consultation/Chief Complaint: chest pain     History of Present Illness:     Eh Hwang is a 79 y.o. patient with Pmh of CAD s/p CABG, ischemic cardiomyopathy, BiV ICD, Htn, Hld, CVA, esophageal strictures. Patient presented to the hospital with complaints of chest tightness on exertion for 2 weeks, family concerned of weight loss (approx 15 lbs / 2 months).  He has not had any cardiac work up recently, therefore, cardiology is consulted for further evaluation.     Patient up to the chair this morning. He states he has not had any chest pains, some pain at his pacemaker site felt irritated. He is overall feeling better, awaiting EGD later today.       Past Medical History:   has a past medical history of Abnormal echocardiogram, Abnormal stress test, CAD (coronary artery disease), Cardiomyopathy (HCC), CHF (congestive heart failure) (HCC), Diabetes (HCC), Hyperlipidemia, Hypertension, and Weight loss.    Surgical History:   has a past surgical history that includes Neck surgery; Colonoscopy (07/16/2016); Coronary artery bypass graft (09/16/2016); Upper gastrointestinal endoscopy (05/22/2017); and Cystoscopy (03/2018).     Social History:   reports that he has never smoked. He has never used smokeless tobacco. He reports that he does not drink alcohol and does not use drugs.     Family History:  family history includes Cancer in his brother and father; Diabetes in his brother, father, and sister; Heart Disease in his father; High Blood Pressure in his father.      Home Medications:  Were reviewed and are listed in nursing record and/or below  Prior to Admission medications  
    Speech Language Pathology  Facility/Department:[facility] PROGRESSIVE CARE  Dysphagia Evaluation/Discharge Summary    Name: Eh Hwang  : 1945  MRN: [id number]                                                     Patient Diagnosis(es):   Patient Active Problem List    Diagnosis Date Noted    Essential hypertension     Coronary artery disease involving native coronary artery of native heart without angina pectoris 2016    Chronic combined systolic and diastolic congestive heart failure (HCC) 2016    Severe malnutrition 2025    Chest pain 2025    History of stroke 2024    TIA (transient ischemic attack) 2022    LBBB (left bundle branch block) 2020    Prostate disease 2019    History of CVA (cerebrovascular accident) without residual deficits 2019    Cardiac resynchronization therapy defibrillator (CRT-D) in place 2017    Dilated cardiomyopathy (HCC) 2017    Esophageal stricture s/p dilatation (2017) 2017    Hyperlipidemia     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 ([facility])     Arterial ischemic stroke, ICA, right, acute (HCC)     CAD s/p CABGx3 ()      Past Medical History:   Diagnosis Date    Abnormal echocardiogram 2016    Abnormal stress test 2016    CAD (coronary artery disease)     Cardiomyopathy (HCC) 2016    EF 23%    CHF (congestive heart failure) (HCC)     Diabetes (HCC)     Hyperlipidemia     Hypertension     Weight loss      Past Surgical History:   Procedure Laterality Date    COLONOSCOPY  2016    transverse polyp    CORONARY ARTERY BYPASS GRAFT  2016    cabg x 3    CYSTOSCOPY  2018    NECK SURGERY      plate in neck     UPPER GASTROINTESTINAL ENDOSCOPY  2017    esophageal stricture     Reason for Referral:  Eh Hwang  was referred for a Speech Therapy evaluation to assess swallow function.  Patient admit from home with family reporting 15 pound 
4 Eyes Skin Assessment     NAME:  Eh Hwang  YOB: 1945  MEDICAL RECORD NUMBER:  [id number]    The patient is being assessed for  Admission    I agree that at least one RN has performed a thorough Head to Toe Skin Assessment on the patient. ALL assessment sites listed below have been assessed.      Areas assessed by both nurses:    Head, Face, Ears, Shoulders, Back, Chest, Arms, Elbows, Hands, Sacrum. Buttock, Coccyx, Ischium, Legs. Feet and Heels, and Under Medical Devices         Does the Patient have a Wound? No noted wound(s); blanchable redness to upper spine and coccyx       Rohan Prevention initiated by RN: Yes  Wound Care Orders initiated by RN: No    For hospital-acquired stage 1 & 2 and ALL Stage 3,4, Unstageable, DTI, NWPT, and Complex wounds: place order “IP Wound Care/Ostomy Nurse Eval and Treat” by RN under : NA    New Ostomies, if present place, Ostomy referral order under : No     Nurse 1 eSignature: Electronically signed by Maia Astudillo RN on 7/22/25 at 4:10 PM EDT    **SHARE this note so that the co-signing nurse can place an eSignature**    Nurse 2 eSignature: Electronically signed by Shelby Valle RN on 7/22/25 at 4:36 PM EDT          
Circulator has verified that procedural consent is correctly filled out prior to the start of the procedure.    
Discharge instructions reviewed with patient. Verbalized understanding. IV dc'd without complications. Tele box returned to nurse's station. Patient taken to ICU via wheelchair to see spouse. ICU nurses aware.     
Pacemaker interrogated. iPad and remote returned to ICU on .   
Patient cleared by Dr. Clarke, cardiology, for EGD.   
Patient is resting in bed. IV infusing. Patient instructed to use call light for any needs that may arise between now and surgery time, verbalized understanding. Bed in lowest position, patient wearing hospital non-slip socks. Denies needs at present with call light in reach.   
Phase 2 Transfer to Floor Note    Procedure(s):  ESOPHAGOGASTRODUODENOSCOPY BIOPSY  ESOPHAGOSCOPY DILATION BALLOON    Current Allergies: Iv dye [iodides], Persantine [dipyridamole], and Coreg [carvedilol]    Pt meets criteria as per Lona Score and ASPAN Standards to transfer to next phase of care.     Recent Labs     07/23/25  0831 07/23/25  1157   POCGLU 99 111*       Vitals:    07/23/25 1450   BP: 120/64   Pulse: 70   Resp: 12   Temp: 98.2 °F (36.8 °C)   SpO2: 100%     Vitals within 20% of pt's admission vitals as per LONA SCORE    SpO2: 100 %           Intake/Output Summary (Last 24 hours) at 7/23/2025 1451  Last data filed at 7/23/2025 1419  Gross per 24 hour   Intake 1538.08 ml   Output --   Net 1538.08 ml       Pain assessment:  none    Pain Level: 0    Patient was assessed for alterations to skin integrity. There were not alterations observed.    Is patient incontinent: no    Handoff report given at bedside to AYAN Carvalho  Family updated by Dr. Donaldson      7/23/2025 2:51 PM  
Physical Therapy   Patient evaluated by OT and found to be indep with functional mobility. No apparent skilled need for physical therapy evaluation at present time. Please re-refer for therapy evaluation should patient experience functional decline and require skilled therapy. Will DC Evaluation attempts by PT at present time.   Ann-Marie Paz, PT      
Pt arrived to preop room # 3 from endo. Report received from Endo RN and CRNA. Pt resting quietly in bed, respirations even and unlabored. Attached to Preop monitoring system. Alarms and parameters set. Will continue to monitor pt closely. Call light in reach.   
unit  Bathroom Equipment: grab bars in shower  Toilet Height: standard height  Home Equipment: no prior equipment  Transfer Assistance: Independent without use of device  Ambulation Assistance:Independent without use of device  ADL Assistance: independent with all ADL's  IADL Assistance: independent with homemaking tasks  Active :        [] Yes  [x] No  Recent Falls: 1 fall from slipping on ice  ___________________________________________________________________________________________________________________________________________________________  Examintaion     Vision:   Vision Corrective Device: wears glasses for reading  Hearing:   hard of hearing  Perception:   WFL  Posture:   midline  Sensation:   WFL and denies numbness and tingling  Proprioception:    WFL  Tone:   Normotonic  Coordination Testing:   WFL    ROM:   (B) UE AROM WFL  Strength:   (B) UE strength grossly WFL      Therapist Clinical Decision Making (Complexity): low complexity  Clinical Presentation: stable  ___________________________________________________________________________________________________________________________________________________________  Cognition:   WFL  Orientation:    alert and oriented x 4  Command Following:   WFL  ___________________________________________________________________________________________________________________________________________________________    General Subjective: Pt agreeable to OT evaluation     Objective:     Pain: 0/10  Pain Interventions: not applicable Patient reports pain is tolerable for therapy       Vitals: denied dizziness throughout  Pt Position BP (mmHg) HR (bpm) SpO2 (%) on RA  RR Comments   Semi fowlers  128/63 (83) 66 100                     Sitting up in recliner at end session 125/70 (85) 63 100       Lines, Drains, & Tubes: IV and Telemetry    Activity Tolerance : Pt tolerated OT evaluation well. No adverse event noted    Activities of Daily Living   Basic

## 2025-07-23 NOTE — ANESTHESIA POSTPROCEDURE EVALUATION
Department of Anesthesiology  Postprocedure Note    Patient: Eh Hwang  MRN: [id number]  YOB: 1945  Date of evaluation: 7/23/2025    Procedure Summary       Date: 07/23/25 Room / Location: [address]    Anesthesia Start: 1406 Anesthesia Stop: 1424    Procedures:       ESOPHAGOGASTRODUODENOSCOPY BIOPSY      ESOPHAGOSCOPY DILATION BALLOON Diagnosis:       Dysphagia, unspecified type      Atypical chest pain      (Dysphagia, unspecified type [R13.10])      (Atypical chest pain [R07.89])    Surgeons: Viral Donaldson MD Responsible Provider: Adrian Howe MD    Anesthesia Type: MAC ASA Status: 3            Anesthesia Type: No value filed.    Andrea Phase I: Andrea Score: 10    Andrea Phase II: Andrea Score: 10    Anesthesia Post Evaluation    Patient location during evaluation: PACU  Patient participation: complete - patient participated  Level of consciousness: awake and alert  Pain score: 2  Airway patency: patent  Nausea & Vomiting: no nausea and no vomiting  Cardiovascular status: hemodynamically stable  Respiratory status: acceptable  Hydration status: stable  Pain management: satisfactory to patient    No notable events documented.

## 2025-07-23 NOTE — PLAN OF CARE
Problem: Chronic Conditions and Co-morbidities  Goal: Patient's chronic conditions and co-morbidity symptoms are monitored and maintained or improved  Outcome: Progressing     Problem: Discharge Planning  Goal: Discharge to home or other facility with appropriate resources  Outcome: Progressing     Problem: Safety - Adult  Goal: Free from fall injury  Outcome: Progressing     Problem: Nutrition Deficit:  Goal: Optimize nutritional status  Outcome: Progressing     Problem: Pain  Goal: Verbalizes/displays adequate comfort level or baseline comfort level  Outcome: Progressing

## 2025-07-23 NOTE — DISCHARGE SUMMARY
Discharge Summary    Name:  Eh Hwang /Age/Sex: 1945 (79 y.o. male)   Admit Date: 2025  Discharge Date: 25    MRN & CSN:  [id number] & [id number] Encounter Date and Time 25 4:53 PM EDT    Attending:  Kyle Sigala MD Discharging Provider: Kyle Sigala MD         Discharge Diagnosis:   # Chest pain, with typical and atypical features.   # Dysphagia, secondary to esophageal stricture status post dilation.   # Weight loss. More than 15 pounds over the last 2 months. Secondary to poor oral intake.  # Severe malnutrition, due to low caloric intake, Body mass index is 18.55 kg/m².  # Physical deconditioning.  # Mild renal insufficiency.      Comorbidities:   # CAD status post CABG  # Ischemic cardiomyopathy, low EF  # Left bundle branch block  # NSVT status post AICD  # Essential hypertension   # History of CVA without residual deficit  # Hyperlipidemia   # Diabetes mellitus type 2  # BPH  # GERD      Hospital Course:   Eh Hwang is a 79 y.o.  male with CAD s/p CABG, ischemic cardiomyopathy s/p AICD, and hypertension, presented with chest tightness and 15-pound weight loss over 2 months. He reported exertional chest pain (2-3/10), dysphagia, poor appetite, and weakness. Initial troponin was negative with paced rhythm on ECG. He was admitted for chest pain workup with plans for cardiology consultation, echocardiogram, GI consultation for dysphagia, and nutritional assessment for weight loss.     Patient underwent an echocardiogram which showed EF 40 to 45% with normal wall thickness and hypokinesis, consistent with patient's known history of ischemic cardiomyopathy. Echo was deemed unremarkable per cardiology. The chest pain appeared to be more of irritation at the pacemaker site. No further ischemic workup was recommended at this time.    Patient was seen by GI and underwent EGD. He was noted to have esophageal stricture for which he received dilation. After the

## 2025-07-23 NOTE — ANESTHESIA PRE PROCEDURE
[facility] Department of Anesthesiology  Pre-Anesthesia Evaluation/Consultation       Name:  Eh Hwang  : 1945  Age:  79 y.o.                                           MRN:  [id number]  Date: 2025           Surgeon: Surgeon(s):  Viral Donaldson MD    Procedure: Procedure(s):  ESOPHAGOGASTRODUODENOSCOPY     Allergies   Allergen Reactions    Iv Dye [Iodides] Anaphylaxis    Persantine [Dipyridamole]      unsure    Coreg [Carvedilol] Nausea And Vomiting     Patient Active Problem List   Diagnosis    Chronic combined systolic and diastolic congestive heart failure (HCC)    Coronary artery disease involving native coronary artery of native heart without angina pectoris    Essential hypertension    CAD s/p CABGx3 ()    Hyperlipidemia    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)    Esophageal stricture s/p dilatation (2017)    Arterial ischemic stroke, ICA, right, acute ([facility])    Dilated cardiomyopathy ([facility])    Cardiac resynchronization therapy defibrillator (CRT-D) in place    Prostate disease    History of CVA (cerebrovascular accident) without residual deficits    LBBB (left bundle branch block)    TIA (transient ischemic attack)    History of stroke    Chest pain    Severe malnutrition     Past Medical History:   Diagnosis Date    Abnormal echocardiogram 2016    Abnormal stress test 2016    CAD (coronary artery disease)     Cardiomyopathy (HCC) 2016    EF 23%    CHF (congestive heart failure) (HCC)     Diabetes ([facility])     Hyperlipidemia     Hypertension     Weight loss      Past Surgical History:   Procedure Laterality Date    COLONOSCOPY  2016    transverse polyp    CORONARY ARTERY BYPASS GRAFT  2016    cabg x 3    CYSTOSCOPY  2018    NECK SURGERY      plate in neck     UPPER GASTROINTESTINAL ENDOSCOPY  2017    esophageal stricture     Social History     Tobacco Use    Smoking status: Never    Smokeless tobacco: Never   Vaping Use

## 2025-07-23 NOTE — PLAN OF CARE
Problem: Occupational Therapy - Adult  Goal: By Discharge: Performs self-care activities at highest level of function for planned discharge setting.  See evaluation for individualized goals.  Outcome: Adequate for Discharge   Sindhu Malcolm OT

## 2025-07-24 ENCOUNTER — CARE COORDINATION (OUTPATIENT)
Dept: CASE MANAGEMENT | Age: 80
End: 2025-07-24

## 2025-07-24 NOTE — CARE COORDINATION
Care Transitions Note    Initial Call - Call within 2 business days of discharge: Yes    Patient Current Location:  Home: [address] [place] [address]    Care Transition Nurse contacted the patient, family, dtr- Sarah by telephone to perform post hospital discharge assessment, verified name and  as identifiers.  Provided introduction to self, and explanation of the Care Transition Nurse role.    Patient: Eh Hwang    Patient : 1945   MRN: [id number]    Reason for Admission: chest pain  Discharge Date: 25  RURS: No data recorded    Last Discharge Facility       Date Complaint Diagnosis Description Type Department Provider    25 Chest Pain Chest pain, unspecified type ... ED to Hosp-Admission (Discharged) (ADMITTED) [facility] 3 PCU Kyle Sigala MD; BR[facility] Coronado.            Was this an external facility discharge? No    Additional needs identified to be addressed with provider   High priority: patient d/c from [facility] 25 dx CP s/p EGD and dilation balloon on 25 w/ Dr Donaldson. Please assist with HFU scheduling within 7 day timeframe. Thank you             Method of communication with provider: chart routing.    Patients top risk factors for readmission: functional physical ability    Interventions to address risk factors:   Education: CP    Care Summary Note: spoke to daughter, Sarah- HIPAA verified in system. Patient was at his home with her child for assistance. Sarah stated patient's wife (her mother) is currently at ICU at [facility] and waiting to be transferred to [facility] for heart surgery. Sarah was at hospital with her mother, but agreed to transition call. Patient is doing well since discharge, but still seeking appropriate foods to eat since his EGD yesterday. Sarah is hopeful that patient will eat more since dilation was completed. Sarah stated her mother assist with patient's medication, but since she is in the hospital, Sarah is helping.

## 2025-07-25 LAB — SURGICAL PATHOLOGY REPORT: NORMAL

## 2025-07-27 ENCOUNTER — HOSPITAL ENCOUNTER (EMERGENCY)
Age: 80
Discharge: HOME | End: 2025-07-28
Attending: EMERGENCY MEDICINE
Payer: MEDICARE

## 2025-07-27 DIAGNOSIS — E83.42 HYPOMAGNESEMIA: ICD-10-CM

## 2025-07-27 DIAGNOSIS — R07.9 CHEST PAIN, UNSPECIFIED TYPE: Primary | ICD-10-CM

## 2025-07-27 PROCEDURE — 93005 ELECTROCARDIOGRAM TRACING: CPT | Performed by: EMERGENCY MEDICINE

## 2025-07-27 PROCEDURE — 99285 EMERGENCY DEPT VISIT HI MDM: CPT

## 2025-07-27 ASSESSMENT — LIFESTYLE VARIABLES
HOW OFTEN DO YOU HAVE A DRINK CONTAINING ALCOHOL: NEVER
HOW MANY STANDARD DRINKS CONTAINING ALCOHOL DO YOU HAVE ON A TYPICAL DAY: PATIENT DOES NOT DRINK

## 2025-07-27 ASSESSMENT — PAIN - FUNCTIONAL ASSESSMENT: PAIN_FUNCTIONAL_ASSESSMENT: NONE - DENIES PAIN

## 2025-07-28 ENCOUNTER — CARE COORDINATION (OUTPATIENT)
Dept: CARE COORDINATION | Age: 80
End: 2025-07-28

## 2025-07-28 ENCOUNTER — APPOINTMENT (OUTPATIENT)
Age: 80
End: 2025-07-28
Payer: MEDICARE

## 2025-07-28 VITALS
OXYGEN SATURATION: 97 % | RESPIRATION RATE: 21 BRPM | DIASTOLIC BLOOD PRESSURE: 68 MMHG | BODY MASS INDEX: 18.99 KG/M2 | HEIGHT: 65 IN | TEMPERATURE: 98 F | HEART RATE: 69 BPM | WEIGHT: 114 LBS | SYSTOLIC BLOOD PRESSURE: 114 MMHG

## 2025-07-28 LAB
ANION GAP SERPL CALCULATED.3IONS-SCNC: 11 MMOL/L (ref 3–16)
BASOPHILS # BLD: 0.04 K/UL (ref 0–0.2)
BASOPHILS NFR BLD: 0 %
BUN SERPL-MCNC: 15 MG/DL (ref 7–20)
CALCIUM SERPL-MCNC: 8.8 MG/DL (ref 8.3–10.6)
CHLORIDE SERPL-SCNC: 105 MMOL/L (ref 99–110)
CO2 SERPL-SCNC: 25 MMOL/L (ref 21–32)
CREAT SERPL-MCNC: 1.3 MG/DL (ref 0.8–1.3)
EKG ATRIAL RATE: 66 BPM
EKG DIAGNOSIS: NORMAL
EKG P AXIS: 93 DEGREES
EKG P-R INTERVAL: 166 MS
EKG Q-T INTERVAL: 454 MS
EKG QRS DURATION: 134 MS
EKG QTC CALCULATION (BAZETT): 475 MS
EKG R AXIS: 133 DEGREES
EKG T AXIS: -12 DEGREES
EKG VENTRICULAR RATE: 66 BPM
EOSINOPHIL # BLD: 0.21 K/UL (ref 0–0.6)
EOSINOPHILS RELATIVE PERCENT: 2 %
ERYTHROCYTE [DISTWIDTH] IN BLOOD BY AUTOMATED COUNT: 13.1 % (ref 12.4–15.4)
GFR, ESTIMATED: 57 ML/MIN/1.73M2
GLUCOSE SERPL-MCNC: 114 MG/DL (ref 70–99)
HCT VFR BLD AUTO: 38.3 % (ref 40.5–52.5)
HGB BLD-MCNC: 12.4 G/DL (ref 13.5–17.5)
IMM GRANULOCYTES # BLD AUTO: 0.02 K/UL (ref 0–0.5)
IMM GRANULOCYTES NFR BLD: 0 %
LYMPHOCYTES NFR BLD: 2.18 K/UL (ref 1–5.1)
LYMPHOCYTES RELATIVE PERCENT: 20 %
MAGNESIUM SERPL-MCNC: 1.6 MG/DL (ref 1.8–2.4)
MCH RBC QN AUTO: 28.1 PG (ref 26–34)
MCHC RBC AUTO-ENTMCNC: 32.4 G/DL (ref 31–36)
MCV RBC AUTO: 86.8 FL (ref 80–100)
MONOCYTES NFR BLD: 0.75 K/UL (ref 0–1.3)
MONOCYTES NFR BLD: 7 %
NEUTROPHILS NFR BLD: 70 %
NEUTS SEG NFR BLD: 7.57 K/UL (ref 1.7–7.7)
PLATELET # BLD AUTO: 233 K/UL (ref 135–450)
PMV BLD AUTO: 10.1 FL
POTASSIUM SERPL-SCNC: 4.9 MMOL/L (ref 3.5–5.1)
RBC # BLD AUTO: 4.41 M/UL (ref 4.2–5.9)
SODIUM SERPL-SCNC: 140 MMOL/L (ref 136–145)
TROPONIN I SERPL HS-MCNC: 18 NG/L (ref 0–22)
TROPONIN I SERPL HS-MCNC: 24 NG/L (ref 0–22)
WBC OTHER # BLD: 10.8 K/UL (ref 4–11)

## 2025-07-28 PROCEDURE — 71045 X-RAY EXAM CHEST 1 VIEW: CPT

## 2025-07-28 PROCEDURE — 83735 ASSAY OF MAGNESIUM: CPT

## 2025-07-28 PROCEDURE — 96365 THER/PROPH/DIAG IV INF INIT: CPT

## 2025-07-28 PROCEDURE — 93010 ELECTROCARDIOGRAM REPORT: CPT | Performed by: STUDENT IN AN ORGANIZED HEALTH CARE EDUCATION/TRAINING PROGRAM

## 2025-07-28 PROCEDURE — 84484 ASSAY OF TROPONIN QUANT: CPT

## 2025-07-28 PROCEDURE — 80048 BASIC METABOLIC PNL TOTAL CA: CPT

## 2025-07-28 PROCEDURE — 6360000002 HC RX W HCPCS: Performed by: EMERGENCY MEDICINE

## 2025-07-28 PROCEDURE — 85025 COMPLETE CBC W/AUTO DIFF WBC: CPT

## 2025-07-28 RX ORDER — MAGNESIUM OXIDE 400 MG/1
400 TABLET ORAL DAILY
Qty: 5 TABLET | Refills: 0 | Status: SHIPPED | OUTPATIENT
Start: 2025-07-28 | End: 2025-08-01

## 2025-07-28 RX ORDER — MAGNESIUM SULFATE IN WATER 40 MG/ML
2000 INJECTION, SOLUTION INTRAVENOUS ONCE
Status: COMPLETED | OUTPATIENT
Start: 2025-07-28 | End: 2025-07-28

## 2025-07-28 RX ADMIN — MAGNESIUM SULFATE HEPTAHYDRATE 2000 MG: 40 INJECTION, SOLUTION INTRAVENOUS at 02:12

## 2025-07-28 NOTE — DISCHARGE INSTRUCTIONS
Return to emergency department if worsening or recurrent chest pain difficulty breathing worse in any way or any problems.    Follow-up with family doctor in 1 week for recheck magnesium   S/w pt. Pt verbalized understanding

## 2025-07-28 NOTE — ED NOTES
D/C: Order noted for d/c. Pt confirmed d/c paperwork has correct name. Discharge and education instructions reviewed with patient. Teach-back successful.  Pt verbalized understanding and denied questions at this time. No acute distress noted. Patient instructed to follow-up as noted - return to emergency department if symptoms worsen. Patient verbalized understanding. Discharged per EDMD with discharge instructions. Pt discharged to private vehicle. Patient stable upon departure. Thanked patient for choosing [facility] for care.

## 2025-07-28 NOTE — ED PROVIDER NOTES
[facility] EMERGENCY DEPARTMENT     EMERGENCY DEPARTMENT ENCOUNTER            Pt Name: Eh Hwang   MRN: [id number]   Birthdate 1945   Date of evaluation: 7/27/2025   Provider: Roger Murray DO   PCP: Blake Abdul MD   Note Started: 8:41 AM EDT 7/28/25          CHIEF COMPLAINT     Chief Complaint   Patient presents with    Palpitations             HISTORY OF PRESENT ILLNESS:   History from : Patient and daughter  Limitations to history : None     Eh Hwang is a 79 y.o. male who presents to emergency department the care of his daughter with complaints of \"vibratory\" chest discomfort onset this evening prior to admission.  Patient states when he lies completely flat or on his left side he can feel a vibratory sensation near his ICD.  Each episode lasts 1 to 2 seconds and then resolves spontaneously.  He reports a soreness in the area of the ICD which has been ongoing and unchanged today.  Patient denies any shocks from his ICD.    Patient denies headache neck pain ear pain sore throat difficulty breathing abdominal pain back pain or pain to the extremities.  Denies focal weakness numbness paresthesias syncope near syncope.  Patient does report occasional sensation of palpitations which also last seconds and resolve spontaneously.    Nursing Notes were all reviewed and agreed with, or any disagreements were addressed in the HPI.     REVIEW OF SYSTEMS :    Positives and Pertinent negatives as per HPI.      MEDICAL HISTORY   has a past medical history of Abnormal echocardiogram (09/2016), Abnormal stress test (09/2016), CAD (coronary artery disease), Cardiomyopathy (HCC) (09/2016), CHF (congestive heart failure) ([facility]), Diabetes ([facility]), Hyperlipidemia, Hypertension, and Weight loss.    Past Surgical History:   Procedure Laterality Date    COLONOSCOPY  07/16/2016    transverse polyp    CORONARY ARTERY BYPASS GRAFT  09/16/2016    cabg x 3    CYSTOSCOPY  03/2018    ESOPHAGOSCOPY N/A

## 2025-07-28 NOTE — CARE COORDINATION
Care Transitions Note    Follow Up Call     Patient Current Location:  Home: [address] [place] [id number]    Care Transition Nurse contacted the family, Sarah by telephone. Verified name and  as identifiers.    Additional needs identified to be addressed with provider   Standard priority: transition ER follow up call completed and noted follow up with PCP office in system. Patient was given magnesium prescription for 5 days then recommended a lab recheck. Please check magnesium level at scheduled visit per AVS instructions. Thank you          Method of communication with provider: chart routing.    Care Summary Note: spoke to daughterSarah- HIPAA noted. Patient is doing \"good\" since ER visit. Discussed visit with Sarah and reviewed AVS. Patient is taking magnesium supplement as prescribed. Routed message to PCP office for FYI message in regards to repeat blood work at [facility] appt. Patient scheduled later this week and noted in system.  Denied any acute needs at present time.  Agreeable to f/u calls.  Educated on the use of urgent care or physician’s 24 hr access line if assistance is needed after hours.      Plan of care updates since last contact:  Education: electrolyte education       Advance Care Planning:   Does patient have an Advance Directive: reviewed during previous call, see note. .    Medication Review:  Full medication review completed during previous call.    Remote Patient Monitoring:  Offered patient enrollment in the Remote Patient Monitoring (RPM) program for in-home monitoring: patient not eligible with affiliate PCP.    Assessments:  Care Transitions Subsequent and Final Call    Subsequent and Final Calls  Are you currently active with any services?: Home Health  Care Transitions Interventions     Other Services: Declined (Comment: home care referral)   Other Interventions:              Follow Up Appointment:   Reviewed upcoming appointment(s).  Future Appointments         Provider

## 2025-07-28 NOTE — ED TRIAGE NOTES
Pt arrives to the ED with c/o palpitations. Pt states that he has been having problems with \"soreness around the pacemaker\" but today he started to feel like \"my heart is throbbing\". Denies SOB, n/v.

## 2025-08-01 ENCOUNTER — OFFICE VISIT (OUTPATIENT)
Dept: INTERNAL MEDICINE CLINIC | Age: 80
End: 2025-08-01

## 2025-08-01 VITALS
HEART RATE: 72 BPM | WEIGHT: 108 LBS | HEIGHT: 65 IN | RESPIRATION RATE: 14 BRPM | DIASTOLIC BLOOD PRESSURE: 62 MMHG | BODY MASS INDEX: 17.99 KG/M2 | SYSTOLIC BLOOD PRESSURE: 110 MMHG

## 2025-08-01 DIAGNOSIS — E43 SEVERE MALNUTRITION: Chronic | ICD-10-CM

## 2025-08-01 DIAGNOSIS — I50.42 CHRONIC COMBINED SYSTOLIC AND DIASTOLIC CONGESTIVE HEART FAILURE (HCC): Primary | ICD-10-CM

## 2025-08-01 DIAGNOSIS — Z09 HOSPITAL DISCHARGE FOLLOW-UP: ICD-10-CM

## 2025-08-01 DIAGNOSIS — I50.42 CHRONIC COMBINED SYSTOLIC AND DIASTOLIC CONGESTIVE HEART FAILURE (HCC): ICD-10-CM

## 2025-08-01 DIAGNOSIS — K22.2 ESOPHAGEAL STRICTURE: ICD-10-CM

## 2025-08-01 PROCEDURE — 99214 OFFICE O/P EST MOD 30 MIN: CPT | Performed by: NURSE PRACTITIONER

## 2025-08-01 PROCEDURE — G8419 CALC BMI OUT NRM PARAM NOF/U: HCPCS | Performed by: NURSE PRACTITIONER

## 2025-08-01 PROCEDURE — 1111F DSCHRG MED/CURRENT MED MERGE: CPT | Performed by: NURSE PRACTITIONER

## 2025-08-01 PROCEDURE — 3074F SYST BP LT 130 MM HG: CPT | Performed by: NURSE PRACTITIONER

## 2025-08-01 PROCEDURE — 1123F ACP DISCUSS/DSCN MKR DOCD: CPT | Performed by: NURSE PRACTITIONER

## 2025-08-01 PROCEDURE — 1036F TOBACCO NON-USER: CPT | Performed by: NURSE PRACTITIONER

## 2025-08-01 PROCEDURE — 3078F DIAST BP <80 MM HG: CPT | Performed by: NURSE PRACTITIONER

## 2025-08-01 PROCEDURE — G8427 DOCREV CUR MEDS BY ELIG CLIN: HCPCS | Performed by: NURSE PRACTITIONER

## 2025-08-02 LAB
ALBUMIN SERPL-MCNC: 4.4 G/DL (ref 3.4–5)
ALBUMIN/GLOB SERPL: 1.6 {RATIO} (ref 1.1–2.2)
ALP SERPL-CCNC: 73 U/L (ref 40–129)
ALT SERPL-CCNC: 7 U/L (ref 10–40)
ANION GAP SERPL CALCULATED.3IONS-SCNC: 11 MMOL/L (ref 3–16)
AST SERPL-CCNC: 16 U/L (ref 15–37)
BILIRUB SERPL-MCNC: 0.6 MG/DL (ref 0–1)
BUN SERPL-MCNC: 13 MG/DL (ref 7–20)
CALCIUM SERPL-MCNC: 9.9 MG/DL (ref 8.3–10.6)
CHLORIDE SERPL-SCNC: 101 MMOL/L (ref 99–110)
CO2 SERPL-SCNC: 25 MMOL/L (ref 21–32)
CREAT SERPL-MCNC: 1.3 MG/DL (ref 0.8–1.3)
GFR SERPLBLD CREATININE-BSD FMLA CKD-EPI: 55 ML/MIN/{1.73_M2}
GLUCOSE SERPL-MCNC: 144 MG/DL (ref 70–99)
MAGNESIUM SERPL-MCNC: 1.86 MG/DL (ref 1.8–2.4)
POTASSIUM SERPL-SCNC: 5 MMOL/L (ref 3.5–5.1)
PROT SERPL-MCNC: 7.2 G/DL (ref 6.4–8.2)
SODIUM SERPL-SCNC: 137 MMOL/L (ref 136–145)

## 2025-08-04 ENCOUNTER — CARE COORDINATION (OUTPATIENT)
Dept: CARE COORDINATION | Age: 80
End: 2025-08-04

## 2025-08-11 ENCOUNTER — CARE COORDINATION (OUTPATIENT)
Dept: CARE COORDINATION | Age: 80
End: 2025-08-11

## 2025-08-15 ENCOUNTER — CARE COORDINATION (OUTPATIENT)
Dept: CARE COORDINATION | Age: 80
End: 2025-08-15

## 2025-09-04 ENCOUNTER — CLINICAL SUPPORT (OUTPATIENT)
Dept: CARDIOLOGY CLINIC | Age: 80
End: 2025-09-04

## 2025-09-04 ENCOUNTER — OFFICE VISIT (OUTPATIENT)
Dept: CARDIOLOGY CLINIC | Age: 80
End: 2025-09-04
Payer: MEDICARE

## 2025-09-04 VITALS
HEIGHT: 65 IN | BODY MASS INDEX: 18.81 KG/M2 | WEIGHT: 112.88 LBS | HEART RATE: 69 BPM | SYSTOLIC BLOOD PRESSURE: 124 MMHG | OXYGEN SATURATION: 99 % | DIASTOLIC BLOOD PRESSURE: 58 MMHG

## 2025-09-04 DIAGNOSIS — I42.0 DILATED CARDIOMYOPATHY (HCC): ICD-10-CM

## 2025-09-04 DIAGNOSIS — I42.0 DILATED CARDIOMYOPATHY (HCC): Primary | ICD-10-CM

## 2025-09-04 DIAGNOSIS — Z95.810 CARDIAC RESYNCHRONIZATION THERAPY DEFIBRILLATOR (CRT-D) IN PLACE: ICD-10-CM

## 2025-09-04 DIAGNOSIS — I44.7 LBBB (LEFT BUNDLE BRANCH BLOCK): ICD-10-CM

## 2025-09-04 DIAGNOSIS — Z95.810 CARDIAC RESYNCHRONIZATION THERAPY DEFIBRILLATOR (CRT-D) IN PLACE: Primary | ICD-10-CM

## 2025-09-04 PROCEDURE — 1123F ACP DISCUSS/DSCN MKR DOCD: CPT | Performed by: NURSE PRACTITIONER

## 2025-09-04 PROCEDURE — 3074F SYST BP LT 130 MM HG: CPT | Performed by: NURSE PRACTITIONER

## 2025-09-04 PROCEDURE — 1159F MED LIST DOCD IN RCRD: CPT | Performed by: NURSE PRACTITIONER

## 2025-09-04 PROCEDURE — 99214 OFFICE O/P EST MOD 30 MIN: CPT | Performed by: NURSE PRACTITIONER

## 2025-09-04 PROCEDURE — 1036F TOBACCO NON-USER: CPT | Performed by: NURSE PRACTITIONER

## 2025-09-04 PROCEDURE — G8427 DOCREV CUR MEDS BY ELIG CLIN: HCPCS | Performed by: NURSE PRACTITIONER

## 2025-09-04 PROCEDURE — 3078F DIAST BP <80 MM HG: CPT | Performed by: NURSE PRACTITIONER

## 2025-09-04 PROCEDURE — G8420 CALC BMI NORM PARAMETERS: HCPCS | Performed by: NURSE PRACTITIONER

## 2025-09-04 PROCEDURE — 1160F RVW MEDS BY RX/DR IN RCRD: CPT | Performed by: NURSE PRACTITIONER

## 2025-09-04 PROCEDURE — 93000 ELECTROCARDIOGRAM COMPLETE: CPT | Performed by: NURSE PRACTITIONER

## 2025-09-04 PROCEDURE — G2211 COMPLEX E/M VISIT ADD ON: HCPCS | Performed by: NURSE PRACTITIONER

## 2025-09-05 DIAGNOSIS — E11.51 DIABETES MELLITUS TYPE 2 WITH ATHEROSCLEROSIS OF ARTERIES OF EXTREMITIES (HCC): ICD-10-CM

## 2025-09-05 DIAGNOSIS — I70.209 DIABETES MELLITUS TYPE 2 WITH ATHEROSCLEROSIS OF ARTERIES OF EXTREMITIES (HCC): ICD-10-CM

## 2025-09-05 RX ORDER — ACYCLOVIR 400 MG/1
TABLET ORAL
Qty: 9 EACH | Refills: 0 | Status: SHIPPED | OUTPATIENT
Start: 2025-09-05

## (undated) DEVICE — BULB SYRINGE & TIP PROTECTOR: Brand: LSL HEALTHCARE

## (undated) DEVICE — CONMED SCOPE SAVER BITE BLOCK, 20X27 MM: Brand: SCOPE SAVER

## (undated) DEVICE — AIR/WATER CLEANING ADAPTER FOR OLYMPUS® GI ENDOSCOPE: Brand: BULLDOG®

## (undated) DEVICE — ESOPHAGEAL BALLOON DILATATION CATHETER: Brand: CRE FIXED WIRE

## (undated) DEVICE — SYRINGE INFL 60ML DISP ALLIANCE II

## (undated) DEVICE — TUBING, SUCTION, 1/4" X 12', STRAIGHT: Brand: MEDLINE

## (undated) DEVICE — SINGLE USE AIR/WATER, SUCTION AND BIOPSY VALVES SET: Brand: ORCAPOD™

## (undated) DEVICE — ENDOSCOPIC KIT 1.1+ 6 FT 2 GWN AAMI LEVEL 3

## (undated) DEVICE — FORCEPS BX L240CM WRK CHN 2.8MM STD CAP W/ NDL MIC MESH

## (undated) DEVICE — BRUSH CLN L220CM DIA5MM ZAH DISP FOR WRK CHAN DIA2.8/4.2MM

## (undated) DEVICE — SOLUTION IRRIG 1000ML STRL H2O USP PLAS POUR BTL